# Patient Record
Sex: MALE | Race: WHITE | NOT HISPANIC OR LATINO | Employment: PART TIME | URBAN - METROPOLITAN AREA
[De-identification: names, ages, dates, MRNs, and addresses within clinical notes are randomized per-mention and may not be internally consistent; named-entity substitution may affect disease eponyms.]

---

## 2017-01-10 ENCOUNTER — CLINICAL SUPPORT (OUTPATIENT)
Dept: FAMILY MEDICINE | Facility: CLINIC | Age: 67
End: 2017-01-10
Payer: COMMERCIAL

## 2017-01-10 DIAGNOSIS — E29.1 HYPOGONADISM MALE: Primary | ICD-10-CM

## 2017-01-10 PROCEDURE — 96372 THER/PROPH/DIAG INJ SC/IM: CPT | Mod: S$GLB,,, | Performed by: FAMILY MEDICINE

## 2017-01-10 RX ADMIN — TESTOSTERONE CYPIONATE 200 MG: 200 INJECTION, SOLUTION INTRAMUSCULAR at 09:01

## 2017-04-26 ENCOUNTER — PATIENT MESSAGE (OUTPATIENT)
Dept: FAMILY MEDICINE | Facility: CLINIC | Age: 67
End: 2017-04-26

## 2017-05-10 ENCOUNTER — OFFICE VISIT (OUTPATIENT)
Dept: FAMILY MEDICINE | Facility: CLINIC | Age: 67
End: 2017-05-10
Payer: COMMERCIAL

## 2017-05-10 VITALS
DIASTOLIC BLOOD PRESSURE: 70 MMHG | BODY MASS INDEX: 46.13 KG/M2 | HEIGHT: 68 IN | HEART RATE: 68 BPM | SYSTOLIC BLOOD PRESSURE: 128 MMHG | WEIGHT: 304.38 LBS | TEMPERATURE: 98 F

## 2017-05-10 DIAGNOSIS — I10 ESSENTIAL HYPERTENSION: ICD-10-CM

## 2017-05-10 DIAGNOSIS — M51.36 DDD (DEGENERATIVE DISC DISEASE), LUMBAR: Primary | ICD-10-CM

## 2017-05-10 DIAGNOSIS — E78.5 HYPERLIPIDEMIA, UNSPECIFIED HYPERLIPIDEMIA TYPE: ICD-10-CM

## 2017-05-10 DIAGNOSIS — M25.562 CHRONIC PAIN OF BOTH KNEES: ICD-10-CM

## 2017-05-10 DIAGNOSIS — M25.561 CHRONIC PAIN OF BOTH KNEES: ICD-10-CM

## 2017-05-10 DIAGNOSIS — G89.29 CHRONIC PAIN OF BOTH KNEES: ICD-10-CM

## 2017-05-10 PROCEDURE — 3074F SYST BP LT 130 MM HG: CPT | Mod: S$GLB,,, | Performed by: FAMILY MEDICINE

## 2017-05-10 PROCEDURE — 99213 OFFICE O/P EST LOW 20 MIN: CPT | Mod: S$GLB,,, | Performed by: FAMILY MEDICINE

## 2017-05-10 PROCEDURE — 99999 PR PBB SHADOW E&M-EST. PATIENT-LVL IV: CPT | Mod: PBBFAC,,, | Performed by: FAMILY MEDICINE

## 2017-05-10 PROCEDURE — 1160F RVW MEDS BY RX/DR IN RCRD: CPT | Mod: S$GLB,,, | Performed by: FAMILY MEDICINE

## 2017-05-10 PROCEDURE — 3078F DIAST BP <80 MM HG: CPT | Mod: S$GLB,,, | Performed by: FAMILY MEDICINE

## 2017-05-10 RX ORDER — MELOXICAM 15 MG/1
15 TABLET ORAL DAILY
Qty: 30 TABLET | Refills: 0 | Status: SHIPPED | OUTPATIENT
Start: 2017-05-10 | End: 2017-05-10 | Stop reason: SDUPTHER

## 2017-05-10 RX ORDER — ATORVASTATIN CALCIUM 40 MG/1
TABLET, FILM COATED ORAL
Qty: 90 TABLET | Refills: 3 | Status: SHIPPED | OUTPATIENT
Start: 2017-05-10 | End: 2017-07-31 | Stop reason: SDUPTHER

## 2017-05-10 RX ORDER — MELOXICAM 15 MG/1
15 TABLET ORAL DAILY
Qty: 30 TABLET | Refills: 0 | Status: SHIPPED | OUTPATIENT
Start: 2017-05-10 | End: 2017-10-30 | Stop reason: SDUPTHER

## 2017-05-10 RX ORDER — VALSARTAN 160 MG/1
TABLET ORAL
Qty: 90 TABLET | Refills: 3 | Status: SHIPPED | OUTPATIENT
Start: 2017-05-10 | End: 2017-07-31 | Stop reason: SDUPTHER

## 2017-05-10 NOTE — MR AVS SNAPSHOT
[facility]  [address] [facility] [address]  Phone: [phone]  Fax: [phone]                  Marvin Chacon Jr.   5/10/2017 1:20 PM   Office Visit    Description:  Male : 1950   Provider:  Stef Brady MD   Department:  [facility]           Reason for Visit     Knee Pain     Back Pain     Shortness of Breath           Diagnoses this Visit        Comments    DDD (degenerative disc disease), lumbar    -  Primary     Chronic pain of both knees         Essential hypertension         Hyperlipidemia, unspecified hyperlipidemia type                To Do List           Goals (5 Years of Data)     None       These Medications        Disp Refills Start End    meloxicam (MOBIC) 15 MG tablet 30 tablet 0 5/10/2017 5/10/2018    Take 1 tablet (15 mg total) by mouth once daily. - Oral    [facility] - [place], [place] - [address] AT [address] to [facility] Ph #: [phone]         [facility] On Call     [facility] On [facility] Nurse Care Line -  Assistance  Unless otherwise directed by your provider, please contact [facility], our nurse care line that is available for  assistance.     Registered nurses in the [facility] On Call Center provide: appointment scheduling, clinical advisement, health education, and other advisory services.  Call: [phone] (toll free)               Medications           Message regarding Medications     Verify the changes and/or additions to your medication regime listed below are the same as discussed with your clinician today.  If any of these changes or additions are incorrect, please notify your healthcare provider.        START taking these NEW medications        Refills    meloxicam (MOBIC) 15 MG tablet 0    Sig: Take 1 tablet (15 mg total) by mouth once daily.    Class: Normal    Route: Oral      STOP taking these medications     diclofenac (VOLTAREN) 75 MG EC tablet  "Take 1 tablet (75 mg total) by mouth 2 (two) times daily.           Verify that the below list of medications is an accurate representation of the medications you are currently taking.  If none reported, the list may be blank. If incorrect, please contact your healthcare provider. Carry this list with you in case of emergency.           Current Medications     ASCORBIC ACID (FRUIT C-500 ORAL) Take 1 tablet by mouth 2 (two) times daily.    aspirin 325 MG tablet Take 325 mg by mouth once daily.    atorvastatin (LIPITOR) 40 MG tablet TAKE 1 TABLET ONCE DAILY    clotrimazole (LOTRIMIN) 1 % cream Apply topically 2 (two) times daily. Use bid prn.    fluticasone (FLONASE) 50 mcg/actuation nasal spray 2 sprays by Each Nare route once daily.    multivitamin-minerals-lutein (CENTRUM SILVER) Tab Take 1 tablet by mouth once daily. Every day    omega-3 acid ethyl esters (LOVAZA) 1 gram capsule Take 2 capsules (2 g total) by mouth 2 (two) times daily.    PSYLLIUM SEED, WITH DEXTROSE, (NATURAL VEGETABLE ORAL) Take 1 tablet by mouth 2 (two) times daily.    sodium chloride (OCEAN NASAL MIST) 0.65 % nasal spray Four times a day    valsartan (DIOVAN) 160 MG tablet TAKE 1 TABLET ONCE DAILY    meloxicam (MOBIC) 15 MG tablet Take 1 tablet (15 mg total) by mouth once daily.           Clinical Reference Information           Your Vitals Were     BP Pulse Temp Height Weight BMI    128/70 (BP Location: Left arm, Patient Position: Sitting, BP Method: Automatic) 68 98.3 °F (36.8 °C) (Oral) 5' 8" (1.727 m) 138 kg (304 lb 5.5 oz) 46.28 kg/m2      Blood Pressure          Most Recent Value    BP  128/70      Allergies as of 5/10/2017     No Known Drug Allergies      Immunizations Administered on Date of Encounter - 5/10/2017     None      Orders Placed During Today's Visit      Normal Orders This Visit    Ambulatory Referral to Physical/Occupational Therapy     Future Labs/Procedures Expected by Expires    Comprehensive metabolic panel  5/10/2017 " (Approximate) 5/10/2018    Lipid panel  5/10/2017 (Approximate) 5/10/2018      Instructions               Language Assistance Services     ATTENTION: Language assistance services are available, free of charge. Please call [phone].      ATENCIÓN: Si rose mckinley, tiene a guillen disposición servicios gratuitos de asistencia lingüística. Llame al [phone].     CHÚ Ý: N?u b?n nói Ti?ng Vi?t, có các d?ch v? h? tr? ngôn ng? mi?n phí dành cho b?n. G?i s? [phone].         [facility] complies with applicable Federal civil rights laws and does not discriminate on the basis of race, color, national origin, age, disability, or sex.

## 2017-05-10 NOTE — PROGRESS NOTES
Subjective:      Patient ID: Marvin Chacon Jr. is a 66 y.o. male.    Chief Complaint: Knee Pain; Back Pain; and Shortness of Breath    HPI  He states that when he gets up in the AM, he has a little dizziness and he feels that he sits up for 10-30 seconds and he can get up and move.     His main problem is that eh has knee pain that is bilaterally and the left knee is most painful on the lateral side just lateral to the knee cap.   His right L5 has been having a problem for 15 years. He states that he went to a back doc and he had an MRI. He had PT and he did beter for this for a long time.  If he leans to the left it is bad but if he leans over to the right to tie his shoe, he almost can't get up  He has radiation to the right leg when he does this. Sitting on the toilet will give him numbness in the right leg.  No bladder or bowel incontinence noted.  He is going onto Medicare on June 30.   He has radiating pain down the right leg.     Narrative      Routine multiplanar imaging through this 62-year-old males lumbar spine was obtained with the addition of a stir weighted sequence.    The vertebral bodies appear relatively well aligned.  Vertebral body heights appear well preserved.  Mild decreased T2 signal is noted at the L3-L4 L4-L5 and L5-S1 disks consistent with degenerative disk desiccation.  The spinal cord appears to terminate   at the L1 level.  No stir weighted signal abnormality was noted to suggest the presence of an aggressive the marrow placement process or recent fracture.      Osseous spurring is noted at each SI joint with partial ankylosis suspected on the right greater than left.  This is incompletely visualized but may relate to the history of degenerative change or sacroiliitis.  Please clinically correlate.    Anterior osseous spurring is noted at multiple levels including the T11-T12 T12 L1, L1 L2, L3 L4, L4-L5 and L5-S1 levels.    At the T12-L1 level no significant disk bulge, central canal  stenosis, or nerve foraminal stenosis is noted.    At the L1-L2 level, mild facet arthropathy and ligamentous hypertrophy is noted.  Minimal disk bulge of approximately 1 mm is noted with no significant central canal stenosis or neural foraminal stenosis  noted.    At the L2-L3 level, facet arthropathy and ligamentous hypertrophy is noted.  Minimal broad-based bulging is noted asymmetrically towards each nerve foramen of approximately 1 to 2 mm.  No significant central canal stenosis is noted.  Minimal   neuroforamina narrowing is noted with the exiting nerve roots appearing to exit unimpinged.    At the L3-L4 level, mild broad-based bulging is noted towards the left neural foramen with an asymmetric protrusive component towards the right nerve foramen and far lateral right that is broad based of approximately 4 mm.  Mild left neuroforaminal   stenosis is noted with moderate right nerve foraminal stenosis.  There is suspected to be contact of the exiting nerve root on the right.  No significant central canal stenosis is noted.    At the L4-L5 level facet arthropathy and ligamentous hypertrophy are noted.  No significant central canal stenosis is noted.  Asymmetric broad-based bulging is noted far lateral to the right and towards the right nerve foramen with moderate to severe   right-sided neural foraminal stenosis and suspected contact of the exiting nerve root.  Mild left-sided neural foraminal stenosis is noted with the exiting nerve root favored to exit unimpinged.  Facet arthropathy and ligamentous hypertrophy are seen.    At the L5-S1 level, a broad based central disk bulge appears to be present with a asymmetric protrusive component towards the paracentric left of approximately 4 to 5 mm.  Mild bilateral neuroforaminal stenosis noted.  Mild central canal narrowing is   noted.  Left lateral recess stenosis is noted with a nerve root coming near the disk material but without definitive contact.       Impression  "         1.  The protrusive changes are noted at the L3-L4 L4-L5 and L5-S1 levels as described above most notable for neuroforaminal stenosis at the L3-L4 and L4-L5 levels on the right.  The protrusion at the L5-S1 level is paracentric to the left and causing   left lateral recess stenosis.    2.  SI joint spurring and ankylosis appears to be present.  This may be degenerative relate to the history of sacroiliitis.        Electronically signed by: Marcos Clements MD  Date: 12/28/12  Time: 11:22         Encounter      View Encounter            He has started walking and he states that this has been OK for him.      The patient presents with essential hypertension.  The patient is tolerating the medication well and is in excellent compliance.  The patient is experiencing no side effects.  Counseling was offered regarding low salt diets.  The patient has a reduced salt intake.  The patient denies chest pain, palpitations, shortness of breath, dyspnea on exertion, left or murmur neck pain, nausea, vomiting, diaphoresis, paroxysmal nocturnal dyspnea, and orthopnea.   /70 (BP Location: Left arm, Patient Position: Sitting, BP Method: Automatic)  Pulse 68  Temp 98.3 °F (36.8 °C) (Oral)   Ht 5' 8" (1.727 m)  Wt (!) 138 kg (304 lb 5.5 oz)  BMI 46.28 kg/m2  The patient presents with hyperlipidemia.  The patient reports tolerating the medication well and is in excellent compliance.  There have been no medication side effects.  The patient denies chest pain, neuropathy, and myalgias.  The patient has reduced fat intake and has been exercising.  Current treatment has included the medications listed in the med card.    Lab Results   Component Value Date    CHOL 109 (L) 12/06/2016    CHOL 122 02/23/2015    CHOL 91 (L) 05/26/2014       Lab Results   Component Value Date    HDL 35 (L) 12/06/2016    HDL 40 02/23/2015    HDL 38 (L) 05/26/2014       Lab Results   Component Value Date    LDLCALC 53.0 (L) 12/06/2016    LDLCALC " "57.6 (L) 02/23/2015    LDLCALC 35.2 (L) 05/26/2014       Lab Results   Component Value Date    TRIG 105 12/06/2016    TRIG 122 02/23/2015    TRIG 89 05/26/2014       Lab Results   Component Value Date    CHOLHDL 32.1 12/06/2016    CHOLHDL 32.8 02/23/2015    CHOLHDL 41.8 05/26/2014     Lab Results   Component Value Date    ALT 22 12/06/2016    AST 20 12/06/2016    ALKPHOS 79 12/06/2016    BILITOT 0.5 12/06/2016       Review of Systems    Objective:   /70 (BP Location: Left arm, Patient Position: Sitting, BP Method: Automatic)  Pulse 68  Temp 98.3 °F (36.8 °C) (Oral)   Ht 5' 8" (1.727 m)  Wt (!) 138 kg (304 lb 5.5 oz)  BMI 46.28 kg/m2    Physical Exam   Musculoskeletal:        Right knee: He exhibits normal range of motion, no swelling, no effusion, no ecchymosis, no deformity, no laceration and no erythema. Tenderness found.        Left knee: He exhibits normal range of motion, no swelling, no effusion, no ecchymosis, no deformity, no laceration and no erythema. Tenderness found.        Lumbar back: He exhibits tenderness, pain and spasm. He exhibits normal range of motion, no swelling, no edema and no deformity.   Neurological: He is alert. He has normal strength. He displays no atrophy and no tremor. No sensory deficit. He exhibits normal muscle tone. Coordination and gait normal.   The patient has a negative straight leg raise bilaterally.         Assessment:     1. DDD (degenerative disc disease), lumbar    2. Chronic pain of both knees    3. Essential hypertension    4. Hyperlipidemia, unspecified hyperlipidemia type        Plan:   Marvin was seen today for knee pain, back pain and shortness of breath.    Diagnoses and all orders for this visit:    DDD (degenerative disc disease), lumbar  -     meloxicam (MOBIC) 15 MG tablet; Take 1 tablet (15 mg total) by mouth once daily.  -     Ambulatory Referral to Physical/Occupational Therapy    Chronic pain of both knees  -     meloxicam (MOBIC) 15 MG tablet; " Take 1 tablet (15 mg total) by mouth once daily.  -     Ambulatory Referral to Physical/Occupational Therapy    Essential hypertension  -     Comprehensive metabolic panel; Future    Hyperlipidemia, unspecified hyperlipidemia type  -     Comprehensive metabolic panel; Future  -     Lipid panel; Future      rtc if not better with the PT and consider an MRI in the future.

## 2017-06-11 ENCOUNTER — PATIENT MESSAGE (OUTPATIENT)
Dept: FAMILY MEDICINE | Facility: CLINIC | Age: 67
End: 2017-06-11

## 2017-06-11 DIAGNOSIS — G89.29 CHRONIC PAIN OF BOTH KNEES: ICD-10-CM

## 2017-06-11 DIAGNOSIS — M51.36 DDD (DEGENERATIVE DISC DISEASE), LUMBAR: ICD-10-CM

## 2017-06-11 DIAGNOSIS — M25.562 CHRONIC PAIN OF BOTH KNEES: ICD-10-CM

## 2017-06-11 DIAGNOSIS — M25.561 CHRONIC PAIN OF BOTH KNEES: ICD-10-CM

## 2017-07-29 ENCOUNTER — PATIENT MESSAGE (OUTPATIENT)
Dept: FAMILY MEDICINE | Facility: CLINIC | Age: 67
End: 2017-07-29

## 2017-07-29 DIAGNOSIS — I10 ESSENTIAL HYPERTENSION: ICD-10-CM

## 2017-07-31 DIAGNOSIS — I10 ESSENTIAL HYPERTENSION: ICD-10-CM

## 2017-07-31 RX ORDER — VALSARTAN 160 MG/1
160 TABLET ORAL DAILY
Qty: 90 TABLET | Refills: 3 | Status: SHIPPED | OUTPATIENT
Start: 2017-07-31 | End: 2017-08-02 | Stop reason: SDUPTHER

## 2017-07-31 RX ORDER — OMEGA-3-ACID ETHYL ESTERS 1 G/1
2 CAPSULE, LIQUID FILLED ORAL 2 TIMES DAILY
Qty: 360 CAPSULE | Refills: 3 | Status: SHIPPED | OUTPATIENT
Start: 2017-07-31 | End: 2020-01-28

## 2017-07-31 RX ORDER — ATORVASTATIN CALCIUM 40 MG/1
40 TABLET, FILM COATED ORAL DAILY
Qty: 90 TABLET | Refills: 3 | Status: SHIPPED | OUTPATIENT
Start: 2017-07-31 | End: 2017-08-02 | Stop reason: SDUPTHER

## 2017-07-31 NOTE — TELEPHONE ENCOUNTER
----- Message from Flor Michaud sent at 7/31/2017  3:06 PM CDT -----  Pt at [phone]//States he is returning your call//please call again//thanks/evangelista

## 2017-08-02 ENCOUNTER — LAB VISIT (OUTPATIENT)
Dept: LAB | Facility: HOSPITAL | Age: 67
End: 2017-08-02
Attending: INTERNAL MEDICINE
Payer: MEDICARE

## 2017-08-02 DIAGNOSIS — E78.00 PURE HYPERCHOLESTEROLEMIA: ICD-10-CM

## 2017-08-02 DIAGNOSIS — I10 ESSENTIAL HYPERTENSION, MALIGNANT: Primary | ICD-10-CM

## 2017-08-02 LAB
ANION GAP SERPL CALC-SCNC: 11 MMOL/L
BASOPHILS # BLD AUTO: 0.03 K/UL
BASOPHILS NFR BLD: 0.3 %
BUN SERPL-MCNC: 10 MG/DL
CALCIUM SERPL-MCNC: 9.7 MG/DL
CHLORIDE SERPL-SCNC: 100 MMOL/L
CHOLEST/HDLC SERPL: 3 {RATIO}
CO2 SERPL-SCNC: 30 MMOL/L
CREAT SERPL-MCNC: 1 MG/DL
DIFFERENTIAL METHOD: ABNORMAL
EOSINOPHIL # BLD AUTO: 0.1 K/UL
EOSINOPHIL NFR BLD: 1.3 %
ERYTHROCYTE [DISTWIDTH] IN BLOOD BY AUTOMATED COUNT: 14.4 %
EST. GFR  (AFRICAN AMERICAN): >60 ML/MIN/1.73 M^2
EST. GFR  (NON AFRICAN AMERICAN): >60 ML/MIN/1.73 M^2
GLUCOSE SERPL-MCNC: 103 MG/DL
HCT VFR BLD AUTO: 47.6 %
HDL/CHOLESTEROL RATIO: 33.6 %
HDLC SERPL-MCNC: 128 MG/DL
HDLC SERPL-MCNC: 43 MG/DL
HGB BLD-MCNC: 15.6 G/DL
LDLC SERPL CALC-MCNC: 20 MG/DL
LYMPHOCYTES # BLD AUTO: 2.1 K/UL
LYMPHOCYTES NFR BLD: 18.3 %
MCH RBC QN AUTO: 29.2 PG
MCHC RBC AUTO-ENTMCNC: 32.8 G/DL
MCV RBC AUTO: 89 FL
MONOCYTES # BLD AUTO: 0.6 K/UL
MONOCYTES NFR BLD: 5.6 %
NEUTROPHILS # BLD AUTO: 8.3 K/UL
NEUTROPHILS NFR BLD: 74.1 %
NONHDLC SERPL-MCNC: 85 MG/DL
PLATELET # BLD AUTO: 233 K/UL
PMV BLD AUTO: 9.9 FL
POTASSIUM SERPL-SCNC: 4.4 MMOL/L
RBC # BLD AUTO: 5.35 M/UL
SODIUM SERPL-SCNC: 141 MMOL/L
TRIGL SERPL-MCNC: 325 MG/DL
WBC # BLD AUTO: 11.18 K/UL

## 2017-08-02 PROCEDURE — 36415 COLL VENOUS BLD VENIPUNCTURE: CPT | Mod: PO

## 2017-08-02 PROCEDURE — 80061 LIPID PANEL: CPT

## 2017-08-02 PROCEDURE — 80076 HEPATIC FUNCTION PANEL: CPT

## 2017-08-02 PROCEDURE — 85025 COMPLETE CBC W/AUTO DIFF WBC: CPT

## 2017-08-02 PROCEDURE — 80048 BASIC METABOLIC PNL TOTAL CA: CPT

## 2017-08-02 RX ORDER — ATORVASTATIN CALCIUM 40 MG/1
40 TABLET, FILM COATED ORAL DAILY
Qty: 90 TABLET | Refills: 3 | Status: SHIPPED | OUTPATIENT
Start: 2017-08-02 | End: 2017-08-04 | Stop reason: SDUPTHER

## 2017-08-02 RX ORDER — VALSARTAN 160 MG/1
160 TABLET ORAL DAILY
Qty: 90 TABLET | Refills: 3 | Status: SHIPPED | OUTPATIENT
Start: 2017-08-02 | End: 2017-08-04 | Stop reason: SDUPTHER

## 2017-08-03 LAB
ALBUMIN SERPL BCP-MCNC: 3.5 G/DL
ALP SERPL-CCNC: 81 U/L
ALT SERPL W/O P-5'-P-CCNC: 24 U/L
AST SERPL-CCNC: 21 U/L
BILIRUB DIRECT SERPL-MCNC: 0.1 MG/DL
BILIRUB SERPL-MCNC: 0.3 MG/DL
PROT SERPL-MCNC: 7.7 G/DL

## 2017-08-04 DIAGNOSIS — I10 ESSENTIAL HYPERTENSION: ICD-10-CM

## 2017-08-04 RX ORDER — VALSARTAN 160 MG/1
160 TABLET ORAL DAILY
Qty: 90 TABLET | Refills: 3 | Status: SHIPPED | OUTPATIENT
Start: 2017-08-04 | End: 2018-07-27 | Stop reason: ALTCHOICE

## 2017-08-04 RX ORDER — ATORVASTATIN CALCIUM 40 MG/1
40 TABLET, FILM COATED ORAL DAILY
Qty: 90 TABLET | Refills: 3 | Status: SHIPPED | OUTPATIENT
Start: 2017-08-04 | End: 2019-04-30 | Stop reason: SDUPTHER

## 2017-08-05 ENCOUNTER — PATIENT MESSAGE (OUTPATIENT)
Dept: FAMILY MEDICINE | Facility: CLINIC | Age: 67
End: 2017-08-05

## 2017-08-05 DIAGNOSIS — I10 ESSENTIAL HYPERTENSION: ICD-10-CM

## 2017-08-05 DIAGNOSIS — E78.5 HYPERLIPIDEMIA, UNSPECIFIED HYPERLIPIDEMIA TYPE: Primary | ICD-10-CM

## 2017-08-07 NOTE — TELEPHONE ENCOUNTER
I have signed for the following orders AND/OR meds.  Please call the patient and ask the patient to schedule the testing AND/OR inform about any medications that were sent.      Orders Placed This Encounter   Procedures    Comprehensive metabolic panel     Standing Status:   Future     Standing Expiration Date:   8/7/2018    Lipid panel     Standing Status:   Future     Standing Expiration Date:   8/7/2018    CBC auto differential     Standing Status:   Future     Standing Expiration Date:   10/6/2018

## 2017-08-09 ENCOUNTER — LAB VISIT (OUTPATIENT)
Dept: LAB | Facility: HOSPITAL | Age: 67
End: 2017-08-09
Attending: FAMILY MEDICINE
Payer: MEDICARE

## 2017-08-09 DIAGNOSIS — E78.5 HYPERLIPIDEMIA, UNSPECIFIED HYPERLIPIDEMIA TYPE: ICD-10-CM

## 2017-08-09 DIAGNOSIS — I10 ESSENTIAL HYPERTENSION: ICD-10-CM

## 2017-08-09 LAB
ALBUMIN SERPL BCP-MCNC: 3.6 G/DL
ALP SERPL-CCNC: 78 U/L
ALT SERPL W/O P-5'-P-CCNC: 22 U/L
ANION GAP SERPL CALC-SCNC: 11 MMOL/L
AST SERPL-CCNC: 21 U/L
BASOPHILS # BLD AUTO: 0.03 K/UL
BASOPHILS NFR BLD: 0.3 %
BILIRUB SERPL-MCNC: 0.3 MG/DL
BUN SERPL-MCNC: 10 MG/DL
CALCIUM SERPL-MCNC: 9.5 MG/DL
CHLORIDE SERPL-SCNC: 102 MMOL/L
CHOLEST/HDLC SERPL: 3 {RATIO}
CO2 SERPL-SCNC: 29 MMOL/L
CREAT SERPL-MCNC: 0.8 MG/DL
DIFFERENTIAL METHOD: ABNORMAL
EOSINOPHIL # BLD AUTO: 0.2 K/UL
EOSINOPHIL NFR BLD: 1.7 %
ERYTHROCYTE [DISTWIDTH] IN BLOOD BY AUTOMATED COUNT: 14.7 %
EST. GFR  (AFRICAN AMERICAN): >60 ML/MIN/1.73 M^2
EST. GFR  (NON AFRICAN AMERICAN): >60 ML/MIN/1.73 M^2
GLUCOSE SERPL-MCNC: 105 MG/DL
HCT VFR BLD AUTO: 46.6 %
HDL/CHOLESTEROL RATIO: 33.3 %
HDLC SERPL-MCNC: 126 MG/DL
HDLC SERPL-MCNC: 42 MG/DL
HGB BLD-MCNC: 15.3 G/DL
LDLC SERPL CALC-MCNC: 61.2 MG/DL
LYMPHOCYTES # BLD AUTO: 1.6 K/UL
LYMPHOCYTES NFR BLD: 18.3 %
MCH RBC QN AUTO: 28.4 PG
MCHC RBC AUTO-ENTMCNC: 32.8 G/DL
MCV RBC AUTO: 87 FL
MONOCYTES # BLD AUTO: 0.5 K/UL
MONOCYTES NFR BLD: 6.1 %
NEUTROPHILS # BLD AUTO: 6.5 K/UL
NEUTROPHILS NFR BLD: 73.3 %
NONHDLC SERPL-MCNC: 84 MG/DL
PLATELET # BLD AUTO: 199 K/UL
PMV BLD AUTO: 9.7 FL
POTASSIUM SERPL-SCNC: 4.3 MMOL/L
PROT SERPL-MCNC: 7.3 G/DL
RBC # BLD AUTO: 5.39 M/UL
SODIUM SERPL-SCNC: 142 MMOL/L
TRIGL SERPL-MCNC: 114 MG/DL
WBC # BLD AUTO: 8.92 K/UL

## 2017-08-09 PROCEDURE — 80053 COMPREHEN METABOLIC PANEL: CPT

## 2017-08-09 PROCEDURE — 80061 LIPID PANEL: CPT

## 2017-08-09 PROCEDURE — 85025 COMPLETE CBC W/AUTO DIFF WBC: CPT

## 2017-08-09 PROCEDURE — 36415 COLL VENOUS BLD VENIPUNCTURE: CPT | Mod: PO

## 2017-09-25 ENCOUNTER — PATIENT MESSAGE (OUTPATIENT)
Dept: FAMILY MEDICINE | Facility: CLINIC | Age: 67
End: 2017-09-25

## 2017-10-09 ENCOUNTER — PATIENT MESSAGE (OUTPATIENT)
Dept: FAMILY MEDICINE | Facility: CLINIC | Age: 67
End: 2017-10-09

## 2017-10-30 ENCOUNTER — OFFICE VISIT (OUTPATIENT)
Dept: FAMILY MEDICINE | Facility: CLINIC | Age: 67
End: 2017-10-30
Payer: MEDICARE

## 2017-10-30 VITALS
HEIGHT: 68 IN | HEART RATE: 60 BPM | TEMPERATURE: 98 F | DIASTOLIC BLOOD PRESSURE: 68 MMHG | SYSTOLIC BLOOD PRESSURE: 128 MMHG | WEIGHT: 299.63 LBS | BODY MASS INDEX: 45.41 KG/M2

## 2017-10-30 DIAGNOSIS — G47.30 SLEEP APNEA, UNSPECIFIED TYPE: Primary | ICD-10-CM

## 2017-10-30 DIAGNOSIS — M76.52 PATELLAR TENDINITIS OF LEFT KNEE: ICD-10-CM

## 2017-10-30 PROCEDURE — 99213 OFFICE O/P EST LOW 20 MIN: CPT | Mod: PBBFAC,PO | Performed by: FAMILY MEDICINE

## 2017-10-30 PROCEDURE — 99214 OFFICE O/P EST MOD 30 MIN: CPT | Mod: S$PBB,,, | Performed by: FAMILY MEDICINE

## 2017-10-30 PROCEDURE — 99999 PR PBB SHADOW E&M-EST. PATIENT-LVL III: CPT | Mod: PBBFAC,,, | Performed by: FAMILY MEDICINE

## 2017-10-30 RX ORDER — MELOXICAM 15 MG/1
15 TABLET ORAL DAILY
Qty: 30 TABLET | Refills: 1 | Status: SHIPPED | OUTPATIENT
Start: 2017-10-30 | End: 2018-03-07

## 2017-10-30 NOTE — PROGRESS NOTES
Subjective:      Patient ID: Marvin Chacon Jr. is a 67 y.o. male.    Chief Complaint: Follow-up    HPI   He has sleep apnea and he has been treated for this since 1993 and he has been on a CPAP since then.  He has had a surgery and still required the cpap.  He has not had weight loss.  He has been on this current setting for the last 23 years.  He has not had another cpap titration study that he knows of. 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    Knee Pain: Patient presents with knee pain involving the  left knee. Onset of the symptoms was several months ago. Inciting event: none known. Current symptoms include pain located AT THE INFERIOR LEFT KNEE. . Pain is aggravated by squatting and walking.  Patient has had prior knee problems.      Health Maintenance Due   Topic Date Due    Zoster Vaccine  07/31/2012    Pneumococcal (65+) (1 of 2 - PCV13) 07/31/2017    Abdominal Aortic Aneurysm Screening  07/31/2017    Influenza Vaccine  08/01/2017       Past Medical History:  Past Medical History:   Diagnosis Date    Cerebrovascular disease 3/15/2013    LUGO (dyspnea on exertion)     History of colon polyps     Adenomatous polyp of colon (D12.6)    History of nephrolithiasis     History of seasonal allergies     Hyperlipidemia LDL goal < 70     Hypertension     Sleep apnea     compliant with CPAP     Past Surgical History:   Procedure Laterality Date    CYST REMOVAL      HERNIA REPAIR      umbilical hernia repair    MD COLONOSCOPY,BIOPSY  4/17/2012    repeat colonoscopy 2015    SPINE BIOPSY      Cyst Removed    TONSILLECTOMY      UVULOPALATOPHARYNGOPLASTY       Review of patient's allergies indicates:   Allergen Reactions    No known drug allergies      Current Outpatient Prescriptions on File Prior to Visit   Medication Sig Dispense Refill    ASCORBIC ACID (FRUIT C-500 ORAL) Take 1 tablet by mouth 2 (two) times daily.      aspirin 325 MG tablet Take 325 mg by mouth  once daily.      atorvastatin (LIPITOR) 40 MG tablet Take 1 tablet (40 mg total) by mouth once daily. 90 tablet 3    clotrimazole (LOTRIMIN) 1 % cream Apply topically 2 (two) times daily. Use bid prn. 45 g 5    fluticasone (FLONASE) 50 mcg/actuation nasal spray 2 sprays by Each Nare route once daily. (Patient taking differently: 2 sprays by Each Nare route daily as needed. ) 16 g 0    multivitamin-minerals-lutein (CENTRUM SILVER) Tab Take 1 tablet by mouth once daily. Every day      omega-3 acid ethyl esters (LOVAZA) 1 gram capsule Take 2 capsules (2 g total) by mouth 2 (two) times daily. 360 capsule 3    PSYLLIUM SEED, WITH DEXTROSE, (NATURAL VEGETABLE ORAL) Take 1 tablet by mouth 2 (two) times daily.      sodium chloride (OCEAN NASAL MIST) 0.65 % nasal spray Four times a day      valsartan (DIOVAN) 160 MG tablet Take 1 tablet (160 mg total) by mouth once daily. 90 tablet 3     No current facility-administered medications on file prior to visit.      Social History     Social History    Marital status:      Spouse name: Monika    Number of children: 2    Years of education: N/A     Occupational History    retired  [employer]     Social History Main Topics    Smoking status: Former Smoker     Quit date: 5/23/1982    Smokeless tobacco: Former User     Quit date: 5/23/1982    Alcohol use No    Drug use: No    Sexual activity: Yes     Partners: Female     Other Topics Concern    Not on file     Social History Narrative    No narrative on file     Family History   Problem Relation Age of Onset    Heart disease Mother     Cancer Mother      cancer    Cancer Father      Lung    Hypertension Father     Stroke Father     Cancer Maternal Uncle      Bone    Cancer Maternal Uncle      Throat    Stroke Maternal Uncle     Thyroid disease Sister            Review of Systems   Constitutional: Negative for activity change and unexpected weight change.   HENT: Negative for hearing loss,  "rhinorrhea and trouble swallowing.    Eyes: Negative for discharge and visual disturbance.   Respiratory: Negative for chest tightness and wheezing.    Cardiovascular: Negative for chest pain and palpitations.   Gastrointestinal: Negative for blood in stool, constipation, diarrhea and vomiting.   Endocrine: Negative for polydipsia and polyuria.   Genitourinary: Negative for difficulty urinating, hematuria and urgency.   Musculoskeletal: Positive for arthralgias. Negative for joint swelling and neck pain.   Neurological: Negative for weakness and headaches.   Psychiatric/Behavioral: Negative for confusion and dysphoric mood.       Objective:   /68   Pulse 60   Temp 98.1 °F (36.7 °C) (Oral)   Ht 5' 8" (1.727 m)   Wt 135.9 kg (299 lb 9.7 oz)   BMI 45.55 kg/m²     Physical Exam   Constitutional: He is oriented to person, place, and time. He appears well-developed and well-nourished.   HENT:   Head: Normocephalic and atraumatic.   Right Ear: External ear normal.   Left Ear: External ear normal.   Nose: Nose normal.   Mouth/Throat: Oropharynx is clear and moist. No oropharyngeal exudate.   Eyes: Conjunctivae and EOM are normal. Pupils are equal, round, and reactive to light. Right eye exhibits no discharge. Left eye exhibits no discharge. No scleral icterus.   Neck: Normal range of motion. Neck supple. No JVD present. No thyromegaly present.   Cardiovascular: Normal rate, regular rhythm, normal heart sounds and intact distal pulses.  Exam reveals no gallop and no friction rub.    No murmur heard.  Pulmonary/Chest: Effort normal and breath sounds normal. No respiratory distress. He has no wheezes. He has no rales. He exhibits no tenderness.   Abdominal: Soft. Bowel sounds are normal. He exhibits no distension and no mass. There is no tenderness. There is no rebound and no guarding.   Musculoskeletal: Normal range of motion. He exhibits no edema.        Left knee: He exhibits normal range of motion, no swelling, " no effusion, no ecchymosis, no deformity, no erythema and no LCL laxity. Tenderness found. Patellar tendon tenderness noted.   Lymphadenopathy:     He has no cervical adenopathy.   Neurological: He is alert and oriented to person, place, and time. No cranial nerve deficit. Coordination normal.   Skin: Skin is warm and dry. He is not diaphoretic.   Psychiatric: He has a normal mood and affect.       Assessment:     1. Sleep apnea, unspecified type    2. Patellar tendinitis of left knee        Plan:   Marvin was seen today for follow-up.    Diagnoses and all orders for this visit:    Sleep apnea, unspecified type  -     CPAP titration (Must have diagnosis of ALAN from previous sleep study.); Future    Patellar tendinitis of left knee  -     meloxicam (MOBIC) 15 MG tablet; Take 1 tablet (15 mg total) by mouth once daily.  -     Ambulatory Referral to Physical/Occupational Therapy

## 2017-11-19 ENCOUNTER — TELEPHONE (OUTPATIENT)
Dept: FAMILY MEDICINE | Facility: CLINIC | Age: 67
End: 2017-11-19

## 2017-11-20 NOTE — TELEPHONE ENCOUNTER
----- Message from Bill Lynne sent at 11/17/2017  8:29 AM CST -----  Hello, This patient is scheduled for a cpap titration, He has medicare. He will need a modified study/split night.Due to how long its been since last study for medicare to pay.Can order be changed to a PSG study? (SLE-3)    thanks

## 2017-11-21 ENCOUNTER — HOSPITAL ENCOUNTER (OUTPATIENT)
Dept: SLEEP MEDICINE | Facility: HOSPITAL | Age: 67
Discharge: HOME | End: 2017-11-21
Attending: FAMILY MEDICINE
Payer: MEDICARE

## 2017-11-21 DIAGNOSIS — G47.61 PERIODIC LIMB MOVEMENT DISORDER (PLMD): ICD-10-CM

## 2017-11-21 DIAGNOSIS — F51.12 BEHAVIORALLY INDUCED INSUFFICIENT SLEEP SYNDROME: ICD-10-CM

## 2017-11-21 DIAGNOSIS — G47.33 OBSTRUCTIVE SLEEP APNEA: Primary | ICD-10-CM

## 2017-11-21 PROCEDURE — 95811 POLYSOM 6/>YRS CPAP 4/> PARM: CPT

## 2017-11-21 PROCEDURE — 95811 POLYSOM 6/>YRS CPAP 4/> PARM: CPT | Mod: 26,,, | Performed by: PSYCHOLOGIST

## 2017-12-05 ENCOUNTER — OFFICE VISIT (OUTPATIENT)
Dept: SLEEP MEDICINE | Facility: CLINIC | Age: 67
End: 2017-12-05
Payer: MEDICARE

## 2017-12-05 VITALS
OXYGEN SATURATION: 93 % | RESPIRATION RATE: 18 BRPM | HEIGHT: 68 IN | BODY MASS INDEX: 46.51 KG/M2 | DIASTOLIC BLOOD PRESSURE: 64 MMHG | HEART RATE: 71 BPM | SYSTOLIC BLOOD PRESSURE: 145 MMHG | WEIGHT: 306.88 LBS

## 2017-12-05 DIAGNOSIS — G47.33 OSA (OBSTRUCTIVE SLEEP APNEA): Primary | ICD-10-CM

## 2017-12-05 DIAGNOSIS — E66.01 MORBID OBESITY WITH BMI OF 45.0-49.9, ADULT: ICD-10-CM

## 2017-12-05 PROCEDURE — 99204 OFFICE O/P NEW MOD 45 MIN: CPT | Mod: S$PBB,,, | Performed by: NURSE PRACTITIONER

## 2017-12-05 PROCEDURE — 99214 OFFICE O/P EST MOD 30 MIN: CPT | Mod: PBBFAC,PO | Performed by: NURSE PRACTITIONER

## 2017-12-05 PROCEDURE — 99999 PR PBB SHADOW E&M-EST. PATIENT-LVL IV: CPT | Mod: PBBFAC,,, | Performed by: NURSE PRACTITIONER

## 2017-12-05 NOTE — PROGRESS NOTES
"Subjective:      Patient ID: Marvin Chacon Jr. is a 67 y.o. male.    Chief Complaint: Sleep Apnea        Patient presents to the office today as a new patient.  Patient is on BiPAP which he states is approximately 2 years old.  He recently changed to Medicare which required her sleep study.  He states he wears his BiPAP nightly, but he also has an old machine that he switches out at times.  He does well with his BiPAP and feels as though pressures are adequate.  I have requested download from [employer] to review.  Patient Active Problem List:     Phlebitis and thrombophlebitis of unspecified site     History of adenomatous polyp of colon     Hypertension     CVA (cerebral infarction)     Hyperlipidemia     Cerebrovascular disease     Hypogonadism male     Erectile disorder due to medical condition in male patient     History of colon polyps     Colon polyp     Family history of colon cancer     Male hypogonadism     Obstructive sleep apnea     Cubital tunnel syndrome     Bilateral carpal tunnel syndrome     Carpal tunnel syndrome     Chronic edema     Rhinophyma     Behaviorally induced insufficient sleep syndrome     Periodic limb movement disorder (PLMD)            BP (!) 145/64   Pulse 71   Resp 18   Ht 5' 8" (1.727 m)   Wt (!) 139.2 kg (306 lb 14.1 oz)   SpO2 (!) 93%   BMI 46.66 kg/m²   Body mass index is 46.66 kg/m².    Review of Systems   Respiratory:        See HPI   All other systems reviewed and are negative.    Objective:      Physical Exam   Constitutional: He is oriented to person, place, and time. He appears well-developed and well-nourished.   HENT:   Head: Normocephalic and atraumatic.   Mouth/Throat: Oropharynx is clear and moist.   Mallampati Score: IV   Eyes: EOM are normal. Pupils are equal, round, and reactive to light.   Neck: Normal range of motion. Neck supple.   Neck circumference:21.5 inches      Cardiovascular: Normal rate and regular rhythm.  Exam reveals no gallop and no friction rub. "    No murmur heard.  Pulmonary/Chest: Effort normal and breath sounds normal.   Abdominal: Soft. Bowel sounds are normal. He exhibits no distension. There is no tenderness.   Musculoskeletal: Normal range of motion.   Neurological: He is alert and oriented to person, place, and time.   Skin: Skin is warm and dry.   Psychiatric: He has a normal mood and affect.     Personal Diagnostic Review  The diagnostic polysomnography revealed a severe obstructive sleep apnea / hypopnea syndrome (A + H Index = 116.3  events / hr asleep with 79.6 respiratory event - arousals / hr asleep for the study, and an additional 2.3 RERAs / hr asleep  (respiratory effort - related arousals). The mean SpO2 value was 89.7 %, significant, minimum oxygen saturation during sleep  was 78.0 %, and waking baseline SpO2 was 94 %. Sporadic, mild snoring was noted.  2. BiPAP was initiated at 12:47 am. The BiPAP titration polysomnography revealed that BiPAP (Bi - Flex 3, humidity 2) of 21  cm IPAP / 17 cm EPAP, the most effective pressure most completely tested, was sufficient, as it reduced the rate of  respiratory events 94 % to A + H Index = 6.5 events / hr asleep in 0.9 hrs sleep (0.1 hrs REM sleep). Higher pressure also  could be used if necessary (22 cm / 18 cm A + H I = 12.6, with 0.1 hrs REM sleep in 0.9 hrs sleep). Most events at 21 cm /  17 cm and 22 cm / 18 cm were in REM sleep, and many were associated with supine sleep and oral breathing.       Assessment:       1. ALAN (obstructive sleep apnea)    2. Morbid obesity with BMI of 45.0-49.9, adult        Outpatient Encounter Prescriptions as of 12/5/2017   Medication Sig Dispense Refill    ASCORBIC ACID (FRUIT C-500 ORAL) Take 1 tablet by mouth 2 (two) times daily.      aspirin 325 MG tablet Take 325 mg by mouth once daily.      atorvastatin (LIPITOR) 40 MG tablet Take 1 tablet (40 mg total) by mouth once daily. 90 tablet 3    clotrimazole (LOTRIMIN) 1 % cream Apply topically 2 (two)  times daily. Use bid prn. 45 g 5    fluticasone (FLONASE) 50 mcg/actuation nasal spray 2 sprays by Each Nare route once daily. (Patient taking differently: 2 sprays by Each Nare route daily as needed. ) 16 g 0    meloxicam (MOBIC) 15 MG tablet Take 1 tablet (15 mg total) by mouth once daily. 30 tablet 1    multivitamin-minerals-lutein (CENTRUM SILVER) Tab Take 1 tablet by mouth once daily. Every day      omega-3 acid ethyl esters (LOVAZA) 1 gram capsule Take 2 capsules (2 g total) by mouth 2 (two) times daily. 360 capsule 3    PSYLLIUM SEED, WITH DEXTROSE, (NATURAL VEGETABLE ORAL) Take 1 tablet by mouth 2 (two) times daily.      sodium chloride (OCEAN NASAL MIST) 0.65 % nasal spray Four times a day      valsartan (DIOVAN) 160 MG tablet Take 1 tablet (160 mg total) by mouth once daily. 90 tablet 3     No facility-administered encounter medications on file as of 12/5/2017.      Orders Placed This Encounter   Procedures    CPAP/BIPAP SUPPLIES     APRIA     Order Specific Question:   Type of mask:     Answer:   Nasal     Order Specific Question:   Headgear?     Answer:   Yes     Order Specific Question:   Tubing?     Answer:   Yes     Order Specific Question:   Humidifier chamber?     Answer:   Yes     Order Specific Question:   Chin strap?     Answer:   Yes     Order Specific Question:   Filters?     Answer:   Yes     Order Specific Question:   Length of need (1-99 months):     Answer:   99     Plan:      Weight loss and exercise to improve overall health.  Review download from his current BIPAP. May need to make changes.

## 2017-12-05 NOTE — LETTER
December 5, 2017      Stef Brady MD  [address] [address]           [facility]  [address] [address]  Phone: [phone]          Patient: Marvin Chacon Jr.   MR Number: [id number]   YOB: 1950   Date of Visit: 12/5/2017       Dear Dr. Stef Brady:    Thank you for referring Marvin Chacon to me for evaluation. Attached you will find relevant portions of my assessment and plan of care.    If you have questions, please do not hesitate to call me. I look forward to following Marvin Chacon along with you.    Sincerely,    Elizabeth Lejeune, AKHIL    Enclosure  CC:  No Recipients    If you would like to receive this communication electronically, please contact [email or web address] or [phone] to request more information on [employer] Link access.    For providers and/or their staff who would like to refer a patient to [facility], please contact us through our one-stop-shop provider referral line, [facility], at [phone].    If you feel you have received this communication in error or would no longer like to receive these types of communications, please e-mail [email or web address]

## 2017-12-20 ENCOUNTER — PATIENT MESSAGE (OUTPATIENT)
Dept: PULMONOLOGY | Facility: CLINIC | Age: 67
End: 2017-12-20

## 2018-01-09 ENCOUNTER — TELEPHONE (OUTPATIENT)
Dept: FAMILY MEDICINE | Facility: CLINIC | Age: 68
End: 2018-01-09

## 2018-01-09 NOTE — TELEPHONE ENCOUNTER
----- Message from Lizzy Casey sent at 1/9/2018  2:43 PM CST -----  Contact: [facility]  She's calling to verify if a recert note was received, if it was, please advise [phone]

## 2018-01-09 NOTE — TELEPHONE ENCOUNTER
Returned call to [facility]. We do not have a record of this information being faxed to our office. States she will refax the document.

## 2018-01-12 ENCOUNTER — TELEPHONE (OUTPATIENT)
Dept: PULMONOLOGY | Facility: CLINIC | Age: 68
End: 2018-01-12

## 2018-01-16 ENCOUNTER — TELEPHONE (OUTPATIENT)
Dept: FAMILY MEDICINE | Facility: CLINIC | Age: 68
End: 2018-01-16

## 2018-01-16 NOTE — TELEPHONE ENCOUNTER
----- Message from Mali Tavarez sent at 1/16/2018 11:06 AM CST -----  Contact: moon OCHOA T /// [facility]  Please call at [phone] concerning a fax that was sent to you on 1-09-18 for insurance purposes/

## 2018-01-24 ENCOUNTER — PATIENT MESSAGE (OUTPATIENT)
Dept: PULMONOLOGY | Facility: CLINIC | Age: 68
End: 2018-01-24

## 2018-01-24 ENCOUNTER — PATIENT MESSAGE (OUTPATIENT)
Dept: FAMILY MEDICINE | Facility: CLINIC | Age: 68
End: 2018-01-24

## 2018-01-26 ENCOUNTER — TELEPHONE (OUTPATIENT)
Dept: PULMONOLOGY | Facility: CLINIC | Age: 68
End: 2018-01-26

## 2018-01-26 NOTE — TELEPHONE ENCOUNTER
Called back brenda and I was told they could not find pt in system. ----- Message from Marleni Mcdermott sent at 1/26/2018 12:31 PM CST -----  Contact: [facility]   Would like to consult with nurse regarding a Rx follow up. Please call back at [phone].      Thanks,  Marleni Mcdermott

## 2018-02-26 ENCOUNTER — PATIENT MESSAGE (OUTPATIENT)
Dept: PULMONOLOGY | Facility: CLINIC | Age: 68
End: 2018-02-26

## 2018-02-26 ENCOUNTER — PATIENT MESSAGE (OUTPATIENT)
Dept: FAMILY MEDICINE | Facility: CLINIC | Age: 68
End: 2018-02-26

## 2018-02-27 ENCOUNTER — PATIENT MESSAGE (OUTPATIENT)
Dept: DERMATOLOGY | Facility: CLINIC | Age: 68
End: 2018-02-27

## 2018-02-27 NOTE — TELEPHONE ENCOUNTER
Book OV with NP and get her to give the immunization, an rx for the zoster at the pharmacy, and an abdominal aortic ultrasound for screening.  Schedule the ultrasound on the same day.      They can order the right xray of the knee when seen and examined.  Get him in on his return from [place].

## 2018-03-07 ENCOUNTER — HOSPITAL ENCOUNTER (OUTPATIENT)
Dept: RADIOLOGY | Facility: HOSPITAL | Age: 68
Discharge: HOME | End: 2018-03-07
Attending: NURSE PRACTITIONER
Payer: MEDICARE

## 2018-03-07 ENCOUNTER — OFFICE VISIT (OUTPATIENT)
Dept: FAMILY MEDICINE | Facility: CLINIC | Age: 68
End: 2018-03-07
Payer: MEDICARE

## 2018-03-07 VITALS
SYSTOLIC BLOOD PRESSURE: 139 MMHG | WEIGHT: 307 LBS | BODY MASS INDEX: 46.53 KG/M2 | HEIGHT: 68 IN | HEART RATE: 77 BPM | DIASTOLIC BLOOD PRESSURE: 78 MMHG

## 2018-03-07 DIAGNOSIS — M25.562 ARTHRALGIA OF BOTH KNEES: ICD-10-CM

## 2018-03-07 DIAGNOSIS — M25.561 ARTHRALGIA OF BOTH KNEES: ICD-10-CM

## 2018-03-07 DIAGNOSIS — Z13.6 SCREENING FOR ABDOMINAL AORTIC ANEURYSM: ICD-10-CM

## 2018-03-07 DIAGNOSIS — Z23 NEED FOR VACCINATION FOR PNEUMOCOCCUS: ICD-10-CM

## 2018-03-07 DIAGNOSIS — M25.562 ARTHRALGIA OF BOTH KNEES: Primary | ICD-10-CM

## 2018-03-07 DIAGNOSIS — M25.561 ARTHRALGIA OF BOTH KNEES: Primary | ICD-10-CM

## 2018-03-07 PROCEDURE — 99213 OFFICE O/P EST LOW 20 MIN: CPT | Mod: S$PBB,,, | Performed by: NURSE PRACTITIONER

## 2018-03-07 PROCEDURE — G0009 ADMIN PNEUMOCOCCAL VACCINE: HCPCS | Mod: PBBFAC,PO

## 2018-03-07 PROCEDURE — 99214 OFFICE O/P EST MOD 30 MIN: CPT | Mod: PBBFAC,25,PO | Performed by: NURSE PRACTITIONER

## 2018-03-07 PROCEDURE — 99999 PR PBB SHADOW E&M-EST. PATIENT-LVL IV: CPT | Mod: PBBFAC,,, | Performed by: NURSE PRACTITIONER

## 2018-03-07 PROCEDURE — 73564 X-RAY EXAM KNEE 4 OR MORE: CPT | Mod: 26,50,, | Performed by: RADIOLOGY

## 2018-03-07 PROCEDURE — 73564 X-RAY EXAM KNEE 4 OR MORE: CPT | Mod: TC,50,PO

## 2018-03-07 RX ORDER — MELOXICAM 15 MG/1
15 TABLET ORAL DAILY
Qty: 30 TABLET | Refills: 0 | Status: SHIPPED | OUTPATIENT
Start: 2018-03-07 | End: 2018-04-06

## 2018-03-07 NOTE — PROGRESS NOTES
Subjective:      Patient ID: Marvin Chacon Jr. is a 67 y.o. male.    Chief Complaint: No chief complaint on file.    HPI   Patient to clinic with bilateral knee pain.  He reports he has had pain in his knees for about 12 years, exacerbated over the last 6 months or so.  He reports in October he was treated with Mobic and physical therapy for patellar tendonitis of his left knee.  He voices he did not get much relief with this.  He reports he is now having pain in both knees, left greater than right.  He reports on 2/26/2018, he stepped off of a curb by mistake and hyperextended left knee and has increased pain since.  He reports a resting pain of 5/10 on left, 3-10 on right.  He reports a maximal pain of 7/10 on left, 4-5/10 on right when standing.  He voices his pain is aggravated by standing and squatting, and he has stiffness when he first stands.  He reports some improvement once he begins to walk.  He reports his physical therapist recommended he have X-rays of both knees.  He cannot identify any other exacerbating or mitigating factors.  Health Maintenance Due   Topic Date Due    Zoster Vaccine  07/31/2012    Pneumococcal (65+) (1 of 2 - PCV13) 07/31/2017    Abdominal Aortic Aneurysm Screening  07/31/2017    Influenza Vaccine  08/01/2017     Review of Systems   Constitutional: Negative for activity change, chills, fatigue, fever and unexpected weight change.   HENT: Negative for hearing loss, rhinorrhea and trouble swallowing.    Eyes: Negative for discharge and visual disturbance.   Respiratory: Negative for cough, chest tightness, shortness of breath and wheezing.    Cardiovascular: Negative for chest pain, palpitations and leg swelling.   Gastrointestinal: Negative for blood in stool, constipation, diarrhea and vomiting.   Endocrine: Negative for polydipsia and polyuria.   Genitourinary: Negative for difficulty urinating, hematuria and urgency.   Musculoskeletal: Positive for arthralgias. Negative for  "joint swelling and neck pain.   Skin: Negative for rash and wound.   Neurological: Negative for weakness, numbness and headaches.   Psychiatric/Behavioral: Negative for confusion and dysphoric mood. The patient is not nervous/anxious.        Objective:     /78   Pulse 77   Ht 5' 8" (1.727 m)   Wt (!) 139.3 kg (307 lb)   BMI 46.68 kg/m²     Physical Exam   Constitutional: He is oriented to person, place, and time. He appears well-developed and well-nourished.   HENT:   Head: Normocephalic.   Eyes: Pupils are equal, round, and reactive to light.   Cardiovascular: Normal rate, regular rhythm and normal heart sounds.    Pulmonary/Chest: Effort normal and breath sounds normal. No respiratory distress. He has no decreased breath sounds. He has no wheezes. He has no rhonchi. He has no rales.   Musculoskeletal:        Right knee: He exhibits normal range of motion, no swelling, no effusion, no ecchymosis, no deformity, no laceration, no erythema, normal alignment and no LCL laxity. Tenderness found. Patellar tendon tenderness noted.        Left knee: He exhibits normal range of motion, no swelling, no effusion, no ecchymosis, no deformity, no laceration, no erythema, normal alignment and no LCL laxity. Tenderness found. Lateral joint line and patellar tendon tenderness noted.   Lymphadenopathy:     He has no cervical adenopathy.   Neurological: He is alert and oriented to person, place, and time.   Skin: Skin is warm and dry. No rash noted.   Psychiatric: He has a normal mood and affect. His behavior is normal. Judgment and thought content normal.   Vitals reviewed.    Assessment:     1. Arthralgia of both knees    2. Screening for abdominal aortic aneurysm    3. Need for vaccination for pneumococcus        Plan:     Problem List Items Addressed This Visit     None      Visit Diagnoses     Arthralgia of both knees    -  Primary    Relevant Medications    meloxicam (MOBIC) 15 MG tablet    Other Relevant Orders    " Ambulatory referral to Orthopedics    X-ray Knee Ortho Bilateral with Flexion    Screening for abdominal aortic aneurysm        Relevant Orders    US Abdominal Aorta    Need for vaccination for pneumococcus        Relevant Orders    (In Office Administered) Pneumococcal Conjugate Vaccine (13 Valent) (IM) (Completed)      Discussed with patient using compression brace to left knee and will send him to ortho since he failed PT    Follow-up if symptoms worsen or fail to improve.

## 2018-03-07 NOTE — PATIENT INSTRUCTIONS
¿Qué es la artritis?  La artritis es sudha enfermedad que afecta las articulaciones (las partes donde los distintos huesos se encuentran y se mueven). Puede afectar cualquier articulación de guillen cuerpo. Existen muchos tipos de artritis, que incluyen la osteoartritis (artrosis) y la artritis reumatoide. Si hamilton síntomas son leves, los medicamentos pueden ser suficientes para reducir el dolor y la hinchazón. En shay de sudha artritis más grave, se podría tener que operar las articulaciones para mejorar guillen estado.     sudha rodilla solange       sudha articulación de rodilla con artritis      ¿Qué causa la artritis?  El cartílago es un material liso que protege los extremos de los huesos. Cuando usted tiene artritis, se rompe y ya no puede proteger hamilton huesos. Los huesos rozan unos contra otros, lo que causa dolor e inflamación. Con el paso del tiempo, se pueden desarrollar espolones (trozos pequeños de hueso áspero o resquebrajado) que limitan el rango de movimiento de la articulación.  Síntomas  Algunos de los síntomas más comunes de la artritis son:  · Dolor y rigidez en las articulaciones, que empeoran cuando se descansa o se usa sudha articulación saul mucho tiempo o se le exige mucho.  · Las articulaciones pierden guillen forma y movimiento normales.  · Las articulaciones duelen al tocarlas y están inflamadas. Pueden estar enrojecidas y sentirse calientes.  · Las articulaciones hacen un chirrido o chasquido cuando se mueven.   · Se siente cansado todo el tiempo.  Reduzca los síntomas  Seguir un estilo de suellen saludable puede ayudar a reducir los síntomas de la osteoartritis: baje de peso y danna actividad física. Los medicamentos pueden sae muy buenos resultados para tratar la artritis reumatoide.     Date Last Reviewed: 9/10/2015  © 1528-6716 [employer]. [address] [place] [address], [place] [address]. Todos los derechos reservados. Esta información no pretende sustituir la atención médica profesional. Sólo guillen médico  puede diagnosticar y tratar un problema de kimberly.

## 2018-03-10 ENCOUNTER — PATIENT MESSAGE (OUTPATIENT)
Dept: FAMILY MEDICINE | Facility: CLINIC | Age: 68
End: 2018-03-10

## 2018-03-20 ENCOUNTER — TELEPHONE (OUTPATIENT)
Dept: RADIOLOGY | Facility: HOSPITAL | Age: 68
End: 2018-03-20

## 2018-03-21 ENCOUNTER — HOSPITAL ENCOUNTER (OUTPATIENT)
Dept: RADIOLOGY | Facility: HOSPITAL | Age: 68
Discharge: HOME | End: 2018-03-21
Attending: NURSE PRACTITIONER
Payer: MEDICARE

## 2018-03-21 ENCOUNTER — OFFICE VISIT (OUTPATIENT)
Dept: ORTHOPEDICS | Facility: CLINIC | Age: 68
End: 2018-03-21
Payer: MEDICARE

## 2018-03-21 VITALS — BODY MASS INDEX: 46.53 KG/M2 | HEIGHT: 68 IN | WEIGHT: 307 LBS

## 2018-03-21 DIAGNOSIS — M94.261 CHONDROMALACIA OF KNEE, RIGHT: ICD-10-CM

## 2018-03-21 DIAGNOSIS — M17.12 PRIMARY OSTEOARTHRITIS OF LEFT KNEE: Primary | ICD-10-CM

## 2018-03-21 DIAGNOSIS — Z13.6 SCREENING FOR ABDOMINAL AORTIC ANEURYSM: ICD-10-CM

## 2018-03-21 PROCEDURE — 99999 PR PBB SHADOW E&M-EST. PATIENT-LVL II: CPT | Mod: PBBFAC,,, | Performed by: ORTHOPAEDIC SURGERY

## 2018-03-21 PROCEDURE — 99214 OFFICE O/P EST MOD 30 MIN: CPT | Mod: S$PBB,,, | Performed by: ORTHOPAEDIC SURGERY

## 2018-03-21 PROCEDURE — 76775 US EXAM ABDO BACK WALL LIM: CPT | Mod: TC,PO

## 2018-03-21 PROCEDURE — 76775 US EXAM ABDO BACK WALL LIM: CPT | Mod: 26,,, | Performed by: RADIOLOGY

## 2018-03-21 PROCEDURE — 99212 OFFICE O/P EST SF 10 MIN: CPT | Mod: PBBFAC,25,PO | Performed by: ORTHOPAEDIC SURGERY

## 2018-03-21 RX ORDER — DICLOFENAC SODIUM 10 MG/G
2 GEL TOPICAL DAILY
Qty: 1 TUBE | Refills: 1 | Status: SHIPPED | OUTPATIENT
Start: 2018-03-21 | End: 2018-07-09

## 2018-03-21 NOTE — LETTER
March 21, 2018      Wolfgang Solano NP  [address] [address]           [facility]  [address] [address]  Phone: [phone]          Patient: Marvin Chacon Jr.   MR Number: [id number]   YOB: 1950   Date of Visit: 3/21/2018       Dear Wolfgang Solano:    Thank you for referring Marvin Chacon to me for evaluation. Attached you will find relevant portions of my assessment and plan of care.    If you have questions, please do not hesitate to call me. I look forward to following Marvin Chacon along with you.    Sincerely,    Marcos Dinero MD    Enclosure  CC:  No Recipients    If you would like to receive this communication electronically, please contact [email or web address] or [phone] to request more information on [employer] Link access.    For providers and/or their staff who would like to refer a patient to [facility], please contact us through our one-stop-shop provider referral line, [facility], at [phone].    If you feel you have received this communication in error or would no longer like to receive these types of communications, please e-mail [email or web address]

## 2018-03-21 NOTE — PROGRESS NOTES
DATE: 3/21/2018  PATIENT: Marvin Chacon Jr.  REFERRING MD:  CHIEF COMPLAINT:   Chief Complaint   Patient presents with    Knee Pain     magy       HISTORY:  Marvin Chacon Jr. is a 67 y.o. male  who presents for initial evaluation of bilateral knee pain.  He notes left greater than right knee pain.  He notes symptoms have been progressing over the last few years.  He is previously worked climbing telephone poles and as a .  He thinks all the activity has aggravated his knees.  His been treated by Dr. Liudmila Yin.  He has not had any cortisone injections to his knee.  He does use BIOflex and is taking glucosamine/chondroitin sulfate.  Pain is reported at 5/10 today.      PAST MEDICAL/SURGICAL HISTORY:  Past Medical History:   Diagnosis Date    Cerebrovascular disease 3/15/2013    LUGO (dyspnea on exertion)     History of colon polyps     Adenomatous polyp of colon (D12.6)    History of nephrolithiasis     History of seasonal allergies     Hyperlipidemia LDL goal < 70     Hypertension     Sleep apnea     compliant with CPAP     Past Surgical History:   Procedure Laterality Date    CYST REMOVAL      HERNIA REPAIR      umbilical hernia repair    [place] COLONOSCOPY,BIOPSY  4/17/2012    repeat colonoscopy 2015    rhino fyma N/A     [facility]      SPINE BIOPSY      Cyst Removed    TONSILLECTOMY      UVULOPALATOPHARYNGOPLASTY         Current Medications:   Current Outpatient Prescriptions:     ASCORBIC ACID (FRUIT C-500 ORAL), Take 1 tablet by mouth 2 (two) times daily., Disp: , Rfl:     aspirin 325 MG tablet, Take 325 mg by mouth once daily., Disp: , Rfl:     atorvastatin (LIPITOR) 40 MG tablet, Take 1 tablet (40 mg total) by mouth once daily., Disp: 90 tablet, Rfl: 3    fluticasone (FLONASE) 50 mcg/actuation nasal spray, 2 sprays by Each Nare route once daily. (Patient taking differently: 2 sprays by Each Nare route daily as needed. ), Disp: 16 g, Rfl: 0    meloxicam  "(MOBIC) 15 MG tablet, Take 1 tablet (15 mg total) by mouth once daily. Take Daily for 5 days then as daily as needed, Disp: 30 tablet, Rfl: 0    multivitamin-minerals-lutein (CENTRUM SILVER) Tab, Take 1 tablet by mouth once daily. Every day, Disp: , Rfl:     omega-3 acid ethyl esters (LOVAZA) 1 gram capsule, Take 2 capsules (2 g total) by mouth 2 (two) times daily., Disp: 360 capsule, Rfl: 3    valsartan (DIOVAN) 160 MG tablet, Take 1 tablet (160 mg total) by mouth once daily., Disp: 90 tablet, Rfl: 3    Family History: family history was reviewed and is noncontributory  Social History:   Social History     Social History    Marital status:      Spouse name: Monika    Number of children: 2    Years of education: N/A     Occupational History    retired  [employer]     Social History Main Topics    Smoking status: Former Smoker     Quit date: 5/23/1982    Smokeless tobacco: Former User     Quit date: 5/23/1982    Alcohol use No    Drug use: No    Sexual activity: Yes     Partners: Female     Other Topics Concern    Not on file     Social History Narrative    No narrative on file       ROS:  Constitution: Negative for chills, fever, and sweats. Negative for unexplained weight loss.  HENT: Negative for headaches and blurry vision.   Cardiovascular: Negative for chest pain, irregular heartbeat, leg swelling and palpitations.   Respiratory: Negative for cough and shortness of breath.   Gastrointestinal: Negative for abdominal pain, heartburn, nausea and vomiting.   Genitourinary: Negative for bladder incontinence and dysuria.   Musculoskeletal: Negative for systemic arthritis, muscle weakness and myalgias.   Neurological: Negative for numbness.   Psychiatric/Behavioral: Negative for depression.  Endocrine: Negative for polyuria.   Hematologic/Lymphatic: Negative for bleeding disorders.   Skin: Negative for poor wound healing.        PHYSICAL EXAM:  Ht 5' 8" (1.727 m)   Wt (!) 139.3 kg (307 " lb)   BMI 46.68 kg/m²   Marvin Chacon Jr. is a well developed, well nourished male in no acute distress. Physical examination of the bilateral knee evaluated the following:    Gait and Alignment  Inspection for ecchymosis, swelling, atrophy, or deformity  Inspection for intra-articular and/or bursal effusions  Tenderness to palpation over the bony and soft tissue structures around the knee  Range of Motion and presence of extensor lag/contractures  Sensation and motor strength  Varus/valgus or anterior/posterior/rotatory instability  Flexion pinch and Yash's Tests  Patellar alignment/tracking/pain to palpation  Vascular exam to include skin temperature/color/capillary refill    Remarkable findings included:  Elevated BMI.  No effusion of either knee.  Normal alignment.  Mild tenderness on the medial aspect of the left knee.  No instability to either knee.  No crepitus with range of motion.  Sensation intact to both lower extremities.        IMAGING:   X-rays are personally reviewed of both knees.  No acute fractures or dislocations are seen.  Moderate osteoarthrosis with medial compartment narrowing of the left knee noted.  Minimal changes without narrowing of the right knee identified     ASSESSMENT:   Osteoarthrosis left greater than right knee    PLAN:  The nature of the diagnosis, using models and diagrams when appropriate, was explained to the patient in detail.Treatment option discussed included observation, Voltaren gel, cortisone injection.  I've also recommended weight loss program and modification of activity.. All questions answered and the patient wishes to observe his symptoms at the present time.  Should his symptoms worsen, contact the office for consideration of cortisone injection.  Follow-up as needed..      This note was dictated using voice recognition software. Please excuse any grammatical or typographical errors.  Answers for HPI/ROS submitted by the patient on 3/19/2018   Leg  pain  unexpected weight change: No  appetite change : No  sleep disturbance: No  IMMUNOCOMPROMISED: No  nervous/ anxious: No  dysphoric mood: No  rash: No  visual disturbance: No  eye redness: No  eye pain: No  ear pain: No  tinnitus: No  hearing loss: No  sinus pressure : No  nosebleeds: No  enviro allergies: No  food allergies: No  cough: No  shortness of breath: No  sweating: No  frequency: No  difficulty urinating: No  hematuria: No  chest pain: No  palpitations: No  nausea: No  vomiting: No  diarrhea: No  blood in stool: No  constipation: No  headaches: No  dizziness: No  numbness: No  seizures: No  joint swelling: No  myalgia: No  weakness: No  back pain: No  Pain Chronicity: new  History of trauma: No  Onset: more than 1 month ago  Frequency: daily  Progression since onset: unchanged  injury location: at home  pain- numeric: 5/10  pain location: left knee  pain quality: sharp  Radiating Pain: No  Aggravating factors: walking  fever: No  inability to bear weight: No  itching: No  joint locking: No  limited range of motion: No  stiffness: Yes  tingling: No  Treatments tried: other  physical therapy: ineffective  Improvement on treatment: no relief

## 2018-04-03 ENCOUNTER — PATIENT MESSAGE (OUTPATIENT)
Dept: SLEEP MEDICINE | Facility: CLINIC | Age: 68
End: 2018-04-03

## 2018-04-03 ENCOUNTER — INITIAL CONSULT (OUTPATIENT)
Dept: DERMATOLOGY | Facility: CLINIC | Age: 68
End: 2018-04-03
Payer: MEDICARE

## 2018-04-03 VITALS — BODY MASS INDEX: 46.53 KG/M2 | HEIGHT: 68 IN | WEIGHT: 307 LBS

## 2018-04-03 DIAGNOSIS — D48.5 NEOPLASM OF UNCERTAIN BEHAVIOR OF SKIN: ICD-10-CM

## 2018-04-03 DIAGNOSIS — D22.9 MULTIPLE BENIGN NEVI: ICD-10-CM

## 2018-04-03 DIAGNOSIS — Z87.2: Primary | ICD-10-CM

## 2018-04-03 DIAGNOSIS — L57.0 ACTINIC KERATOSES: ICD-10-CM

## 2018-04-03 DIAGNOSIS — L91.8 CUTANEOUS SKIN TAGS: ICD-10-CM

## 2018-04-03 DIAGNOSIS — L82.1 SEBORRHEIC KERATOSES: ICD-10-CM

## 2018-04-03 PROCEDURE — 17000 DESTRUCT PREMALG LESION: CPT | Mod: S$PBB,,, | Performed by: DERMATOLOGY

## 2018-04-03 PROCEDURE — 17003 DESTRUCT PREMALG LES 2-14: CPT | Mod: PBBFAC,PO | Performed by: DERMATOLOGY

## 2018-04-03 PROCEDURE — 11100 PR BIOPSY OF SKIN LESION: CPT | Mod: 59,S$PBB,, | Performed by: DERMATOLOGY

## 2018-04-03 PROCEDURE — 17000 DESTRUCT PREMALG LESION: CPT | Mod: PBBFAC,PO | Performed by: DERMATOLOGY

## 2018-04-03 PROCEDURE — 11100 PR BIOPSY OF SKIN LESION: CPT | Mod: 59,PBBFAC,PO | Performed by: DERMATOLOGY

## 2018-04-03 PROCEDURE — 99999 PR PBB SHADOW E&M-EST. PATIENT-LVL III: CPT | Mod: PBBFAC,,, | Performed by: DERMATOLOGY

## 2018-04-03 PROCEDURE — 99213 OFFICE O/P EST LOW 20 MIN: CPT | Mod: PBBFAC,PO,25 | Performed by: DERMATOLOGY

## 2018-04-03 PROCEDURE — 17003 DESTRUCT PREMALG LES 2-14: CPT | Mod: S$PBB,,, | Performed by: DERMATOLOGY

## 2018-04-03 PROCEDURE — 88305 TISSUE EXAM BY PATHOLOGIST: CPT | Performed by: PATHOLOGY

## 2018-04-03 PROCEDURE — 99203 OFFICE O/P NEW LOW 30 MIN: CPT | Mod: 25,S$PBB,, | Performed by: DERMATOLOGY

## 2018-04-03 NOTE — LETTER
April 3, 2018      Cat Drake MD  [address]  [place] [address]           [facility]  [address] [facility] [address] [place] [address]  Phone: [phone]  Fax: [phone]          Patient: Marvin Chacon Jr.   MR Number: [id number]   YOB: 1950   Date of Visit: 4/3/2018       Dear Dr. Cat Drake:    Thank you for referring Marvin Chacon to me for evaluation. Attached you will find relevant portions of my assessment and plan of care.    If you have questions, please do not hesitate to call me. I look forward to following Marvin Chacon along with you.    Sincerely,    Maryam Stiles MD    Enclosure  CC:  No Recipients    If you would like to receive this communication electronically, please contact [email or web address] or [phone] to request more information on [employer] Link access.    For providers and/or their staff who would like to refer a patient to [facility], please contact us through our one-stop-shop provider referral line, [facility], at [phone].    If you feel you have received this communication in error or would no longer like to receive these types of communications, please e-mail [email or web address]

## 2018-04-03 NOTE — PROGRESS NOTES
Subjective:       Patient ID:  Marvin Chacon Jr. is a 67 y.o. 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 Pleased with results. No current treatment. 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   Lesions on forearms for 20 years- rough- treated with blue light therapy only a few times because insurance no longer covered it    Dr. Mortensen removed a growth from nose 7 years ago  He has had benign moles removed in the past  No phx of skin cancer. No fhx of skin cancer  Worked outdoors for several years    rough areas on chest and scalp years , asx no tx     Review of Systems   Skin: Negative for rash, daily sunscreen use, activity-related sunscreen use and recent sunburn.        Objective:    Physical Exam   Constitutional: He appears well-developed and well-nourished. He is obese.  No distress.   Eyes: Lids are normal.  No conjunctival no injection.   Cardiovascular: There is no local extremity swelling and no dependent edema.     Neurological: He is alert and oriented to person, place, and time. He is not disoriented.   Psychiatric: He has a normal mood and affect.   Skin:   Areas Examined (abnormalities noted in diagram):   Scalp / Hair Palpated and Inspected  Head / Face Inspection Performed  Neck Inspection Performed  Chest / Axilla Inspection Performed  Abdomen Inspection Performed  Back Inspection Performed  RUE Inspected  LUE Inspection Performed  RLE Inspected  LLE Inspection Performed                   Diagram Legend     Erythematous scaling macule/papule c/w actinic keratosis       Vascular papule c/w angioma      Pigmented verrucoid papule/plaque c/w seborrheic keratosis      Yellow umbilicated papule c/w sebaceous hyperplasia      Irregularly shaped tan macule c/w lentigo     1-2 mm smooth white papules consistent with Milia      Movable subcutaneous cyst with  punctum c/w epidermal inclusion cyst      Subcutaneous movable cyst c/w pilar cyst      Firm pink to brown papule c/w dermatofibroma      Pedunculated fleshy papule(s) c/w skin tag(s)      Evenly pigmented macule c/w junctional nevus     Mildly variegated pigmented, slightly irregular-bordered macule c/w mildly atypical nevus      Flesh colored to evenly pigmented papule c/w intradermal nevus       Pink pearly papule/plaque c/w basal cell carcinoma      Erythematous hyperkeratotic cursted plaque c/w SCC      Surgical scar with no sign of skin cancer recurrence      Open and closed comedones      Inflammatory papules and pustules      Verrucoid papule consistent consistent with wart     Erythematous eczematous patches and plaques     Dystrophic onycholytic nail with subungual debris c/w onychomycosis     Umbilicated papule    Erythematous-base heme-crusted tan verrucoid plaque consistent with inflamed seborrheic keratosis     Erythematous Silvery Scaling Plaque c/w Psoriasis     See annotation          Assessment / Plan:      Pathology Orders:     Normal Orders This Visit    Tissue Specimen To Pathology, Dermatology     Questions:    Directional Terms:  Other(comment)    Clinical information:  scc vs other    Specific Site:  right arm        History of rhinophyma  Much improved s/p surgical intervention  Discussed rosacea triggers  Diligent sun precautions to prevent recurrence    Actinic keratoses  Cryosurgery Procedure Note    Verbal consent from the patient is obtained and the patient is aware of the precancerous quality and need for treatment of these lesions. Liquid nitrogen cryosurgery is applied to the 9 actinic keratoses, as detailed in the physical exam, to produce a freeze injury. The patient is aware that blisters may form and is instructed on wound care with gentle cleansing and use of vaseline ointment to keep moist until healed. The patient is supplied a handout on cryosurgery and is instructed to call  if lesions do not completely resolve. Discussed risk postinflammatory pigmentary changes.    recheck arms in 6-8 weeks     Neoplasm of uncertain behavior of skin  -     Tissue Specimen To Pathology, Dermatology    If biopsy reveals malignancy, will refer to Dr. Almanzar for Mohs surgery consultation.    Shave biopsy procedure note:    Shave biopsy performed after verbal consent including risk of infection, scar, recurrence, need for additional treatment of site. Area prepped with alcohol, anesthetized with approximately 1.0cc of 1% lidocaine with epinephrine. Lesional tissue shaved with razor blade. Hemostasis achieved with application of aluminum chloride followed by hyfrecation. No complications. Dressing applied. Wound care explained.        Seborrheic keratoses, chest and scalp  These are benign inherited growths without a malignant potential. Reassurance given to patient. No treatment is necessary.       Cutaneous skin tags  Reassurance given to patient. No treatment is necessary.   Treatment of benign, asymptomatic lesions may be considered cosmetic.      Multiple benign nevi, trunk  Discussed ABCDE's of nevi.  Monitor for new mole or moles that are becoming bigger, darker, irritated, or developing irregular borders.                Follow-up in about 6 weeks (around 5/15/2018).

## 2018-04-06 ENCOUNTER — DOCUMENTATION ONLY (OUTPATIENT)
Dept: PULMONOLOGY | Facility: CLINIC | Age: 68
End: 2018-04-06

## 2018-04-06 NOTE — PROGRESS NOTES
I received download from [employer], but unfortunately download was from 2016.  Patient states that he physically brought in his card recently for download.  We will re-request the current download from [employer].  Hopefully this will be available.  Spoke with patient

## 2018-04-11 ENCOUNTER — TELEPHONE (OUTPATIENT)
Dept: DERMATOLOGY | Facility: CLINIC | Age: 68
End: 2018-04-11

## 2018-04-11 NOTE — TELEPHONE ENCOUNTER
4-11-18 called patient with path report and he is scheduled for a consult with Dr. Gant on 5-9-18. This is the date the patient requested. Iris

## 2018-05-07 ENCOUNTER — PATIENT MESSAGE (OUTPATIENT)
Dept: FAMILY MEDICINE | Facility: CLINIC | Age: 68
End: 2018-05-07

## 2018-05-09 ENCOUNTER — INITIAL CONSULT (OUTPATIENT)
Dept: DERMATOLOGY | Facility: CLINIC | Age: 68
End: 2018-05-09
Payer: MEDICARE

## 2018-05-09 VITALS
SYSTOLIC BLOOD PRESSURE: 153 MMHG | WEIGHT: 306 LBS | HEIGHT: 68 IN | BODY MASS INDEX: 46.38 KG/M2 | DIASTOLIC BLOOD PRESSURE: 87 MMHG | HEART RATE: 63 BPM

## 2018-05-09 DIAGNOSIS — C44.622 SQUAMOUS CELL CANCER OF SKIN OF FOREARM, RIGHT: Primary | ICD-10-CM

## 2018-05-09 PROCEDURE — 99214 OFFICE O/P EST MOD 30 MIN: CPT | Mod: S$PBB,,, | Performed by: DERMATOLOGY

## 2018-05-09 PROCEDURE — 99213 OFFICE O/P EST LOW 20 MIN: CPT | Mod: PBBFAC,PO | Performed by: DERMATOLOGY

## 2018-05-09 PROCEDURE — 99999 PR PBB SHADOW E&M-EST. PATIENT-LVL III: CPT | Mod: PBBFAC,,, | Performed by: DERMATOLOGY

## 2018-05-09 NOTE — PROGRESS NOTES
ALLERGIES:  No known drug allergies    CHIEF COMPLAINT:  This 67 y.o. male comes for evaluation for Mohs' Micrographic Surgery, Fresh Tissue Technique, for treatment of a biopsy-proven squamous cell carcinoma on the right arm. Consultation requested by Maryam Stiles M.D..    HISTORY OF PRESENT ILLNESS:   Location: right arm  Duration: 2 years  or more  Quality: persistent  Context: status post biopsy by Maryam Stiles M.D.; path = squamous cell carcinoma; pathology accession #[id number][facility]   Prior Treatment: none    See also the handwritten notes/diagrams scanned to chart for additional details.    Defibrillator: No  Pacemaker: No  Artificial heart valves: No  Artificial joints: No    REVIEW OF SYSTEMS:   General: general health good  Skin: has no previous history of skin cancer(s)  CV: has hypertension, no artificial valves, has no chest pain  Resp: has exertional shortness of breath; has sleep apnea  Endo: has no diabetes  Hem/Lymph: taking prescribed anticoagulants-ASA, has no easy bruising/bleeding  Allergy/Immuno: has no allergies as noted above  GI: has no history of hepatitis  MS: as noted above     PAST MEDICAL HISTORY:  Past Medical History:   Diagnosis Date    Cerebrovascular disease 3/15/2013    LUGO (dyspnea on exertion)     History of colon polyps     Adenomatous polyp of colon (D12.6)    History of nephrolithiasis     History of seasonal allergies     Hyperlipidemia LDL goal < 70     Hypertension     Sleep apnea     compliant with CPAP       PAST SURGICAL HISTORY:  Past Surgical History:   Procedure Laterality Date    CYST REMOVAL      HERNIA REPAIR      umbilical hernia repair    CT COLONOSCOPY,BIOPSY  4/17/2012    repeat colonoscopy 2015    rhino fyma N[facility]A     [facility]      SPINE BIOPSY      Cyst Removed    TONSILLECTOMY      UVULOPALATOPHARYNGOPLASTY          SOCIAL HISTORY:  Dependencies: smoking status as noted below  Social History   Substance Use  Topics    Smoking status: Former Smoker     Quit date: 5/23/1982    Smokeless tobacco: Former User     Quit date: 5/23/1982    Alcohol use No       PERTINENT MEDICATIONS:  See medications list.    Current Outpatient Prescriptions:     ASCORBIC ACID (FRUIT C-500 ORAL), Take 1 tablet by mouth 2 (two) times daily., Disp: , Rfl:     aspirin 325 MG tablet, Take 325 mg by mouth once daily., Disp: , Rfl:     atorvastatin (LIPITOR) 40 MG tablet, Take 1 tablet (40 mg total) by mouth once daily., Disp: 90 tablet, Rfl: 3    diclofenac sodium (VOLTAREN) 1 % Gel, Apply 2 g topically once daily., Disp: 1 Tube, Rfl: 1    fluticasone (FLONASE) 50 mcg/actuation nasal spray, 2 sprays by Each Nare route once daily. (Patient taking differently: 2 sprays by Each Nare route daily as needed. ), Disp: 16 g, Rfl: 0    multivitamin-minerals-lutein (CENTRUM SILVER) Tab, Take 1 tablet by mouth once daily. Every day, Disp: , Rfl:     omega-3 acid ethyl esters (LOVAZA) 1 gram capsule, Take 2 capsules (2 g total) by mouth 2 (two) times daily., Disp: 360 capsule, Rfl: 3    valsartan (DIOVAN) 160 MG tablet, Take 1 tablet (160 mg total) by mouth once daily., Disp: 90 tablet, Rfl: 3    ALLERGIES:  No known drug allergies    EXAM:  See also the handwritten notes/diagrams scanned to chart for additional details.  Constitutional  General appearance: well-developed, obese, well-kempt older white male    Eyes  Inspection of conjunctivae and lids reveals no abnormalities; sclerae anicteric  Neurologic/Psychiatric  Alert,  normal orientation to time, place, person  Normal mood and affect with no evidence of depression, anxiety, agitation  Skin: see photo(s)  Head: background marked solar damage to exposed areas of skin  Neck: examination reveals marked chronic solar damage  Right upper extremity: examination reveals marked chronic solar damage; no ecchymosis/ecchymoses; ; in addition, inspection/palpation reveals an ill-defined, approximately 1  cm pink biopsy site on the right mid extensor forearm, which feels freely movable over the underlying tissues on palpation; site(s) confirmed by reference to the photograph(s) in the chart taken at the time of the biopsy/biopsies by the referring physician and he confirmed this as the site of the prior biopsy  Left upper extremity: examination reveals marked chronic solar damage; no ecchymosis/ecchymoses     ASSESSMENT: 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 In particular, the discussion included, but was not limited to, the following:    One alternative at this point would be to defer further treatment and observe the lesion. With small skin lesions of this kind, it is possible that a biopsy can be sufficient to definitively treat them. Alternatively, some such skin cancers are slow growing and do not require immediate treatment. The potential advantage of this choice would be to avoid the need for possibly unnecessary additional surgery. Among the potential disadvantages of this would be the possibility of enlargement of the lesion, more extensive spread of the lesion or recurrence at a later date, which might necessitate a larger and more complex surgery.    Radiation treatment can be an effective treatment for this type of skin cancer. The usual course of treatment is every weekday for several weeks. Local irritation will result from treatment, although no systemic side effects are expected. The potential advantage of radiation treatment is that it avoids the need for surgery. Among the disadvantages of radiation treatment are  the length of treatment, the local inflammatory response, the absence of pathologic confirmation of the removal of the skin cancer, a possible increased risk of additional skin cancer in the treated area in later years, and a somewhat increased risk of recurrence at a later date.     Excisional surgery can be an effective treatment for this type of skin cancer. This would involve excision of the lesion with margin evaluation by submitting the specimen to a pathologist for either immediate marginal assessment via frozen section processing, or delayed marginal assessment by fixed-tissue processing. The potential advantage of this technique is that it offers a way of treating the lesion with some degree of histologic confirmation of tumor removal. Among the disadvantages of this treatment are the possible need for re-excision if marginal involvement is identified, and the general potential risks of surgery, including allergic reactions to the anesthetic and other materials used, infection, injury to nerves in the area with consequent loss of sensation or muscle function, and scarring or distortion of surrounding structures.    I discussed with the patient the fact that this lesion does not meet the local Medicare carrier's published criteria for coverage of treatment via Mohs surgery for the following reason(s):   Location on the trunk and proximal extremities and size less than 2 cm   Non-aggressive histology     I explained that, under these circumstances, Medicare would be unlikely to pay for treatment of the lesion by means of Mohs surgery. After discussing this, we have decided to proceed with routine excision/closure and fixed tissue margin evaluation.     All questions were answered to his satisfaction. He fully understands the aims, risks, alternatives, and possible complications, and has elected to proceed with the surgery, and verbally consented to do so. The procedure will be scheduled in the near  future.    Routine pre-op instructions were given to him.    I instructed him to discontinue ASA prior to surgery.       --------------------------------------  Note: Some or all of this note may have been generated using voice recognition software. There may be voice recognition errors including grammatical and/or spelling errors found in the text. Attempts were made to correct these errors prior to signature.

## 2018-05-09 NOTE — LETTER
May 9, 2018      Maryam Stiles MD  [address] [facility] [address] [place] [address]           [facility]  [address] [facility] [address] [place] [address]  Phone: [phone]          Patient: Marvin Chacon Jr.   MR Number: [id number]   YOB: 1950   Date of Visit: 5/9/2018       Dear Dr. Maryam Stiles:    Thank you for referring Marvin Chacon to me for evaluation. Attached you will find relevant portions of my assessment and plan of care.    If you have questions, please do not hesitate to call me. I look forward to following Marvin Chacon along with you.    Sincerely,    Silas Almanzar MD    Enclosure  CC:  No Recipients    If you would like to receive this communication electronically, please contact [email or web address] or [phone] to request more information on [employer] Link access.    For providers and/or their staff who would like to refer a patient to [facility], please contact us through our one-stop-shop provider referral line, [facility], at [phone].    If you feel you have received this communication in error or would no longer like to receive these types of communications, please e-mail [email or web address]

## 2018-05-16 ENCOUNTER — PATIENT MESSAGE (OUTPATIENT)
Dept: DERMATOLOGY | Facility: CLINIC | Age: 68
End: 2018-05-16

## 2018-06-01 ENCOUNTER — PATIENT MESSAGE (OUTPATIENT)
Dept: SLEEP MEDICINE | Facility: CLINIC | Age: 68
End: 2018-06-01

## 2018-06-01 ENCOUNTER — PATIENT MESSAGE (OUTPATIENT)
Dept: DERMATOLOGY | Facility: CLINIC | Age: 68
End: 2018-06-01

## 2018-06-04 ENCOUNTER — PATIENT MESSAGE (OUTPATIENT)
Dept: SLEEP MEDICINE | Facility: CLINIC | Age: 68
End: 2018-06-04

## 2018-06-05 ENCOUNTER — OFFICE VISIT (OUTPATIENT)
Dept: SLEEP MEDICINE | Facility: CLINIC | Age: 68
End: 2018-06-05
Payer: MEDICARE

## 2018-06-05 VITALS
WEIGHT: 307.56 LBS | HEIGHT: 68 IN | SYSTOLIC BLOOD PRESSURE: 124 MMHG | DIASTOLIC BLOOD PRESSURE: 80 MMHG | HEART RATE: 62 BPM | OXYGEN SATURATION: 94 % | BODY MASS INDEX: 46.61 KG/M2 | RESPIRATION RATE: 18 BRPM

## 2018-06-05 DIAGNOSIS — G47.33 OSA (OBSTRUCTIVE SLEEP APNEA): Primary | ICD-10-CM

## 2018-06-05 DIAGNOSIS — E66.01 MORBID OBESITY WITH BMI OF 45.0-49.9, ADULT: ICD-10-CM

## 2018-06-05 PROCEDURE — 99213 OFFICE O/P EST LOW 20 MIN: CPT | Mod: PBBFAC,PO | Performed by: NURSE PRACTITIONER

## 2018-06-05 PROCEDURE — 99999 PR PBB SHADOW E&M-EST. PATIENT-LVL III: CPT | Mod: PBBFAC,,, | Performed by: NURSE PRACTITIONER

## 2018-06-05 PROCEDURE — 99213 OFFICE O/P EST LOW 20 MIN: CPT | Mod: S$PBB,,, | Performed by: NURSE PRACTITIONER

## 2018-06-05 NOTE — PROGRESS NOTES
"Subjective:      Patient ID: Marvin Chacon Jr. is a 67 y.o. male.    Chief Complaint: Sleep Apnea    Patient presents to the office today for evaluation of BiPAP setting use.  She patient benefits from use in wears nightly.  Pressure setting 17/14 cm water pressure.  His diagnostic  events per hour.  He understands need to lose weight.   Patient Active Problem List:     Phlebitis and thrombophlebitis of unspecified site     History of adenomatous polyp of colon     Hypertension     CVA (cerebral infarction)     Hyperlipidemia     Cerebrovascular disease     Hypogonadism male     Erectile disorder due to medical condition in male patient     History of colon polyps     Colon polyp     Family history of colon cancer     Male hypogonadism     Obstructive sleep apnea     Cubital tunnel syndrome     Bilateral carpal tunnel syndrome     Carpal tunnel syndrome     Chronic edema     Rhinophyma     Behaviorally induced insufficient sleep syndrome     Periodic limb movement disorder (PLMD)            /80   Pulse 62   Resp 18   Ht 5' 8" (1.727 m)   Wt (!) 139.5 kg (307 lb 8.7 oz)   SpO2 (!) 94%   BMI 46.76 kg/m²   Body mass index is 46.76 kg/m².    Review of Systems   Constitutional: Negative.    HENT: Negative.    Respiratory: Negative.    Cardiovascular: Negative.    Musculoskeletal: Negative.    Gastrointestinal: Negative.    Neurological: Negative.    Psychiatric/Behavioral: Negative.      Objective:      Physical Exam   Constitutional: He is oriented to person, place, and time. He appears well-developed and well-nourished.   Obese   HENT:   Head: Normocephalic and atraumatic.   Neck: Normal range of motion. Neck supple.   Neck circumference:21 inches    Cardiovascular: Normal rate and regular rhythm.    Pulmonary/Chest: Effort normal and breath sounds normal.   Abdominal: Soft.   Musculoskeletal: He exhibits no edema.   Neurological: He is alert and oriented to person, place, and time.   Skin: Skin is " warm and dry.   Psychiatric: He has a normal mood and affect.     Personal Diagnostic Review  BiPAP download shows patient wears on average 7 hrs and 30 minutes. Greater than 4 hrs 100 % of the time. AHI 4.7 over the last 90 days.         Assessment:       1. ALAN (obstructive sleep apnea)    2. Morbid obesity with BMI of 45.0-49.9, adult        Outpatient Encounter Prescriptions as of 6/5/2018   Medication Sig Dispense Refill    ASCORBIC ACID (FRUIT C-500 ORAL) Take 1 tablet by mouth 2 (two) times daily.      aspirin 325 MG tablet Take 325 mg by mouth once daily.      atorvastatin (LIPITOR) 40 MG tablet Take 1 tablet (40 mg total) by mouth once daily. 90 tablet 3    diclofenac sodium (VOLTAREN) 1 % Gel Apply 2 g topically once daily. 1 Tube 1    fluticasone (FLONASE) 50 mcg/actuation nasal spray 2 sprays by Each Nare route once daily. (Patient taking differently: 2 sprays by Each Nare route daily as needed. ) 16 g 0    multivitamin-minerals-lutein (CENTRUM SILVER) Tab Take 1 tablet by mouth once daily. Every day      omega-3 acid ethyl esters (LOVAZA) 1 gram capsule Take 2 capsules (2 g total) by mouth 2 (two) times daily. 360 capsule 3    valsartan (DIOVAN) 160 MG tablet Take 1 tablet (160 mg total) by mouth once daily. 90 tablet 3     No facility-administered encounter medications on file as of 6/5/2018.      Orders Placed This Encounter   Procedures    CPAP/BIPAP SUPPLIES     APRIA     Order Specific Question:   Type of mask:     Answer:   Nasal     Order Specific Question:   Headgear?     Answer:   Yes     Order Specific Question:   Tubing?     Answer:   Yes     Order Specific Question:   Humidifier chamber?     Answer:   Yes     Order Specific Question:   Chin strap?     Answer:   Yes     Order Specific Question:   Filters?     Answer:   Yes     Order Specific Question:   Length of need (1-99 months):     Answer:   99     Plan:      Weight loss and exercise to improve overall health.  Doing well on PAP  settings. Patient is compliant. Follow up in 12 months with PAP data download or call earlier if any problems.

## 2018-06-11 NOTE — PROGRESS NOTES
ALLERGIES:  No known drug allergies  -------------------------------------------------------------  PROCEDURE: Excisional surgery    SITE: right forearm     INDICATION: squamous cell carcinoma      ANESTHETIC: 3 mL 2% Lidocaine with Epinephrine 1:100,000    SURGICAL PREP: Ethanol and Hibiclens    SURGEON: Silas Almanzar MD    ASSISTANTS: Marcos Marrero CST     PREOPERATIVE DIAGNOSIS: as noted below    POSTOPERATIVE DIAGNOSIS: as noted below    PRIOR PATHOLOGY:   FINAL PATHOLOGIC DIAGNOSIS  1. Skin, right arm, shave biopsy:  - SUPERFICIALLY INVASIVE SQUAMOUS CELL CARCINOMA.  - THE TUMOR EXTENDS TO THE DEEP BIOPSY MARGIN.  MICROSCOPIC DESCRIPTION: Sections show a squamous cell proliferation exhibiting atypia and infiltrating within  the superficial dermis.  Diagnosed by: Leora Pardo M.D.  (Electronically Signed: 2018-04-10 17:21:50)    HEMOSTASIS: Hyfrecation     INITIAL LESION+MARGINS SIZE: 1.5 x 1.8 cm    LENGTH OF FINAL CLOSURE: 4.3 cm      NARRATIVE:    INDICATIONS:  The patient is a 67 y.o. male with a biopsy-proven squamous cell carcinoma on the right forearm. In light of the nature of this lesion and the location, excisional surgery was thought to be the most appropriate management choice.  I discussed it with the patient and he fully understands the aims, risks, alternatives, and possible complications, and elects to proceed.  There are no medical or surgical contraindications to the procedure.    A signed informed consent was obtained.    PROCEDURE:  The patient was placed in the supine position on the operating table in the Surgery Suite, with his arm resting on a arm board.  The area was thoroughly prepared with ethanol and Hibiclens. A sterile surgical marker was used to outline the clinical margins of the lesion and appropriate surgical excision margins. The resulting area to be excised measured 1.5 x 1.8 cm in size. Triangles of additional skin to be excised were then outlined on either side of  the site, to create a fusiform-shaped area to be excised, in order to avoid the creation of standing cone deformities. Local anesthesia of 2% Lidocaine with 1:100,000 epinephrine was administered via injection.  A total of 3 mL was used throughout the entire procedure.  The area was draped in the standard manner.  A fusiform excision was carried out to include the lesion and the previously marked excisional margins. Hemostasis was achieved by hyfrecation of bleeding points in the wound. The specimen was tagged with suture at the proximal/superior tip.  The specimen was placed in a container of formalin to be submitted to pathology.    Repair via Intermediate (Layered) Closure:  Because of the size, shape and location of the defect, simple closure could not be achieved without excessive tension on the wound margins and an unacceptable risk of wound dehiscence. The fusiform defect resulting from the excision measured 1.5 x 4.3 cm in size.  Wound margins were minimally undermined to allow closure with minimal tension. After hemostasis was achieved with the hyfrecator, closure was accomplished in layered fashion with five #3-0 Vicryl buried interrupted sutures to close the subcutaneous tissues and deep dermis and multiple #3-0 Prolene simple interrupted suture(s) and vertical mattress suture(s) for final approximation of the wound margins.    Final dressing consisted of petrolatum, Telfa, and tape.    Estimated blood loss for the total procedure was less than 10 mL.    Written and verbal wound care instructions were reviewed with the patient who expressed understanding.  He tolerated the procedure well and left the procedure room in good condition; he is to return in 14 days for suture removal.     The patient is to call prn with any questions or problems which arise.

## 2018-06-12 ENCOUNTER — PROCEDURE VISIT (OUTPATIENT)
Dept: DERMATOLOGY | Facility: CLINIC | Age: 68
End: 2018-06-12
Payer: MEDICARE

## 2018-06-12 VITALS
HEART RATE: 67 BPM | SYSTOLIC BLOOD PRESSURE: 120 MMHG | HEIGHT: 68 IN | DIASTOLIC BLOOD PRESSURE: 70 MMHG | BODY MASS INDEX: 47.29 KG/M2 | WEIGHT: 312 LBS

## 2018-06-12 DIAGNOSIS — C44.622 SQUAMOUS CELL CANCER OF SKIN OF RIGHT FOREARM: Primary | ICD-10-CM

## 2018-06-12 PROCEDURE — 11602 EXC TR-EXT MAL+MARG 1.1-2 CM: CPT | Mod: 51,S$PBB,, | Performed by: DERMATOLOGY

## 2018-06-12 PROCEDURE — 12032 INTMD RPR S/A/T/EXT 2.6-7.5: CPT | Mod: PBBFAC,PO | Performed by: DERMATOLOGY

## 2018-06-12 PROCEDURE — 12032 INTMD RPR S/A/T/EXT 2.6-7.5: CPT | Mod: S$PBB,,, | Performed by: DERMATOLOGY

## 2018-06-12 PROCEDURE — 99499 UNLISTED E&M SERVICE: CPT | Mod: S$PBB,,, | Performed by: DERMATOLOGY

## 2018-06-12 PROCEDURE — 11602 EXC TR-EXT MAL+MARG 1.1-2 CM: CPT | Mod: PBBFAC,PO | Performed by: DERMATOLOGY

## 2018-06-12 PROCEDURE — 88305 TISSUE EXAM BY PATHOLOGIST: CPT | Performed by: PATHOLOGY

## 2018-06-25 ENCOUNTER — TELEPHONE (OUTPATIENT)
Dept: DERMATOLOGY | Facility: CLINIC | Age: 68
End: 2018-06-25

## 2018-06-25 NOTE — TELEPHONE ENCOUNTER
6-25-18 Patient called me back and I gave him the path report and he h as an appointment for 6-26-18.

## 2018-06-25 NOTE — TELEPHONE ENCOUNTER
----- Message from Silas Almanzar MD sent at 6/23/2018  6:39 PM CDT -----  This did show skin cancer, as expected; it has been completely removed. Please followup as scheduled for removal of the sutures.   FINAL PATHOLOGIC DIAGNOSIS  1. Skin, right forearm, excision:  - RESIDUAL INVASIVE SQUAMOUS CELL CARCINOMA.  - MARGINS ARE NEGATIVE.  - DERMAL SCAR.  Diagnosed by: Leora Pardo M.D.  (Electronically Signed: 2018-06-18 15:54:20)

## 2018-06-26 ENCOUNTER — OFFICE VISIT (OUTPATIENT)
Dept: DERMATOLOGY | Facility: CLINIC | Age: 68
End: 2018-06-26
Payer: MEDICARE

## 2018-06-26 DIAGNOSIS — Z48.02 VISIT FOR SUTURE REMOVAL: Primary | ICD-10-CM

## 2018-06-26 PROCEDURE — 99999 PR PBB SHADOW E&M-EST. PATIENT-LVL II: CPT | Mod: PBBFAC,,, | Performed by: DERMATOLOGY

## 2018-06-26 PROCEDURE — 99212 OFFICE O/P EST SF 10 MIN: CPT | Mod: PBBFAC,PO | Performed by: DERMATOLOGY

## 2018-06-26 PROCEDURE — 99024 POSTOP FOLLOW-UP VISIT: CPT | Mod: POP,,, | Performed by: DERMATOLOGY

## 2018-06-26 NOTE — PROGRESS NOTES
CC: 67 y.o.male patient is here for suture removal.     HPI: Patient is 2 weeks s/p excision of a squamous cell carcinoma on the right forearm, with subsequent repair   Patient reports no problems.    EXAM:  Sutures intact.  Wound healing well.  Good approximation of skin edges.  Some mild erythema noted to the oleg-incisional skin; no evidence of wound exudate or breakdown    Pathology: as noted below  FINAL PATHOLOGIC DIAGNOSIS   1. Skin, right forearm, excision:   - RESIDUAL INVASIVE SQUAMOUS CELL CARCINOMA.   - MARGINS ARE NEGATIVE.   - DERMAL SCAR.   Diagnosed by: Leora Pardo M.D.   (Electronically Signed: 2018-06-18 15:54:20)      IMPRESSION:  Healing well post excision and repair    PLAN:  Site cleaned with peroxide, sutures removed  Dressed with petrolatum   Discussed pathology results  Reviewed further care and expected course  Followup to Dr. Stiles in 3-4 months; call PRN to me

## 2018-07-10 PROBLEM — L97.919 VENOUS ULCER OF RIGHT LEG: Status: ACTIVE | Noted: 2018-07-10

## 2018-07-10 PROBLEM — I83.019 VENOUS ULCER OF RIGHT LEG: Status: ACTIVE | Noted: 2018-07-10

## 2018-07-26 ENCOUNTER — LAB VISIT (OUTPATIENT)
Dept: LAB | Facility: HOSPITAL | Age: 68
End: 2018-07-26
Attending: FAMILY MEDICINE
Payer: MEDICARE

## 2018-07-26 ENCOUNTER — OFFICE VISIT (OUTPATIENT)
Dept: FAMILY MEDICINE | Facility: CLINIC | Age: 68
End: 2018-07-26
Payer: MEDICARE

## 2018-07-26 VITALS
DIASTOLIC BLOOD PRESSURE: 81 MMHG | HEART RATE: 110 BPM | TEMPERATURE: 103 F | HEIGHT: 68 IN | WEIGHT: 302.19 LBS | SYSTOLIC BLOOD PRESSURE: 125 MMHG | BODY MASS INDEX: 45.8 KG/M2

## 2018-07-26 DIAGNOSIS — R50.9 FEVER, UNSPECIFIED FEVER CAUSE: Primary | ICD-10-CM

## 2018-07-26 DIAGNOSIS — R50.9 FEVER, UNSPECIFIED FEVER CAUSE: ICD-10-CM

## 2018-07-26 LAB
BASOPHILS # BLD AUTO: 0.04 K/UL
BASOPHILS NFR BLD: 0.3 %
BILIRUB UR QL STRIP: NEGATIVE
CLARITY UR: CLEAR
COLOR UR: YELLOW
DIFFERENTIAL METHOD: ABNORMAL
EOSINOPHIL # BLD AUTO: 0.3 K/UL
EOSINOPHIL NFR BLD: 2.3 %
ERYTHROCYTE [DISTWIDTH] IN BLOOD BY AUTOMATED COUNT: 14.9 %
GLUCOSE UR QL STRIP: NEGATIVE
HCT VFR BLD AUTO: 46.4 %
HGB BLD-MCNC: 14.8 G/DL
HGB UR QL STRIP: NEGATIVE
IMM GRANULOCYTES # BLD AUTO: 0.07 K/UL
IMM GRANULOCYTES NFR BLD AUTO: 0.5 %
KETONES UR QL STRIP: NEGATIVE
LEUKOCYTE ESTERASE UR QL STRIP: NEGATIVE
LYMPHOCYTES # BLD AUTO: 0.5 K/UL
LYMPHOCYTES NFR BLD: 3.4 %
MCH RBC QN AUTO: 28.1 PG
MCHC RBC AUTO-ENTMCNC: 31.9 G/DL
MCV RBC AUTO: 88 FL
MONOCYTES # BLD AUTO: 1 K/UL
MONOCYTES NFR BLD: 6.6 %
NEUTROPHILS # BLD AUTO: 12.7 K/UL
NEUTROPHILS NFR BLD: 86.9 %
NITRITE UR QL STRIP: NEGATIVE
NRBC BLD-RTO: 0 /100 WBC
PH UR STRIP: 6 [PH] (ref 5–8)
PLATELET # BLD AUTO: 279 K/UL
PMV BLD AUTO: 10.4 FL
PROT UR QL STRIP: NEGATIVE
RBC # BLD AUTO: 5.26 M/UL
SP GR UR STRIP: 1.01 (ref 1–1.03)
URN SPEC COLLECT METH UR: NORMAL
WBC # BLD AUTO: 14.62 K/UL

## 2018-07-26 PROCEDURE — 99999 PR PBB SHADOW E&M-EST. PATIENT-LVL III: CPT | Mod: PBBFAC,,, | Performed by: NURSE PRACTITIONER

## 2018-07-26 PROCEDURE — 99214 OFFICE O/P EST MOD 30 MIN: CPT | Mod: S$PBB,,, | Performed by: NURSE PRACTITIONER

## 2018-07-26 PROCEDURE — 36415 COLL VENOUS BLD VENIPUNCTURE: CPT | Mod: PO

## 2018-07-26 PROCEDURE — 81002 URINALYSIS NONAUTO W/O SCOPE: CPT | Mod: PO

## 2018-07-26 PROCEDURE — 99213 OFFICE O/P EST LOW 20 MIN: CPT | Mod: PBBFAC,PO | Performed by: NURSE PRACTITIONER

## 2018-07-26 PROCEDURE — 85025 COMPLETE CBC W/AUTO DIFF WBC: CPT

## 2018-07-26 RX ORDER — IRBESARTAN 150 MG/1
150 TABLET ORAL DAILY
COMMUNITY
End: 2020-04-08

## 2018-07-26 RX ORDER — TORSEMIDE 10 MG/1
10 TABLET ORAL DAILY
COMMUNITY
End: 2019-04-03

## 2018-07-26 NOTE — PROGRESS NOTES
Subjective:       Patient ID: Marvin Chacon Jr. is a 67 y.o. male.    Chief Complaint: Tremors    HPI     He comes into the office with complaints of elevated temperature, malaise, chills, sweats, and trembling for the last 2 days.  He was seen at an ancillary Clinic, chest x-ray was performed and he was discharged home with no medications.  He states that he has continued to feel bad.  He denies any shortness of breath or chest pain, no upper respiratory symptoms, no urinary symptoms.  Denies vomiting or diarrhea.  No headaches or blurry vision.  Appetite is good, he ate a full lunch today.    Review of Systems   Constitutional: Positive for fatigue and fever. Negative for unexpected weight change.   HENT: Negative for ear pain and sore throat.    Eyes: Negative.  Negative for pain and visual disturbance.   Respiratory: Negative for cough and shortness of breath.    Cardiovascular: Negative for chest pain and palpitations.   Gastrointestinal: Negative for abdominal pain, diarrhea, nausea and vomiting.   Genitourinary: Negative for dysuria and frequency.   Musculoskeletal: Negative for arthralgias and myalgias.   Skin: Negative for color change and rash.   Neurological: Negative for dizziness and headaches.   Psychiatric/Behavioral: Negative for sleep disturbance. The patient is not nervous/anxious.        Vitals:    07/26/18 1535   BP: 125/81   Pulse: 110   Temp: (!) 102.8 °F (39.3 °C)       Objective:     Current Outpatient Prescriptions   Medication Sig Dispense Refill    aspirin (ECOTRIN) 81 MG EC tablet Take 81 mg by mouth once daily.      atorvastatin (LIPITOR) 40 MG tablet Take 1 tablet (40 mg total) by mouth once daily. 90 tablet 3    clopidogrel (PLAVIX) 75 mg tablet Take 75 mg by mouth once daily.      gabapentin (NEURONTIN) 300 MG capsule Take 300 mg by mouth 2 (two) times daily.      irbesartan (AVAPRO) 150 MG tablet Take 150 mg by mouth once daily.      omega-3 acid ethyl esters (LOVAZA) 1 gram  capsule Take 2 capsules (2 g total) by mouth 2 (two) times daily. 360 capsule 3    torsemide (DEMADEX) 10 MG Tab Take 10 mg by mouth once daily.      valsartan (DIOVAN) 160 MG tablet Take 1 tablet (160 mg total) by mouth once daily. 90 tablet 3    multivitamin-minerals-lutein (CENTRUM SILVER) Tab Take 1 tablet by mouth once daily. Every day       No current facility-administered medications for this visit.        Physical Exam   Constitutional: He is oriented to person, place, and time. He appears well-developed. No distress.   Elevated temp of 102.8   HENT:   Head: Normocephalic and atraumatic.   Eyes: EOM are normal. Pupils are equal, round, and reactive to light.   Neck: Normal range of motion. Neck supple.   Cardiovascular: Normal rate and regular rhythm.    Pulmonary/Chest: Effort normal. He has wheezes (mild inspiratory).   Musculoskeletal: Normal range of motion.   Neurological: He is alert and oriented to person, place, and time.   Skin: Skin is warm and dry. No rash noted.   Psychiatric: He has a normal mood and affect. Thought content normal.   Nursing note and vitals reviewed.      Assessment:       1. Fever, unspecified fever cause        Plan:   Fever, unspecified fever cause  -     Urinalysis  -     CBC auto differential; Future; Expected date: 07/26/2018  -     Cancel: X-Ray Chest PA And Lateral; Future; Expected date: 07/26/2018     Over-the-counter Tylenol for fever, increase intake of liquids  Waiting on the lab results    No Follow-up on file.    There are no Patient Instructions on file for this visit.

## 2018-07-27 RX ORDER — LOSARTAN POTASSIUM 50 MG/1
50 TABLET ORAL DAILY
COMMUNITY
End: 2019-02-07

## 2018-07-27 RX ORDER — LOSARTAN POTASSIUM 50 MG/1
50 TABLET ORAL DAILY
Qty: 90 TABLET | Refills: 3 | Status: CANCELLED | OUTPATIENT
Start: 2018-07-27

## 2018-07-27 RX ORDER — AMLODIPINE BESYLATE 5 MG/1
5 TABLET ORAL DAILY
Qty: 90 TABLET | Refills: 3 | Status: SHIPPED | OUTPATIENT
Start: 2018-07-27 | End: 2019-02-07

## 2018-07-27 NOTE — TELEPHONE ENCOUNTER
I am changing my mind on changing his diovan to losartan.  He is already on an ARB.  Therefore, I will change the order for losartan to amlodipine 5 mg po q day  Book him tosee the nP in 2 weeks to check the bp.      Medications Ordered This Encounter      amLODIPine (NORVASC) 5 MG tablet          Sig: Take 1 tablet (5 mg total) by mouth once daily.          Dispense:  90 tablet          Refill:  3

## 2018-08-02 ENCOUNTER — PATIENT MESSAGE (OUTPATIENT)
Dept: FAMILY MEDICINE | Facility: CLINIC | Age: 68
End: 2018-08-02

## 2018-09-11 ENCOUNTER — OFFICE VISIT (OUTPATIENT)
Dept: DERMATOLOGY | Facility: CLINIC | Age: 68
End: 2018-09-11
Payer: MEDICARE

## 2018-09-11 DIAGNOSIS — L82.0 SEBORRHEIC KERATOSES, INFLAMED: ICD-10-CM

## 2018-09-11 DIAGNOSIS — Z12.83 SKIN CANCER SCREENING: ICD-10-CM

## 2018-09-11 DIAGNOSIS — L57.0 MULTIPLE ACTINIC KERATOSES: ICD-10-CM

## 2018-09-11 DIAGNOSIS — D48.5 NEOPLASM OF UNCERTAIN BEHAVIOR OF SKIN: ICD-10-CM

## 2018-09-11 DIAGNOSIS — Z85.828 PERSONAL HISTORY OF OTHER MALIGNANT NEOPLASM OF SKIN: Primary | ICD-10-CM

## 2018-09-11 PROCEDURE — 17003 DESTRUCT PREMALG LES 2-14: CPT | Mod: PBBFAC,PO | Performed by: DERMATOLOGY

## 2018-09-11 PROCEDURE — 17110 DESTRUCTION B9 LES UP TO 14: CPT | Mod: PBBFAC,PO | Performed by: DERMATOLOGY

## 2018-09-11 PROCEDURE — 99999 PR PBB SHADOW E&M-EST. PATIENT-LVL II: CPT | Mod: PBBFAC,,, | Performed by: DERMATOLOGY

## 2018-09-11 PROCEDURE — 17003 DESTRUCT PREMALG LES 2-14: CPT | Mod: S$PBB,,, | Performed by: DERMATOLOGY

## 2018-09-11 PROCEDURE — 17110 DESTRUCTION B9 LES UP TO 14: CPT | Mod: S$PBB,,, | Performed by: DERMATOLOGY

## 2018-09-11 PROCEDURE — 99212 OFFICE O/P EST SF 10 MIN: CPT | Mod: PBBFAC,PO,25 | Performed by: DERMATOLOGY

## 2018-09-11 PROCEDURE — 99213 OFFICE O/P EST LOW 20 MIN: CPT | Mod: 25,S$PBB,, | Performed by: DERMATOLOGY

## 2018-09-11 PROCEDURE — 17000 DESTRUCT PREMALG LESION: CPT | Mod: PBBFAC,59,PO | Performed by: DERMATOLOGY

## 2018-09-11 PROCEDURE — 17000 DESTRUCT PREMALG LESION: CPT | Mod: 59,S$PBB,, | Performed by: DERMATOLOGY

## 2018-09-11 PROCEDURE — 11100 PR BIOPSY OF SKIN LESION: CPT | Mod: 59,S$PBB,, | Performed by: DERMATOLOGY

## 2018-09-11 PROCEDURE — 11100 PR BIOPSY OF SKIN LESION: CPT | Mod: 59,PBBFAC,PO | Performed by: DERMATOLOGY

## 2018-09-11 PROCEDURE — 88305 TISSUE EXAM BY PATHOLOGIST: CPT | Performed by: PATHOLOGY

## 2018-09-11 NOTE — PROGRESS NOTES
"  Subjective:       Patient ID:  Marvin Chacon Jr. is a 68 y.o. male who presents for   Chief Complaint   Patient presents with    Skin Check    Lesion     Pt here for follow up visit last seen on 4-3-2018. Multiple AKs treated with cryo at last visit have resolved. Pt s/p Mohs surgery performed by Dr. Almanzar 6/12/2018 [facility] on right arm.  Lesion just above his right ear present for 2 years, painful, not treated  Lesion on left scalp present for a few years, rough, not treated  Lesion on forehead present for 2 years, painful, not treated    FINAL PATHOLOGIC DIAGNOSIS  1. Skin, right arm, shave biopsy:  - SUPERFICIALLY INVASIVE SQUAMOUS CELL CARCINOMA.  - THE TUMOR EXTENDS TO THE DEEP BIOPSY MARGIN.  MICROSCOPIC DESCRIPTION: Sections show a squamous cell proliferation exhibiting atypia and infiltrating within  the superficial dermis.  Diagnosed by: Leora Pardo M.D.  (Electronically Signed: 2018-04-10 17:21:50)  Gross Description  Surgery ID [id number]; Pathology ID [id number]  Received in formalin, labeled with the patient's name and medical record number and designated "right arm" is a  shave biopsy of nonpigmented skin (0.9 x 0.6 cm extending to a depth 0.1 cm). The surface shows a tan-white  irregular papule (approximately 0.6 cm in greatest dimension). Specimen is inked, bisected and entirely submitted  in a single cassette.    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 Pleased with results. No current treatment. 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   Dr. Mortensen removed a growth from nose 7 years ago  He has had benign moles removed in the past  No phx of skin cancer. No fhx of skin cancer  Worked outdoors for several years                          Review of Systems   Skin: Negative for rash, daily sunscreen use, activity-related sunscreen use and recent sunburn.        Objective:    Physical Exam   Constitutional: He appears well-developed and well-nourished. He is obese. "  No distress.   HENT:   Head:       Eyes: Lids are normal.  No conjunctival no injection.   Cardiovascular: There is no local extremity swelling and no dependent edema.     Neurological: He is alert and oriented to person, place, and time. He is not disoriented.   Psychiatric: He has a normal mood and affect.   Skin:   Areas Examined (abnormalities noted in diagram):   Scalp / Hair Palpated and Inspected  Head / Face Inspection Performed  Neck Inspection Performed  Chest / Axilla Inspection Performed  Abdomen Inspection Performed  Back Inspection Performed  RUE Inspected  LUE Inspection Performed              Diagram Legend     Erythematous scaling macule/papule c/w actinic keratosis       Vascular papule c/w angioma      Pigmented verrucoid papule/plaque c/w seborrheic keratosis      Yellow umbilicated papule c/w sebaceous hyperplasia      Irregularly shaped tan macule c/w lentigo     1-2 mm smooth white papules consistent with Milia      Movable subcutaneous cyst with punctum c/w epidermal inclusion cyst      Subcutaneous movable cyst c/w pilar cyst      Firm pink to brown papule c/w dermatofibroma      Pedunculated fleshy papule(s) c/w skin tag(s)      Evenly pigmented macule c/w junctional nevus     Mildly variegated pigmented, slightly irregular-bordered macule c/w mildly atypical nevus      Flesh colored to evenly pigmented papule c/w intradermal nevus       Pink pearly papule/plaque c/w basal cell carcinoma      Erythematous hyperkeratotic cursted plaque c/w SCC      Surgical scar with no sign of skin cancer recurrence      Open and closed comedones      Inflammatory papules and pustules      Verrucoid papule consistent consistent with wart     Erythematous eczematous patches and plaques     Dystrophic onycholytic nail with subungual debris c/w onychomycosis     Umbilicated papule    Erythematous-base heme-crusted tan verrucoid plaque consistent with inflamed seborrheic keratosis     Erythematous Silvery  Scaling Plaque c/w Psoriasis     See annotation      Assessment / Plan:      Pathology Orders:     Normal Orders This Visit    Tissue Specimen To Pathology, Dermatology     Questions:    Directional Terms:  Other(comment)    Clinical information:  sk vs nevus    Specific Site:  right scalp        Personal history of other malignant neoplasm of skin  Area(s) of previous NMSC evaluated with no signs of recurrence.    Upper body skin examination performed today including at least 6 points as noted in physical examination. No lesions suspicious for malignancy noted.      Skin cancer screening  Area(s) of previous NMSC evaluated with no signs of recurrence.    Upper body skin examination performed today including at least 6 points as noted in physical examination. No lesions suspicious for malignancy noted.      Neoplasm of uncertain behavior of skin  -     Tissue Specimen To Pathology, Dermatology  Shave biopsy procedure note:    Shave biopsy performed after verbal consent including risk of infection, scar, recurrence, need for additional treatment of site. Area prepped with alcohol, anesthetized with approximately 1.0cc of 1% lidocaine with epinephrine. Lesional tissue shaved with razor blade. Hemostasis achieved with application of aluminum chloride followed by hyfrecation. No complications. Dressing applied. Wound care explained.      Seborrheic keratoses, inflamed- face and left arm  Cryosurgery procedure note:    Verbal consent from the patient is obtained. Liquid nitrogen cryosurgery is applied to 2 lesions to produce a freeze injury. The patient is aware that blisters may form and is instructed on wound care with gentle cleansing and use of vaseline ointment to keep moist until healed. The patient is supplied a handout on cryosurgery and is instructed to call if lesions do not completely resolve. Risk of dyspigmentation discussed.       Multiple actinic keratoses  Cryosurgery Procedure Note    Verbal consent from  the patient is obtained and the patient is aware of the precancerous quality and need for treatment of these lesions. Liquid nitrogen cryosurgery is applied to the 6 actinic keratoses, as detailed in the physical exam, to produce a freeze injury. The patient is aware that blisters may form and is instructed on wound care with gentle cleansing and use of vaseline ointment to keep moist until healed. The patient is supplied a handout on cryosurgery and is instructed to call if lesions do not completely resolve. Discussed risk postinflammatory pigmentary changes.                Follow-up in about 6 months (around 3/11/2019).

## 2018-10-29 ENCOUNTER — PATIENT MESSAGE (OUTPATIENT)
Dept: FAMILY MEDICINE | Facility: CLINIC | Age: 68
End: 2018-10-29

## 2018-10-30 ENCOUNTER — LAB VISIT (OUTPATIENT)
Dept: LAB | Facility: HOSPITAL | Age: 68
End: 2018-10-30
Attending: INTERNAL MEDICINE
Payer: MEDICARE

## 2018-10-30 DIAGNOSIS — I11.9 MALIGNANT HYPERTENSIVE HEART DISEASE WITHOUT HEART FAILURE: ICD-10-CM

## 2018-10-30 DIAGNOSIS — E78.00 PURE HYPERCHOLESTEROLEMIA: Primary | ICD-10-CM

## 2018-10-30 LAB
ALBUMIN SERPL BCP-MCNC: 3.5 G/DL
ALP SERPL-CCNC: 76 U/L
ALT SERPL W/O P-5'-P-CCNC: 17 U/L
ANION GAP SERPL CALC-SCNC: 5 MMOL/L
AST SERPL-CCNC: 21 U/L
BASOPHILS # BLD AUTO: 0.06 K/UL
BASOPHILS NFR BLD: 0.6 %
BILIRUB SERPL-MCNC: 0.7 MG/DL
BNP SERPL-MCNC: <10 PG/ML
BUN SERPL-MCNC: 11 MG/DL
CALCIUM SERPL-MCNC: 9.6 MG/DL
CHLORIDE SERPL-SCNC: 102 MMOL/L
CHOLEST SERPL-MCNC: 132 MG/DL
CHOLEST/HDLC SERPL: 3.1 {RATIO}
CO2 SERPL-SCNC: 32 MMOL/L
CREAT SERPL-MCNC: 0.9 MG/DL
DIFFERENTIAL METHOD: ABNORMAL
EOSINOPHIL # BLD AUTO: 0.1 K/UL
EOSINOPHIL NFR BLD: 1.1 %
ERYTHROCYTE [DISTWIDTH] IN BLOOD BY AUTOMATED COUNT: 16 %
EST. GFR  (AFRICAN AMERICAN): >60 ML/MIN/1.73 M^2
EST. GFR  (NON AFRICAN AMERICAN): >60 ML/MIN/1.73 M^2
GLUCOSE SERPL-MCNC: 101 MG/DL
HCT VFR BLD AUTO: 48.2 %
HDLC SERPL-MCNC: 43 MG/DL
HDLC SERPL: 32.6 %
HGB BLD-MCNC: 15 G/DL
IMM GRANULOCYTES # BLD AUTO: 0.02 K/UL
IMM GRANULOCYTES NFR BLD AUTO: 0.2 %
LDLC SERPL CALC-MCNC: 57.4 MG/DL
LYMPHOCYTES # BLD AUTO: 1.8 K/UL
LYMPHOCYTES NFR BLD: 18.3 %
MAGNESIUM SERPL-MCNC: 2.3 MG/DL
MCH RBC QN AUTO: 28.7 PG
MCHC RBC AUTO-ENTMCNC: 31.1 G/DL
MCV RBC AUTO: 92 FL
MONOCYTES # BLD AUTO: 0.6 K/UL
MONOCYTES NFR BLD: 6.6 %
NEUTROPHILS # BLD AUTO: 7 K/UL
NEUTROPHILS NFR BLD: 73.2 %
NONHDLC SERPL-MCNC: 89 MG/DL
NRBC BLD-RTO: 0 /100 WBC
PLATELET # BLD AUTO: 264 K/UL
PMV BLD AUTO: 10 FL
POTASSIUM SERPL-SCNC: 4.8 MMOL/L
PROT SERPL-MCNC: 7.5 G/DL
RBC # BLD AUTO: 5.23 M/UL
SODIUM SERPL-SCNC: 139 MMOL/L
TRIGL SERPL-MCNC: 158 MG/DL
WBC # BLD AUTO: 9.58 K/UL

## 2018-10-30 PROCEDURE — 83880 ASSAY OF NATRIURETIC PEPTIDE: CPT

## 2018-10-30 PROCEDURE — 36415 COLL VENOUS BLD VENIPUNCTURE: CPT | Mod: PO

## 2018-10-30 PROCEDURE — 83735 ASSAY OF MAGNESIUM: CPT

## 2018-10-30 PROCEDURE — 80061 LIPID PANEL: CPT

## 2018-10-30 PROCEDURE — 80053 COMPREHEN METABOLIC PANEL: CPT

## 2018-10-30 PROCEDURE — 85025 COMPLETE CBC W/AUTO DIFF WBC: CPT

## 2018-12-30 ENCOUNTER — PATIENT MESSAGE (OUTPATIENT)
Dept: FAMILY MEDICINE | Facility: CLINIC | Age: 68
End: 2018-12-30

## 2019-01-03 ENCOUNTER — OFFICE VISIT (OUTPATIENT)
Dept: FAMILY MEDICINE | Facility: CLINIC | Age: 69
End: 2019-01-03
Payer: MEDICARE

## 2019-01-03 VITALS
HEART RATE: 83 BPM | BODY MASS INDEX: 46.2 KG/M2 | TEMPERATURE: 99 F | SYSTOLIC BLOOD PRESSURE: 132 MMHG | DIASTOLIC BLOOD PRESSURE: 71 MMHG | HEIGHT: 68 IN | WEIGHT: 304.81 LBS

## 2019-01-03 DIAGNOSIS — J06.9 UPPER RESPIRATORY TRACT INFECTION, UNSPECIFIED TYPE: Primary | ICD-10-CM

## 2019-01-03 PROCEDURE — 99999 PR PBB SHADOW E&M-EST. PATIENT-LVL III: ICD-10-PCS | Mod: PBBFAC,,, | Performed by: FAMILY MEDICINE

## 2019-01-03 PROCEDURE — 96372 THER/PROPH/DIAG INJ SC/IM: CPT | Mod: PBBFAC,PO

## 2019-01-03 PROCEDURE — 99213 PR OFFICE/OUTPT VISIT, EST, LEVL III, 20-29 MIN: ICD-10-PCS | Mod: S$PBB,,, | Performed by: FAMILY MEDICINE

## 2019-01-03 PROCEDURE — 99213 OFFICE O/P EST LOW 20 MIN: CPT | Mod: PBBFAC,PO,25 | Performed by: FAMILY MEDICINE

## 2019-01-03 PROCEDURE — 99999 PR PBB SHADOW E&M-EST. PATIENT-LVL III: CPT | Mod: PBBFAC,,, | Performed by: FAMILY MEDICINE

## 2019-01-03 PROCEDURE — 99213 OFFICE O/P EST LOW 20 MIN: CPT | Mod: S$PBB,,, | Performed by: FAMILY MEDICINE

## 2019-01-03 RX ORDER — MONTELUKAST SODIUM 10 MG/1
10 TABLET ORAL NIGHTLY
Qty: 10 TABLET | Refills: 0 | Status: SHIPPED | OUTPATIENT
Start: 2019-01-03 | End: 2019-02-02

## 2019-01-03 RX ORDER — DEXAMETHASONE SODIUM PHOSPHATE 4 MG/ML
8 INJECTION, SOLUTION INTRA-ARTICULAR; INTRALESIONAL; INTRAMUSCULAR; INTRAVENOUS; SOFT TISSUE ONCE
Status: COMPLETED | OUTPATIENT
Start: 2019-01-03 | End: 2019-01-03

## 2019-01-03 RX ADMIN — DEXAMETHASONE SODIUM PHOSPHATE 8 MG: 4 INJECTION, SOLUTION INTRAMUSCULAR; INTRAVENOUS at 04:01

## 2019-01-03 NOTE — PROGRESS NOTES
Marvin CARBAJAL Oswaldo Cross. presents with moderate upper respiratory congestion,rhinnorhea,moderate cough past 2-3 days. OTC help slightly. Denies nausea,vomiting,diarrhea or significant fever.    Past Medical History:   Diagnosis Date    Cerebrovascular disease 3/15/2013    LUGO (dyspnea on exertion)     History of colon polyps     Adenomatous polyp of colon (D12.6)    History of nephrolithiasis     History of seasonal allergies     Hyperlipidemia LDL goal < 70     Hypertension     PVD (peripheral vascular disease)     Skin cancer     Sleep apnea     compliant with CPAP    TIA (transient ischemic attack)      Past Surgical History:   Procedure Laterality Date    COLONOSCOPY N/A 6/11/2015    [facility]O    CYST REMOVAL      HERNIA REPAIR      umbilical hernia repair    [place] COLONOSCOPY,BIOPSY  4/17/2012    repeat colonoscopy 2015    RELEASE-CARPAL TUNNEL Right 5/5/2016    [facility]    rhino fyma N/A     [facility]      SPINE BIOPSY      Cyst Removed    TONSILLECTOMY      UVULOPALATOPHARYNGOPLASTY      VASCULAR SURGERY Bilateral 07/2018    Venogram Bilateral 7/10/2018    Performed [facility]ineda [facility] at [facility] CATH     Review of patient's allergies indicates:   Allergen Reactions    No known drug allergies      Current Outpatient Medications on File Prior to Visit   Medication Sig Dispense Refill    amLODIPine (NORVASC) 5 MG tablet Take 1 tablet (5 mg total) by mouth once daily. 90 tablet 3    aspirin (ECOTRIN) 81 MG EC tablet Take 81 mg by mouth once daily.      atorvastatin (LIPITOR) 40 MG tablet Take 1 tablet (40 mg total) by mouth once daily. 90 tablet 3    gabapentin (NEURONTIN) 300 MG capsule Take 300 mg by mouth 2 (two) times daily.      irbesartan (AVAPRO) 150 MG tablet Take 150 mg by mouth once daily.      losartan (COZAAR) 50 MG tablet Take 50 mg by mouth once daily.      multivitamin-minerals-lutein (CENTRUM  SILVER) Tab Take 1 tablet by mouth once daily. Every day      omega-3 acid ethyl esters (LOVAZA) 1 gram capsule Take 2 capsules (2 g total) by mouth 2 (two) times daily. 360 capsule 3    torsemide (DEMADEX) 10 MG Tab Take 10 mg by mouth once daily.      [DISCONTINUED] clopidogrel (PLAVIX) 75 mg tablet Take 75 mg by mouth once daily.       No current facility-administered medications on file prior to visit.      Social History     Socioeconomic History    Marital status:      Spouse name: Monika    Number of children: 2    Years of education: Not on file    Highest education level: Not on file   Social Needs    Financial resource strain: Not on file    Food insecurity - worry: Not on file    Food insecurity - inability: Not on file    Transportation needs - medical: Not on file    Transportation needs - non-medical: Not on file   Occupational History    Occupation: retired     Employer:  [employer]   Tobacco Use    Smoking status: Former Smoker     Last attempt to quit: 1982     Years since quittin.6    Smokeless tobacco: Former User     Quit date: 1982   Substance and Sexual Activity    Alcohol use: No    Drug use: No    Sexual activity: Yes     Partners: Female   Other Topics Concern    Not on file   Social History Narrative    Not on file     Family History   Problem Relation Age of Onset    Heart disease Mother     Cancer Mother         cancer    Cancer Father         Lung    Hypertension Father     Stroke Father     Cancer Maternal Uncle         Bone    Cancer Maternal Uncle         Throat    Stroke Maternal Uncle     Thyroid disease Sister          ROS:  SKIN: No rashes, itching or changes in color or texture of skin.  EYES: Visual acuity fine. No photophobia, ocular pain or diplopia.EARS: Denies ear pain, discharge or vertigo.NOSE: No loss of smell, no epistaxis some postnasal drip.MOUTH & THROAT: No hoarseness or change in voice. No excessive gum  bleeding.CHEST: Denies LUGO, cyanosis, wheezing  CARDIOVASCULAR: Denies chest pain, PND, orthopnea or reduced exercise tolerance.  ABDOMEN:  No weight loss.No abdominal pain, no hematemesis or blood in stool.  URINARY: No flank pain, dysuria or hematuria.  PERIPHERAL VASCULAR: No claudication or cyanosis.  MUSCULOSKELETAL: Negative   NEUROLOGIC: No history of seizures, paralysis, alteration of gait or coordination.    PE: Vital signs as noted  Heent:Normocephalic with no recent cranial trauma,PERRLA,EOMI,conjunctiva clear,fundi reveal no hemmorhage exudate or papilledema.Otic canals clear, tympanic membranes slightly dull bilaterally.Nasal mucosa slightly red and edematous.Posterior pharynx slightly red but without exudate.  Neck:Supple with minimal anterior cervical adenopathy.  Chest:Clear bilateral breath sounds with mild scattered ronchi  Heart:Regular rhthym without murmer  Abdomen:Soft, non tender,no masses, no hepatosplenomegalyExtremeties and Neurologic:Grossly within normal limits  Impression: Upper Respiratory Infection. 465.9  Plan:   Dexa 8  Montelukast

## 2019-02-07 ENCOUNTER — OFFICE VISIT (OUTPATIENT)
Dept: FAMILY MEDICINE | Facility: CLINIC | Age: 69
End: 2019-02-07
Payer: MEDICARE

## 2019-02-07 VITALS
WEIGHT: 304 LBS | BODY MASS INDEX: 46.07 KG/M2 | TEMPERATURE: 99 F | SYSTOLIC BLOOD PRESSURE: 116 MMHG | HEIGHT: 68 IN | DIASTOLIC BLOOD PRESSURE: 60 MMHG | HEART RATE: 88 BPM

## 2019-02-07 DIAGNOSIS — R07.9 CHEST PAIN, UNSPECIFIED TYPE: Primary | ICD-10-CM

## 2019-02-07 PROCEDURE — 99999 PR PBB SHADOW E&M-EST. PATIENT-LVL IV: CPT | Mod: PBBFAC,,, | Performed by: NURSE PRACTITIONER

## 2019-02-07 PROCEDURE — 99213 PR OFFICE/OUTPT VISIT, EST, LEVL III, 20-29 MIN: ICD-10-PCS | Mod: S$PBB,,, | Performed by: NURSE PRACTITIONER

## 2019-02-07 PROCEDURE — 99999 PR PBB SHADOW E&M-EST. PATIENT-LVL IV: ICD-10-PCS | Mod: PBBFAC,,, | Performed by: NURSE PRACTITIONER

## 2019-02-07 PROCEDURE — 99213 OFFICE O/P EST LOW 20 MIN: CPT | Mod: S$PBB,,, | Performed by: NURSE PRACTITIONER

## 2019-02-07 PROCEDURE — 93010 ELECTROCARDIOGRAM REPORT: CPT | Mod: ,,, | Performed by: INTERNAL MEDICINE

## 2019-02-07 PROCEDURE — 99214 OFFICE O/P EST MOD 30 MIN: CPT | Mod: PBBFAC,PO | Performed by: NURSE PRACTITIONER

## 2019-02-07 PROCEDURE — 93010 EKG 12-LEAD: ICD-10-PCS | Mod: ,,, | Performed by: INTERNAL MEDICINE

## 2019-02-07 PROCEDURE — 93005 ELECTROCARDIOGRAM TRACING: CPT | Mod: PBBFAC,PO | Performed by: NURSE PRACTITIONER

## 2019-02-07 NOTE — PROGRESS NOTES
"Subjective:       Patient ID: Marvin Chacon Jr. is a 68 y.o. male.    Chief Complaint: Chest Pain    Chest Pain    This is a new problem. The current episode started yesterday. The onset quality is gradual. The problem occurs rarely. The problem has been resolved. The pain is present in the substernal region and lateral region. The pain is mild. The quality of the pain is described as tightness (Pt described as "felt like claws pulling across the side of my chest. It started on one side then went to the other side."). The pain does not radiate. Pertinent negatives include no abdominal pain, back pain, claudication, cough, diaphoresis, dizziness, exertional chest pressure, fever, headaches, hemoptysis, irregular heartbeat, leg pain, lower extremity edema, malaise/fatigue, nausea, near-syncope, numbness, orthopnea, palpitations, PND, shortness of breath, sputum production, syncope, vomiting or weakness. 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 He has tried nothing for the symptoms. The treatment provided significant relief. Risk factors include obesity and male gender.   His past medical history is significant for hyperlipidemia, hypertension, PVD, strokes and TIA.   Pertinent negatives for past medical history include no aneurysm, no anxiety/panic attacks, no aortic aneurysm, no aortic dissection, no arrhythmia, no bicuspid aortic valve, no CAD, no cancer, no congenital heart disease, no connective tissue disease, no COPD, no CHF, no diabetes, no DVT, no hyperhomocysteinemia, no Kawasaki disease, no Marfan's syndrome, no MI, no mitral valve prolapse, no pacemaker, no PE, no recent injury, no rheumatic fever, no seizures, no sickle cell disease, no sleep apnea, no spontaneous pneumothorax, no stimulant use, no thyroid problem, Limon syndrome and no valve disorder.   His family medical history is significant for heart disease, hypertension and stroke.   Pertinent negatives for " "family medical history include: no aortic dissection, no CAD, no connective tissue disease, no diabetes, no hyperlipidemia, no Marfan's syndrome, no early MI, no PE, no PVD, no sickle cell disease, no sudden death and no TIA. Prior diagnostic workup includes echocardiogram (Pt declined stress test, labs, CXR; states, "I'm seeing my cardiologist on Tuesday. i called and talked with the nurse about everything and they're going to do everything there."education provided r/t possible MI, CVA, death; verbalized understanding. ).     Past Medical History:   Diagnosis Date    Cerebrovascular disease 3/15/2013    LUGO (dyspnea on exertion)     History of colon polyps     Adenomatous polyp of colon (D12.6)    History of nephrolithiasis     History of seasonal allergies     Hyperlipidemia LDL goal < 70     Hypertension     PVD (peripheral vascular disease)     Skin cancer     Sleep apnea     compliant with CPAP    TIA (transient ischemic attack)      Social History     Socioeconomic History    Marital status:      Spouse name: Monika    Number of children: 2    Years of education: Not on file    Highest education level: Not on file   Social Needs    Financial resource strain: Not on file    Food insecurity - worry: Not on file    Food insecurity - inability: Not on file    Transportation needs - medical: Not on file    Transportation needs - non-medical: Not on file   Occupational History    Occupation: retired     Employer:  [employer]   Tobacco Use    Smoking status: Former Smoker     Last attempt to quit: 1982     Years since quittin.7    Smokeless tobacco: Former User     Quit date: 1982   Substance and Sexual Activity    Alcohol use: No    Drug use: No    Sexual activity: Yes     Partners: Female   Other Topics Concern    Not on file   Social History Narrative    Not on file     Past Surgical History:   Procedure Laterality Date    COLONOSCOPY N/A 2015    " [facility]f [facility] at [facility]    CYST REMOVAL      HERNIA REPAIR      umbilical hernia repair    [place] COLONOSCOPY,BIOPSY  4/17/2012    repeat colonoscopy 2015    RELEASE-CARPAL TUNNEL Right 5/5/2016    [facility] at [facility]    rhino fyma N/A     [facility]      SPINE BIOPSY      Cyst Removed    TONSILLECTOMY      UVULOPALATOPHARYNGOPLASTY      VASCULAR SURGERY Bilateral 07/2018    Venogram Bilateral 7/10/2018    Performed [facility] at [facility] CATH       Review of Systems   Constitutional: Negative.  Negative for diaphoresis, fever and malaise/fatigue.   HENT: Negative.    Eyes: Negative.    Respiratory: Negative.  Negative for cough, hemoptysis, sputum production and shortness of breath.    Cardiovascular: Positive for chest pain. Negative for palpitations, orthopnea, claudication, syncope, PND and near-syncope.   Gastrointestinal: Negative.  Negative for abdominal pain, nausea and vomiting.   Endocrine: Negative.    Genitourinary: Negative.    Musculoskeletal: Negative.  Negative for back pain.   Skin: Negative.    Allergic/Immunologic: Negative.    Neurological: Negative.  Negative for dizziness, seizures, weakness, numbness and headaches.   Psychiatric/Behavioral: Negative.        Objective:      Physical Exam   Constitutional: He is oriented to person, place, and time. He appears well-developed and well-nourished.   HENT:   Head: Normocephalic.   Right Ear: External ear normal.   Left Ear: External ear normal.   Nose: Nose normal.   Mouth/Throat: Oropharynx is clear and moist.   Eyes: Conjunctivae are normal. Pupils are equal, round, and reactive to light.   Neck: Normal range of motion. Neck supple.   Cardiovascular: Normal rate, regular rhythm and normal heart sounds.   Pulmonary/Chest: Effort normal and breath sounds normal.   Abdominal: Soft. Bowel sounds are normal.   Musculoskeletal: Normal range of motion.   Neurological: He is alert and  oriented to person, place, and time.   Skin: Skin is warm and dry. Capillary refill takes 2 to 3 seconds.   Psychiatric: He has a normal mood and affect. His behavior is normal. Judgment and thought content normal.   Nursing note and vitals reviewed.      Assessment:       1. Chest pain, unspecified type        Plan:           Marvin was seen today for chest pain.    Diagnoses and all orders for this visit:    Chest pain, unspecified type  -     IN OFFICE EKG 12-LEAD (to Muse)        Pt declined CXR, lab, stress test, cardiology referral        Report to ER immediately if symptoms worsen        F/U with cardiologist

## 2019-02-11 ENCOUNTER — TELEPHONE (OUTPATIENT)
Dept: PULMONOLOGY | Facility: CLINIC | Age: 69
End: 2019-02-11

## 2019-02-11 NOTE — TELEPHONE ENCOUNTER
Appointment rescheduled for 04/22/19,  patient notified and agreed with date and time, reminder letter mailed

## 2019-03-26 ENCOUNTER — TELEPHONE (OUTPATIENT)
Dept: FAMILY MEDICINE | Facility: CLINIC | Age: 69
End: 2019-03-26

## 2019-03-26 NOTE — TELEPHONE ENCOUNTER
----- Message from Hazel Caceres sent at 3/26/2019  9:09 AM CDT -----  Contact: [phone]  ..Type:  Sooner Apoointment Request    Caller is requesting a sooner appointment.  Caller declined first available appointment listed below.  Caller will not accept being placed on the waitlist and is requesting a message be sent to doctor.  Name of Caller:Marvin Chacon   When is the first available appointment?5/8  Symptoms:knee pain  Would the patient rather a call back or a response via [facility]? Call back   Best Call Back Number [phone]  Additional Information: will be in town 4/1-4/10 and 4/15-4/19

## 2019-03-26 NOTE — TELEPHONE ENCOUNTER
----- Message from Scarlet Combs sent at 3/26/2019  9:48 AM CDT -----  Contact: pt   Type:  Patient Returning Call    Who Called:pt   Who Left Message for Patient: Dr Brady office  Does the patient know what this is regarding?: not sure   Would the patient rather a call back or a response via [facility]? Call back   Best Call Back Number: [phone]  Additional Information:

## 2019-04-03 ENCOUNTER — ANCILLARY ORDERS (OUTPATIENT)
Dept: FAMILY MEDICINE | Facility: CLINIC | Age: 69
End: 2019-04-03

## 2019-04-03 ENCOUNTER — PATIENT MESSAGE (OUTPATIENT)
Dept: ENDOSCOPY | Facility: HOSPITAL | Age: 69
End: 2019-04-03

## 2019-04-03 ENCOUNTER — HOSPITAL ENCOUNTER (OUTPATIENT)
Dept: RADIOLOGY | Facility: HOSPITAL | Age: 69
Discharge: HOME | End: 2019-04-03
Attending: FAMILY MEDICINE
Payer: MEDICARE

## 2019-04-03 ENCOUNTER — OFFICE VISIT (OUTPATIENT)
Dept: FAMILY MEDICINE | Facility: CLINIC | Age: 69
End: 2019-04-03
Payer: MEDICARE

## 2019-04-03 VITALS
HEIGHT: 68 IN | HEART RATE: 64 BPM | WEIGHT: 307 LBS | TEMPERATURE: 99 F | SYSTOLIC BLOOD PRESSURE: 128 MMHG | DIASTOLIC BLOOD PRESSURE: 67 MMHG | BODY MASS INDEX: 46.53 KG/M2

## 2019-04-03 DIAGNOSIS — M17.12 PRIMARY OSTEOARTHRITIS OF LEFT KNEE: Primary | ICD-10-CM

## 2019-04-03 DIAGNOSIS — Z86.010 HISTORY OF ADENOMATOUS POLYP OF COLON: ICD-10-CM

## 2019-04-03 DIAGNOSIS — Z80.0 FAMILY HISTORY OF COLON CANCER: ICD-10-CM

## 2019-04-03 DIAGNOSIS — E66.01 OBESITY, CLASS III, BMI 40-49.9 (MORBID OBESITY): ICD-10-CM

## 2019-04-03 DIAGNOSIS — M25.562 ACUTE PAIN OF LEFT KNEE: Primary | ICD-10-CM

## 2019-04-03 DIAGNOSIS — M25.562 ACUTE PAIN OF LEFT KNEE: ICD-10-CM

## 2019-04-03 DIAGNOSIS — Z12.11 COLON CANCER SCREENING: Primary | ICD-10-CM

## 2019-04-03 PROBLEM — I83.019 VENOUS ULCER OF RIGHT LEG: Status: RESOLVED | Noted: 2018-07-10 | Resolved: 2019-04-03

## 2019-04-03 PROBLEM — L97.919 VENOUS ULCER OF RIGHT LEG: Status: RESOLVED | Noted: 2018-07-10 | Resolved: 2019-04-03

## 2019-04-03 PROCEDURE — 99214 OFFICE O/P EST MOD 30 MIN: CPT | Mod: PBBFAC,25,PO | Performed by: FAMILY MEDICINE

## 2019-04-03 PROCEDURE — 73560 XR KNEE ORTHO LEFT: ICD-10-PCS | Mod: 26,59,LT, | Performed by: RADIOLOGY

## 2019-04-03 PROCEDURE — 99213 PR OFFICE/OUTPT VISIT, EST, LEVL III, 20-29 MIN: ICD-10-PCS | Mod: S$PBB,,, | Performed by: FAMILY MEDICINE

## 2019-04-03 PROCEDURE — 99999 PR PBB SHADOW E&M-EST. PATIENT-LVL IV: ICD-10-PCS | Mod: PBBFAC,,, | Performed by: FAMILY MEDICINE

## 2019-04-03 PROCEDURE — 73560 X-RAY EXAM OF KNEE 1 OR 2: CPT | Mod: 59,TC,PO,RT

## 2019-04-03 PROCEDURE — 99999 PR PBB SHADOW E&M-EST. PATIENT-LVL IV: CPT | Mod: PBBFAC,,, | Performed by: FAMILY MEDICINE

## 2019-04-03 PROCEDURE — 99213 OFFICE O/P EST LOW 20 MIN: CPT | Mod: S$PBB,,, | Performed by: FAMILY MEDICINE

## 2019-04-03 PROCEDURE — 73560 X-RAY EXAM OF KNEE 1 OR 2: CPT | Mod: 26,59,LT, | Performed by: RADIOLOGY

## 2019-04-03 PROCEDURE — 73562 XR KNEE ORTHO LEFT: ICD-10-PCS | Mod: 26,LT,, | Performed by: RADIOLOGY

## 2019-04-03 PROCEDURE — 73562 X-RAY EXAM OF KNEE 3: CPT | Mod: TC,PO,LT

## 2019-04-03 PROCEDURE — G0009 ADMIN PNEUMOCOCCAL VACCINE: HCPCS | Mod: PBBFAC,PO

## 2019-04-03 PROCEDURE — 73562 X-RAY EXAM OF KNEE 3: CPT | Mod: 26,LT,, | Performed by: RADIOLOGY

## 2019-04-03 RX ORDER — METOPROLOL TARTRATE 25 MG/1
25 TABLET, FILM COATED ORAL 2 TIMES DAILY
COMMUNITY
End: 2020-10-26

## 2019-04-03 RX ORDER — PROMETHAZINE HYDROCHLORIDE 25 MG/1
25 TABLET ORAL EVERY 6 HOURS PRN
Qty: 6 TABLET | Refills: 0 | Status: SHIPPED | OUTPATIENT
Start: 2019-04-03 | End: 2020-10-26

## 2019-04-03 RX ORDER — SODIUM, POTASSIUM,MAG SULFATES 17.5-3.13G
SOLUTION, RECONSTITUTED, ORAL ORAL
Qty: 354 ML | Refills: 0 | Status: ON HOLD | OUTPATIENT
Start: 2019-04-03 | End: 2019-09-05 | Stop reason: HOSPADM

## 2019-04-03 RX ORDER — MELOXICAM 15 MG/1
15 TABLET ORAL DAILY
Qty: 30 TABLET | Refills: 0 | Status: SHIPPED | OUTPATIENT
Start: 2019-04-03 | End: 2019-04-22 | Stop reason: SDUPTHER

## 2019-04-03 NOTE — PROGRESS NOTES
Subjective:      Patient ID: Marvin Chacon Jr. is a 68 y.o. male.    Chief Complaint: Knee Pain    HPI  He has had pain in the left knee for the last few months.  He has hyperextended it a few times he states.  He has had xrays in the past. He has not had medicine for this but has used a brace.  He has not had numbness or weakness noted.    Problem List Items Addressed This Visit     Obesity, Class III, BMI 40-49.9 (morbid obesity)    Overview     The patient presents with obesity.  Denies bulimia, amenorrhea, cold intolerance, edema, hip pain, hirsutism, knee pain, polydipsia, polyuria, thirst and weakness.  The patient does not perform regular exercise.  Previous treatments for obesity :self-directed dieting without success.  The patient and I discussed the importance of exercise.    Wt Readings from Last 4 Encounters:   04/03/19 (!) 139.3 kg (307 lb)   02/07/19 (!) 137.9 kg (304 lb)   01/03/19 (!) 138.3 kg (304 lb 12.8 oz)   07/26/18 (!) 137.1 kg (302 lb 3.2 oz)                        Other Visit Diagnoses     Acute pain of left knee    -  Primary    Relevant Orders    X-Ray Knee 1 or 2 View Left          Review of Systems   Constitutional: Positive for activity change. Negative for unexpected weight change.   HENT: Negative for hearing loss, rhinorrhea and trouble swallowing.    Eyes: Negative for discharge and visual disturbance.   Respiratory: Negative for chest tightness and wheezing.    Cardiovascular: Negative for chest pain and palpitations.   Gastrointestinal: Negative for blood in stool, constipation, diarrhea and vomiting.   Endocrine: Negative for polydipsia and polyuria.   Genitourinary: Negative for difficulty urinating, hematuria and urgency.   Musculoskeletal: Positive for arthralgias. Negative for joint swelling and neck pain.   Neurological: Positive for weakness. Negative for headaches.   Psychiatric/Behavioral: Negative for confusion and dysphoric mood.       Objective:   /67   Pulse  "64   Temp 98.5 °F (36.9 °C)   Ht 5' 8" (1.727 m)   Wt (!) 139.3 kg (307 lb)   BMI 46.68 kg/m²     Physical Exam   Musculoskeletal:        Left knee: He exhibits normal range of motion, no swelling, no effusion, no ecchymosis, no deformity, no laceration, no erythema, normal alignment, no LCL laxity, normal meniscus and no MCL laxity. Tenderness found. Medial joint line, lateral joint line and patellar tendon tenderness noted. No MCL and no LCL tenderness noted.       Assessment:     1. Acute pain of left knee    2. Obesity, Class III, BMI 40-49.9 (morbid obesity)        Plan:   Marvin was seen today for knee pain.    Diagnoses and all orders for this visit:    Acute pain of left knee  -     X-Ray Knee 1 or 2 View Left; Future    Obesity, Class III, BMI 40-49.9 (morbid obesity)    Other orders  -     meloxicam (MOBIC) 15 MG tablet; Take 1 tablet (15 mg total) by mouth once daily.  -     Pneumococcal Polysaccharide Vaccine (23 Valent) (SQ/IM)    consider PT in [place] if needed.    "

## 2019-04-03 NOTE — TELEPHONE ENCOUNTER
I have signed for the following orders AND/OR meds.  Please call the patient and ask the patient to schedule the testing AND/OR inform about any medications that were sent.      Orders Placed This Encounter   Procedures    Case request GI: COLONOSCOPY     Order Specific Question:   Pre-op Diagnosis     Answer:   Colon cancer screening [[id number]]     Order Specific Question:   CPT Code:     Answer:   OR COLORECTAL CANCER SCREEN RESULTS DOCUMENT/REVIEW [3017F]     Order Specific Question:   Case Referring Provider     Answer:   DENA JACKSON [[id number]]     Order Specific Question:   CPT Code:     Answer:   OR COLORECTAL SCRN; HI RISK IND []     Order Specific Question:   Medical Necessity:     Answer:   Medically Non-Urgent [100]       Medications Ordered This Encounter   Medications    promethazine (PHENERGAN) 25 MG tablet     Sig: Take 1 tablet (25 mg total) by mouth every 6 (six) hours as needed for Nausea.     Dispense:  6 tablet     Refill:  0    sodium,potassium,mag sulfates (SUPREP BOWEL PREP KIT) 17.5-3.13-1.6 gram SolR     Sig: Take as instructed on prep sheet     Dispense:  354 mL     Refill:  0

## 2019-04-22 RX ORDER — MELOXICAM 15 MG/1
15 TABLET ORAL DAILY
Qty: 30 TABLET | Refills: 0 | Status: SHIPPED | OUTPATIENT
Start: 2019-04-22 | End: 2019-04-30 | Stop reason: SDUPTHER

## 2019-04-30 DIAGNOSIS — I10 ESSENTIAL HYPERTENSION: ICD-10-CM

## 2019-04-30 RX ORDER — ATORVASTATIN CALCIUM 40 MG/1
40 TABLET, FILM COATED ORAL DAILY
Qty: 90 TABLET | Refills: 3 | Status: SHIPPED | OUTPATIENT
Start: 2019-04-30 | End: 2020-02-11 | Stop reason: SDUPTHER

## 2019-04-30 RX ORDER — MELOXICAM 15 MG/1
15 TABLET ORAL DAILY
Qty: 90 TABLET | Refills: 0 | Status: SHIPPED | OUTPATIENT
Start: 2019-04-30 | End: 2019-07-01 | Stop reason: SDUPTHER

## 2019-05-20 ENCOUNTER — PATIENT MESSAGE (OUTPATIENT)
Dept: FAMILY MEDICINE | Facility: CLINIC | Age: 69
End: 2019-05-20

## 2019-05-20 ENCOUNTER — TELEPHONE (OUTPATIENT)
Dept: ENDOSCOPY | Facility: HOSPITAL | Age: 69
End: 2019-05-20

## 2019-05-20 NOTE — TELEPHONE ENCOUNTER
Endoscopy Scheduling Questionnaire:    1. Have you been admitted overnight to the hospital in the past 3 months? no  2. Do you get chest pain and SOB while walking up a flight of stairs? yes  3. Have you had a cardiac stent placed in the past 12 months? no  4. Have you had a stroke or heart attack in the past 6 months? no  5. Have you had any chest pain in the past 3 months? no      If so, have you been evaluated by your PCP or Cardiologist? no  6. Do you take medication for weight loss? no  7. Have you been diagnosed with Diverticulitis within the past 3 months? no  8. Are you having any GI symptoms that you feel need to be evaluated prior to your procedure? no  9. Are you on dialysis? no  10. Are you diabetic? no  11. Do you have any other health issues that you feel might limit your ability to safely have the procedure and/or sedation? no  12. Is the patient over 81 yo? no        If so, has the patient been seen by their PCP or GI in the last 3 months? N/A       -I have reviewed the last colonoscopy for recommendations regarding surveillance and bowel prep  Yes  -I have reviewed the patient's medications and allergies. He is not on high risk medication, A clearance request NA  -I have verified the pharmacy information. The prep being used is Suprep. The patient's prep instructions were sent via [facility] patient portal..    Date Endoscopy Scheduled: Date: 7/16/19

## 2019-05-23 ENCOUNTER — OFFICE VISIT (OUTPATIENT)
Dept: PULMONOLOGY | Facility: CLINIC | Age: 69
End: 2019-05-23
Payer: MEDICARE

## 2019-05-23 VITALS
WEIGHT: 305.31 LBS | SYSTOLIC BLOOD PRESSURE: 124 MMHG | HEIGHT: 68 IN | HEART RATE: 64 BPM | RESPIRATION RATE: 18 BRPM | OXYGEN SATURATION: 95 % | BODY MASS INDEX: 46.27 KG/M2 | DIASTOLIC BLOOD PRESSURE: 72 MMHG

## 2019-05-23 DIAGNOSIS — E66.01 OBESITY, CLASS III, BMI 40-49.9 (MORBID OBESITY): ICD-10-CM

## 2019-05-23 DIAGNOSIS — G47.33 OBSTRUCTIVE SLEEP APNEA: Primary | ICD-10-CM

## 2019-05-23 PROCEDURE — 99999 PR PBB SHADOW E&M-EST. PATIENT-LVL III: CPT | Mod: PBBFAC,,, | Performed by: NURSE PRACTITIONER

## 2019-05-23 PROCEDURE — 99999 PR PBB SHADOW E&M-EST. PATIENT-LVL III: ICD-10-PCS | Mod: PBBFAC,,, | Performed by: NURSE PRACTITIONER

## 2019-05-23 PROCEDURE — 99213 OFFICE O/P EST LOW 20 MIN: CPT | Mod: PBBFAC | Performed by: NURSE PRACTITIONER

## 2019-05-23 PROCEDURE — 99213 OFFICE O/P EST LOW 20 MIN: CPT | Mod: S$PBB,,, | Performed by: NURSE PRACTITIONER

## 2019-05-23 PROCEDURE — 99213 PR OFFICE/OUTPT VISIT, EST, LEVL III, 20-29 MIN: ICD-10-PCS | Mod: S$PBB,,, | Performed by: NURSE PRACTITIONER

## 2019-05-23 NOTE — PROGRESS NOTES
"Subjective:      Patient ID: Marvin Chacon Jr. is a 68 y.o. male.    Chief Complaint: Sleep Apnea    HPI  Patient presents to the office today for evaluation of BiPAP setting use.  She patient benefits from use in wears nightly.  Pressure setting 17/14 cm water pressure.  His diagnostic  events per hour.  He understands need to lose weight.    Patient Active Problem List:     Phlebitis and thrombophlebitis of unspecified site     History of adenomatous polyp of colon     Hypertension     CVA (cerebral infarction)     Hyperlipidemia     Cerebrovascular disease     Hypogonadism male     Erectile disorder due to medical condition in male patient     History of colon polyps     Colon polyp     Family history of colon cancer     Male hypogonadism     Obstructive sleep apnea     Cubital tunnel syndrome     Bilateral carpal tunnel syndrome     Carpal tunnel syndrome     Chronic edema     Rhinophyma     Behaviorally induced insufficient sleep syndrome     Periodic limb movement disorder (PLMD)     /72   Pulse 64   Resp 18   Ht 5' 8" (1.727 m)   Wt (!) 138.5 kg (305 lb 5.4 oz)   SpO2 95%   BMI 46.43 kg/m²   Body mass index is 46.43 kg/m².    Review of Systems   Constitutional: Negative.    HENT: Negative.    Respiratory: Negative.    Cardiovascular: Negative.    Musculoskeletal: Positive for arthralgias.   Gastrointestinal: Negative.    Neurological: Negative.    Psychiatric/Behavioral: Negative.      Objective:      Physical Exam   Constitutional: He is oriented to person, place, and time. He appears well-developed and well-nourished.   Obese   HENT:   Head: Normocephalic and atraumatic.   Mallampati Score: IV     Neck: Normal range of motion. Neck supple.   Neck circumference:21 inches    Cardiovascular: Normal rate and regular rhythm.   Pulmonary/Chest: Effort normal and breath sounds normal.   Abdominal: Soft.   Musculoskeletal: He exhibits no edema.   Neurological: He is alert and oriented to " person, place, and time.   Skin: Skin is warm and dry.   Psychiatric: He has a normal mood and affect.     Personal Diagnostic Review  BiPAP download shows patient wears on average 7 hrs and 57 minutes. Greater than 4 hrs 100 % of the time. AHI 4.4      Assessment:       1. Obstructive sleep apnea    2. Obesity, Class III, BMI 40-49.9 (morbid obesity)        Outpatient Encounter Medications as of 5/23/2019   Medication Sig Dispense Refill    aspirin (ECOTRIN) 81 MG EC tablet Take 81 mg by mouth once daily.      atorvastatin (LIPITOR) 40 MG tablet Take 1 tablet (40 mg total) by mouth once daily. 90 tablet 3    gabapentin (NEURONTIN) 300 MG capsule Take 300 mg by mouth 2 (two) times daily.      irbesartan (AVAPRO) 150 MG tablet Take 150 mg by mouth once daily.      meloxicam (MOBIC) 15 MG tablet Take 1 tablet (15 mg total) by mouth once daily. 90 tablet 0    metoprolol tartrate (LOPRESSOR) 25 MG tablet Take 25 mg by mouth 2 (two) times daily.      multivitamin-minerals-lutein (CENTRUM SILVER) Tab Take 1 tablet by mouth once daily. Every day      sodium,potassium,mag sulfates (SUPREP BOWEL PREP KIT) 17.5-3.13-1.6 gram SolR Take as instructed on prep sheet 354 mL 0    omega-3 acid ethyl esters (LOVAZA) 1 gram capsule Take 2 capsules (2 g total) by mouth 2 (two) times daily. 360 capsule 3    promethazine (PHENERGAN) 25 MG tablet Take 1 tablet (25 mg total) by mouth every 6 (six) hours as needed for Nausea. 6 tablet 0     No facility-administered encounter medications on file as of 5/23/2019.      Orders Placed This Encounter   Procedures    CPAP/BIPAP SUPPLIES     Order Specific Question:   Type of mask:     Answer:   Nasal     Order Specific Question:   Headgear?     Answer:   Yes     Order Specific Question:   Tubing?     Answer:   Yes     Order Specific Question:   Humidifier chamber?     Answer:   Yes     Order Specific Question:   Chin strap?     Answer:   Yes     Order Specific Question:   Filters?      Answer:   Yes     Order Specific Question:   Cushions?     Answer:   Yes     Order Specific Question:   Length of need (1-99 months):     Answer:   99     Plan:      Doing well on PAP settings. Patient is compliant. Follow up in 12 months with PAP data download or call earlier if any problems.    Problem List Items Addressed This Visit        Endocrine    Obesity, Class III, BMI 40-49.9 (morbid obesity)    Overview     The patient presents with obesity.  Denies bulimia, amenorrhea, cold intolerance, edema, hip pain, hirsutism, knee pain, polydipsia, polyuria, thirst and weakness.  The patient does not perform regular exercise.  Previous treatments for obesity :self-directed dieting without success.  The patient and I discussed the importance of exercise.    Wt Readings from Last 4 Encounters:   04/03/19 (!) 139.3 kg (307 lb)   02/07/19 (!) 137.9 kg (304 lb)   01/03/19 (!) 138.3 kg (304 lb 12.8 oz)   07/26/18 (!) 137.1 kg (302 lb 3.2 oz)                      Current Assessment & Plan     No weight loss since last year. Weight loss and exercise to improve overall health.              Other    Obstructive sleep apnea - Primary    Overview     /hr. BIPAP 17/14         Relevant Orders    CPAP/BIPAP SUPPLIES

## 2019-07-01 RX ORDER — MELOXICAM 15 MG/1
TABLET ORAL
Qty: 90 TABLET | Refills: 0 | Status: SHIPPED | OUTPATIENT
Start: 2019-07-01 | End: 2019-09-10 | Stop reason: SDUPTHER

## 2019-07-09 ENCOUNTER — PATIENT MESSAGE (OUTPATIENT)
Dept: DERMATOLOGY | Facility: CLINIC | Age: 69
End: 2019-07-09

## 2019-07-09 ENCOUNTER — PATIENT MESSAGE (OUTPATIENT)
Dept: ENDOSCOPY | Facility: HOSPITAL | Age: 69
End: 2019-07-09

## 2019-07-09 ENCOUNTER — TELEPHONE (OUTPATIENT)
Dept: ENDOSCOPY | Facility: HOSPITAL | Age: 69
End: 2019-07-09

## 2019-07-09 NOTE — TELEPHONE ENCOUNTER
----- Message from Mounika Stringer LPN sent at 7/9/2019 10:58 AM CDT -----  Regarding: FW:Reminder for Upcoming Procedure  Contact: [phone]      ----- Message -----  From: Marvin Chacon Jr.  Sent: 7/9/2019  10:57 AM  To: Caitlyn QUINN. Staff  Subject: RE:Reminder for Upcoming Procedure               Zhao Thorpe to say the 16th of July will not work for me. I have to cancel my colonoscopy. for this date.  Please have your office yariel me at [phone] to reschedule. Thank you. for your time.    Israel Chacon  ----- Message -----  From: Stef Brady MD  Sent: 7/9/2019  8:30 AM CDT  To: Marvin Chacon Jr.  Subject: Reminder for Upcoming Procedure  [facility] [address] [place] [id number]    07/09/2019      Dear Marvin,      This is a reminder for your upcoming procedure with Stef Brady MD on 7/16/2019. We will contact you again before the day of your procedure with your scheduled arrival time unless you have already received this information from your physicians office.         If you have questions or scheduling concerns, you can contact your physicians office:   Stef Brady MD  Phone Number: [phone]      Sincerely,     [facility] [address] [place] [id number]

## 2019-07-09 NOTE — TELEPHONE ENCOUNTER
This patient contacted me to reschedule his colonoscopy.  Can you contact him and do this for him?

## 2019-07-10 ENCOUNTER — PATIENT OUTREACH (OUTPATIENT)
Dept: ADMINISTRATIVE | Facility: HOSPITAL | Age: 69
End: 2019-07-10

## 2019-07-24 ENCOUNTER — HOSPITAL ENCOUNTER (OUTPATIENT)
Dept: RADIOLOGY | Facility: HOSPITAL | Age: 69
Discharge: HOME | End: 2019-07-24
Attending: FAMILY MEDICINE
Payer: MEDICARE

## 2019-07-24 ENCOUNTER — CLINICAL SUPPORT (OUTPATIENT)
Dept: FAMILY MEDICINE | Facility: CLINIC | Age: 69
End: 2019-07-24
Payer: MEDICARE

## 2019-07-24 ENCOUNTER — OFFICE VISIT (OUTPATIENT)
Dept: FAMILY MEDICINE | Facility: CLINIC | Age: 69
End: 2019-07-24
Payer: MEDICARE

## 2019-07-24 VITALS
TEMPERATURE: 98 F | DIASTOLIC BLOOD PRESSURE: 90 MMHG | SYSTOLIC BLOOD PRESSURE: 170 MMHG | BODY MASS INDEX: 47.74 KG/M2 | HEART RATE: 63 BPM | HEIGHT: 68 IN | WEIGHT: 315 LBS

## 2019-07-24 DIAGNOSIS — Z86.010 HISTORY OF COLON POLYPS: ICD-10-CM

## 2019-07-24 DIAGNOSIS — Z80.0 FAMILY HISTORY OF COLON CANCER: ICD-10-CM

## 2019-07-24 DIAGNOSIS — R06.00 DYSPNEA, UNSPECIFIED TYPE: ICD-10-CM

## 2019-07-24 DIAGNOSIS — I10 ESSENTIAL HYPERTENSION: ICD-10-CM

## 2019-07-24 DIAGNOSIS — R06.00 DYSPNEA, UNSPECIFIED TYPE: Primary | ICD-10-CM

## 2019-07-24 PROCEDURE — 93010 ELECTROCARDIOGRAM REPORT: CPT | Mod: S$PBB,,, | Performed by: INTERNAL MEDICINE

## 2019-07-24 PROCEDURE — 99214 PR OFFICE/OUTPT VISIT, EST, LEVL IV, 30-39 MIN: ICD-10-PCS | Mod: S$PBB,,, | Performed by: FAMILY MEDICINE

## 2019-07-24 PROCEDURE — 99999 PR PBB SHADOW E&M-EST. PATIENT-LVL III: CPT | Mod: PBBFAC,,, | Performed by: FAMILY MEDICINE

## 2019-07-24 PROCEDURE — 71046 XR CHEST PA AND LATERAL: ICD-10-PCS | Mod: 26,,, | Performed by: RADIOLOGY

## 2019-07-24 PROCEDURE — 93005 ELECTROCARDIOGRAM TRACING: CPT | Mod: PBBFAC,PO | Performed by: INTERNAL MEDICINE

## 2019-07-24 PROCEDURE — 71046 X-RAY EXAM CHEST 2 VIEWS: CPT | Mod: TC,PO

## 2019-07-24 PROCEDURE — 93010 EKG 12-LEAD: ICD-10-PCS | Mod: S$PBB,,, | Performed by: INTERNAL MEDICINE

## 2019-07-24 PROCEDURE — 99999 PR PBB SHADOW E&M-EST. PATIENT-LVL III: ICD-10-PCS | Mod: PBBFAC,,, | Performed by: FAMILY MEDICINE

## 2019-07-24 PROCEDURE — 99213 OFFICE O/P EST LOW 20 MIN: CPT | Mod: PBBFAC,PO | Performed by: FAMILY MEDICINE

## 2019-07-24 PROCEDURE — 99214 OFFICE O/P EST MOD 30 MIN: CPT | Mod: S$PBB,,, | Performed by: FAMILY MEDICINE

## 2019-07-24 PROCEDURE — 71046 X-RAY EXAM CHEST 2 VIEWS: CPT | Mod: 26,,, | Performed by: RADIOLOGY

## 2019-07-24 RX ORDER — HYDROCHLOROTHIAZIDE 25 MG/1
25 TABLET ORAL DAILY
Qty: 30 TABLET | Refills: 11 | Status: SHIPPED | OUTPATIENT
Start: 2019-07-24 | End: 2020-01-28

## 2019-07-24 NOTE — PROGRESS NOTES
Subjective:      Patient ID: Marvin Chacon Jr. is a 68 y.o. male.    Chief Complaint: dyspnea, history colon polyps and fam history colon cancer.  HPI  Dyspnea: Patient complains of shortness of breath with one block walking.  Symptoms include dyspnea on exertion and dyspnea when laying down. Symptoms began 4 weeks ago, unchanged since that time.  Patient denies dry cough, edema  , hemoptysis  , productive cough and tightness in chest. Associated symptoms include wheezing. Patient did have had recent travel.  He went to [place] 2 weeks ago but he was SOB prior to this.  Weight has been stable.  Appetite has been unaffected. Symptoms are exacerbated by any exercise. Symptoms are alleviated by rest.  He has noted some wheezing at times.  He has not used anything to treat this.     The patient presents with essential hypertension.  The patient is tolerating the medication well and is in excellent compliance.  It is elevated today. He has been on meloxicam.  The patient is experiencing no side effects.  Counseling was offered regarding low salt diets.  The patient has a reduced salt intake.  The patient denies chest pain, palpitations, shortness of breath, dyspnea on exertion, left or murmur neck pain, nausea, vomiting, diaphoresis, paroxysmal nocturnal dyspnea, and orthopnea.   Hypertension Medications             irbesartan (AVAPRO) 150 MG tablet Take 150 mg by mouth once daily.    metoprolol tartrate (LOPRESSOR) 25 MG tablet Take 25 mg by mouth 2 (two) times daily.           He has a history of colon polyps in the past and he is due for a colonoscopy.  He also has a family history of colon cancer in a first degree relative.    Health Maintenance Due   Topic Date Due    Colonoscopy  06/11/2018       Past Medical History:  Past Medical History:   Diagnosis Date    Cerebrovascular disease 3/15/2013    LUGO (dyspnea on exertion)     History of colon polyps     Adenomatous polyp of colon (D12.6)    History of  nephrolithiasis     History of seasonal allergies     Hyperlipidemia LDL goal < 70     Hypertension     PVD (peripheral vascular disease)     Skin cancer     Sleep apnea     compliant with CPAP    TIA (transient ischemic attack)      Past Surgical History:   Procedure Laterality Date    COLONOSCOPY N/A 6/11/2015    [facility]    CYST REMOVAL      HERNIA REPAIR      umbilical hernia repair    AR COLONOSCOPY,BIOPSY  4/17/2012    repeat colonoscopy 2015    RELEASE-CARPAL TUNNEL Right 5/5/2016    [facility]    rhino fyma N/A     [facility]      SPINE BIOPSY      Cyst Removed    TONSILLECTOMY      UVULOPALATOPHARYNGOPLASTY      VASCULAR SURGERY Bilateral 07/2018    Venogram Bilateral 7/10/2018    [facility] CATH     Review of patient's allergies indicates:   Allergen Reactions    No known drug allergies      Current Outpatient Medications on File Prior to Visit   Medication Sig Dispense Refill    aspirin (ECOTRIN) 81 MG EC tablet Take 81 mg by mouth once daily.      atorvastatin (LIPITOR) 40 MG tablet Take 1 tablet (40 mg total) by mouth once daily. 90 tablet 3    gabapentin (NEURONTIN) 300 MG capsule Take 300 mg by mouth 2 (two) times daily.      irbesartan (AVAPRO) 150 MG tablet Take 150 mg by mouth once daily.      meloxicam (MOBIC) 15 MG tablet TAKE 1 TABLET EVERY DAY 90 tablet 0    metoprolol tartrate (LOPRESSOR) 25 MG tablet Take 25 mg by mouth 2 (two) times daily.      multivitamin-minerals-lutein (CENTRUM SILVER) Tab Take 1 tablet by mouth once daily. Every day      omega-3 acid ethyl esters (LOVAZA) 1 gram capsule Take 2 capsules (2 g total) by mouth 2 (two) times daily. 360 capsule 3    promethazine (PHENERGAN) 25 MG tablet Take 1 tablet (25 mg total) by mouth every 6 (six) hours as needed for Nausea. 6 tablet 0    sodium,potassium,mag sulfates (SUPREP BOWEL PREP KIT)  17.5-3.13-1.6 gram SolR Take as instructed on prep sheet 354 mL 0     No current facility-administered medications on file prior to visit.      Social History     Socioeconomic History    Marital status:      Spouse name: Monika    Number of children: 2    Years of education: Not on file    Highest education level: Not on file   Occupational History    Occupation: retired     Employer:  [employer]   Social Needs    Financial resource strain: Not on file    Food insecurity:     Worry: Not on file     Inability: Not on file    Transportation needs:     Medical: Not on file     Non-medical: Not on file   Tobacco Use    Smoking status: Former Smoker     Packs/day: 0.50     Last attempt to quit: 1982     Years since quittin.1    Smokeless tobacco: Former User     Quit date: 1982   Substance and Sexual Activity    Alcohol use: No    Drug use: No    Sexual activity: Yes     Partners: Female   Lifestyle    Physical activity:     Days per week: Not on file     Minutes per session: Not on file    Stress: Not on file   Relationships    Social connections:     Talks on phone: Not on file     Gets together: Not on file     Attends [employer] service: Not on file     Active member of club or organization: Not on file     Attends meetings of clubs or organizations: Not on file     Relationship status: Not on file   Other Topics Concern    Not on file   Social History Narrative    Not on file     Family History   Problem Relation Age of Onset    Heart disease Mother     Cancer Mother         cancer    Cancer Father         Lung    Hypertension Father     Stroke Father     Cancer Maternal Uncle         Bone    Cancer Maternal Uncle         Throat    Stroke Maternal Uncle     Thyroid disease Sister          Health Maintenance Due   Topic Date Due    Colonoscopy  2018       Review of Systems   HENT: Negative for rhinorrhea and sore throat.    Respiratory: Positive for  "shortness of breath. Negative for wheezing.    Cardiovascular: Negative for chest pain and leg swelling.   Gastrointestinal: Negative for abdominal pain and vomiting.   Musculoskeletal: Negative for neck pain.   Skin: Negative for rash.   Neurological: Negative for headaches.       Objective:   BP (!) 170/90   Pulse 63   Temp 97.8 °F (36.6 °C)   Ht 5' 8" (1.727 m)   Wt (!) 143.3 kg (316 lb)   BMI 48.05 kg/m²     Physical Exam   Constitutional: He is oriented to person, place, and time. He appears well-developed and well-nourished.   HENT:   Head: Normocephalic and atraumatic.   Right Ear: External ear normal.   Left Ear: External ear normal.   Nose: Nose normal.   Mouth/Throat: Oropharynx is clear and moist. No oropharyngeal exudate.   Eyes: Pupils are equal, round, and reactive to light. Conjunctivae and EOM are normal. Right eye exhibits no discharge. Left eye exhibits no discharge. No scleral icterus.   Neck: Normal range of motion. Neck supple. JVD (it is 3 cm) present. No thyromegaly present.   Cardiovascular: Normal rate, regular rhythm, normal heart sounds and intact distal pulses. Exam reveals no gallop and no friction rub.   No murmur heard.  Pulmonary/Chest: Effort normal and breath sounds normal. No respiratory distress. He has no wheezes. He has no rales. He exhibits no tenderness.   Abdominal: Soft. Bowel sounds are normal. He exhibits no distension and no mass. There is no tenderness. There is no rebound and no guarding.   Musculoskeletal: Normal range of motion. He exhibits edema. He exhibits no tenderness.   Lymphadenopathy:     He has no cervical adenopathy.   Neurological: He is alert and oriented to person, place, and time. No cranial nerve deficit. Coordination normal.   Skin: Skin is warm and dry. He is not diaphoretic.   Psychiatric: He has a normal mood and affect.       Assessment:     1. Dyspnea, unspecified type    2. History of colon polyps    3. Family history of colon cancer    4. " Essential hypertension        Plan:   Marvin was seen today for shortness of breath.    Diagnoses and all orders for this visit:    Dyspnea, unspecified type  -     Brain natriuretic peptide; Future  -     CBC auto differential; Future  -     Comprehensive metabolic panel; Future  -     D dimer, quantitative; Future  -     TSH; Future  -     EKG 12-lead; Future  -     X-Ray Chest PA And Lateral; Future    History of colon polyps    Family history of colon cancer    Essential hypertension  -     hydroCHLOROthiazide (HYDRODIURIL) 25 MG tablet; Take 1 tablet (25 mg total) by mouth once daily.    Other orders  -     scheduled: Case request GI: COLONOSCOPY    Track bp at home and see me in 3 weeks.

## 2019-07-24 NOTE — PROGRESS NOTES
There is what appears to be a nodule in the right lung and this may simply be because of shadowing from a nipple of the breast.  Please repeat the study with a nipple marker to confirm if this is a nipple or something inside the lung.

## 2019-07-25 ENCOUNTER — TELEPHONE (OUTPATIENT)
Dept: FAMILY MEDICINE | Facility: CLINIC | Age: 69
End: 2019-07-25

## 2019-07-25 DIAGNOSIS — R93.89 ABNORMAL CXR: Primary | ICD-10-CM

## 2019-07-25 DIAGNOSIS — R06.00 DYSPNEA, UNSPECIFIED TYPE: Primary | ICD-10-CM

## 2019-07-25 NOTE — TELEPHONE ENCOUNTER
I have signed for the following orders AND/OR meds.  Please call the patient and ask the patient to schedule the testing AND/OR inform about any medications that were sent.      Orders Placed This Encounter   Procedures    X-Ray Chest PA And Lateral     Standing Status:   Future     Standing Expiration Date:   7/25/2020     Order Specific Question:   May the Radiologist modify the order per protocol to meet the clinical needs of the patient?     Answer:   Yes

## 2019-07-25 NOTE — TELEPHONE ENCOUNTER
----- Message from Stef Brady MD sent at 7/24/2019  6:19 PM CDT -----  There is what appears to be a nodule in the right lung and this may simply be because of shadowing from a nipple of the breast.  Please repeat the study with a nipple marker to confirm if this is a nipple or something inside the lung.

## 2019-08-06 ENCOUNTER — HOSPITAL ENCOUNTER (OUTPATIENT)
Dept: RADIOLOGY | Facility: HOSPITAL | Age: 69
Discharge: HOME | End: 2019-08-06
Attending: FAMILY MEDICINE
Payer: MEDICARE

## 2019-08-06 ENCOUNTER — OFFICE VISIT (OUTPATIENT)
Dept: CARDIOLOGY | Facility: CLINIC | Age: 69
End: 2019-08-06
Payer: MEDICARE

## 2019-08-06 ENCOUNTER — TELEPHONE (OUTPATIENT)
Dept: CARDIOLOGY | Facility: CLINIC | Age: 69
End: 2019-08-06

## 2019-08-06 VITALS
WEIGHT: 307 LBS | BODY MASS INDEX: 46.53 KG/M2 | HEIGHT: 68 IN | HEART RATE: 68 BPM | SYSTOLIC BLOOD PRESSURE: 130 MMHG | DIASTOLIC BLOOD PRESSURE: 75 MMHG

## 2019-08-06 DIAGNOSIS — E66.01 OBESITY, CLASS III, BMI 40-49.9 (MORBID OBESITY): ICD-10-CM

## 2019-08-06 DIAGNOSIS — E78.5 HYPERLIPIDEMIA, UNSPECIFIED HYPERLIPIDEMIA TYPE: Primary | ICD-10-CM

## 2019-08-06 DIAGNOSIS — I10 ESSENTIAL HYPERTENSION: ICD-10-CM

## 2019-08-06 DIAGNOSIS — G47.33 OBSTRUCTIVE SLEEP APNEA: ICD-10-CM

## 2019-08-06 DIAGNOSIS — R93.89 ABNORMAL CXR: ICD-10-CM

## 2019-08-06 PROCEDURE — 99999 PR PBB SHADOW E&M-EST. PATIENT-LVL III: CPT | Mod: PBBFAC,,, | Performed by: INTERNAL MEDICINE

## 2019-08-06 PROCEDURE — 99204 OFFICE O/P NEW MOD 45 MIN: CPT | Mod: S$PBB,,, | Performed by: INTERNAL MEDICINE

## 2019-08-06 PROCEDURE — 71048 X-RAY EXAM CHEST 4+ VIEWS: CPT | Mod: TC,PO

## 2019-08-06 PROCEDURE — 71048 X-RAY EXAM CHEST 4+ VIEWS: CPT | Mod: 26,,, | Performed by: RADIOLOGY

## 2019-08-06 PROCEDURE — 99204 PR OFFICE/OUTPT VISIT, NEW, LEVL IV, 45-59 MIN: ICD-10-PCS | Mod: S$PBB,,, | Performed by: INTERNAL MEDICINE

## 2019-08-06 PROCEDURE — 99213 OFFICE O/P EST LOW 20 MIN: CPT | Mod: PBBFAC,25,PO | Performed by: INTERNAL MEDICINE

## 2019-08-06 PROCEDURE — 99999 PR PBB SHADOW E&M-EST. PATIENT-LVL III: ICD-10-PCS | Mod: PBBFAC,,, | Performed by: INTERNAL MEDICINE

## 2019-08-06 PROCEDURE — 71048 XR CHEST 4 OR MORE VIEW: ICD-10-PCS | Mod: 26,,, | Performed by: RADIOLOGY

## 2019-08-06 NOTE — LETTER
August 6, 2019      Stef Brady MD  [address]  [place] [address]           [facility]  [address]  [place] [address]  Phone: [phone]  Fax: [phone]          Patient: Marvin Chacon Jr.   MR Number: [id number]   YOB: 1950   Date of Visit: 8/6/2019       Dear Dr. Stef Brady:    Thank you for referring Marvin Chacon to me for evaluation. Attached you will find relevant portions of my assessment and plan of care.    If you have questions, please do not hesitate to call me. I look forward to following Marvin Chacon along with you.    Sincerely,    Adam Durham Jr., MD    Enclosure  CC:  No Recipients    If you would like to receive this communication electronically, please contact [email or web address] or [phone] to request more information on [employer] Link access.    For providers and/or their staff who would like to refer a patient to [facility], please contact us through our one-stop-shop provider referral line, [facility] , at [phone].    If you feel you have received this communication in error or would no longer like to receive these types of communications, please e-mail [email or web address]

## 2019-08-06 NOTE — PROGRESS NOTES
This is great.  There is no nodule.  The questionable area on the previous Chest xray was a nipple shadow.  This is good!

## 2019-08-06 NOTE — PROGRESS NOTES
Subjective:    Patient ID:  Marvin Chacon Jr. is a 69 y.o. male who presents for evaluation of Consult (ref from pcp)      HPI69 yo WM with morbid obesity and SOB. Denies CP. Has been seen by [facility] in [place] and has had extensive cardiac testing in the past six months.     Review of Systems   Constitution: Negative for decreased appetite, fever, malaise/fatigue, weight gain and weight loss.   HENT: Negative for hearing loss and nosebleeds.    Eyes: Negative for visual disturbance.   Cardiovascular: Positive for dyspnea on exertion and leg swelling. Negative for chest pain, claudication, cyanosis, irregular heartbeat, near-syncope, orthopnea, palpitations, paroxysmal nocturnal dyspnea and syncope.   Respiratory: Positive for shortness of breath. Negative for cough, hemoptysis, sleep disturbances due to breathing, snoring and wheezing.    Endocrine: Negative for cold intolerance, heat intolerance, polydipsia and polyuria.   Hematologic/Lymphatic: Negative for adenopathy and bleeding problem. Does not bruise/bleed easily.   Skin: Positive for color change. Negative for itching, poor wound healing, rash and suspicious lesions.   Musculoskeletal: Negative for arthritis, back pain, falls, joint pain, joint swelling, muscle cramps, muscle weakness and myalgias.   Gastrointestinal: Negative for bloating, abdominal pain, change in bowel habit, constipation, flatus, heartburn, hematemesis, hematochezia, hemorrhoids, jaundice, melena, nausea and vomiting.   Genitourinary: Negative for bladder incontinence, decreased libido, frequency, hematuria, hesitancy and urgency.   Neurological: Negative for brief paralysis, difficulty with concentration, excessive daytime sleepiness, dizziness, focal weakness, headaches, light-headedness, loss of balance, numbness, vertigo and weakness.   Psychiatric/Behavioral: Negative for altered mental status, depression and memory loss. The patient does not have insomnia and is not nervous/anxious.   "  Allergic/Immunologic: Negative for environmental allergies, hives and persistent infections.        Objective:    Physical Exam   Constitutional: He is oriented to person, place, and time. He appears well-developed and well-nourished. No distress.   /75   Pulse 68   Ht 5' 8" (1.727 m)   Wt (!) 139.3 kg (307 lb)   BMI 46.68 kg/m²      HENT:   Head: Normocephalic and atraumatic.   Eyes: Pupils are equal, round, and reactive to light. Conjunctivae and lids are normal. Right eye exhibits no discharge. No scleral icterus.   Neck: Normal range of motion. Neck supple. No JVD present. No tracheal deviation present. No thyromegaly present.   Cardiovascular: Normal rate, regular rhythm, S1 normal, S2 normal, normal heart sounds and intact distal pulses. Exam reveals no gallop and no friction rub.   No murmur heard.  Pulses:       Carotid pulses are 2+ on the right side, and 2+ on the left side.       Radial pulses are 2+ on the right side, and 2+ on the left side.        Femoral pulses are 2+ on the right side, and 2+ on the left side.       Popliteal pulses are 2+ on the right side, and 2+ on the left side.        Dorsalis pedis pulses are 2+ on the right side, and 2+ on the left side.        Posterior tibial pulses are 2+ on the right side, and 2+ on the left side.   Pulmonary/Chest: Effort normal and breath sounds normal. No respiratory distress. He has no wheezes. He has no rales. He exhibits no tenderness.   Abdominal: Soft. Bowel sounds are normal. He exhibits no distension and no mass. There is no hepatosplenomegaly or hepatomegaly. There is no tenderness. There is no rebound and no guarding.   Musculoskeletal: Normal range of motion. He exhibits no edema or tenderness.   Lymphadenopathy:     He has no cervical adenopathy.   Neurological: He is alert and oriented to person, place, and time. He has normal reflexes. No cranial nerve deficit. Coordination normal.   Skin: Skin is warm and dry. No rash noted. He " is not diaphoretic. No erythema. No pallor.   Chronic stasis changes   Psychiatric: He has a normal mood and affect. His speech is normal and behavior is normal. Judgment and thought content normal. Cognition and memory are normal.         Assessment:       1. Hyperlipidemia, unspecified hyperlipidemia type    2. Essential hypertension    3. Obesity, Class III, BMI 40-49.9 (morbid obesity)    4. Obstructive sleep apnea         Plan:     Prior to repeating test will obtain records    Explained to patient that his weight is the likely cause of problems    Patient advised to modify risk factors such as weight, exercise, diet,  tobacco and alcohol exposure    No orders of the defined types were placed in this encounter.    Follow up in about 6 months (around 2/6/2020).      Obtained records from [facility]. Had negative nuclear PET stress and normal ECHO in February of this year.

## 2019-09-03 ENCOUNTER — TELEPHONE (OUTPATIENT)
Dept: ENDOSCOPY | Facility: HOSPITAL | Age: 69
End: 2019-09-03

## 2019-09-03 ENCOUNTER — TELEPHONE (OUTPATIENT)
Dept: FAMILY MEDICINE | Facility: CLINIC | Age: 69
End: 2019-09-03

## 2019-09-03 NOTE — TELEPHONE ENCOUNTER
Marvin Chacon Jr.. Staff   Phone Number: [phone]             Zhao Finch     Still awaiting my time of arrival. The appointment time was tentatively 1 pm. I was told that a defintitive time would be relayed to me at a later time.     [place]

## 2019-09-03 NOTE — TELEPHONE ENCOUNTER
----- Message from Marvin Chacon Jr. sent at 9/2/2019  6:06 PM CDT -----  Regarding: RE:Reminder for Upcoming Procedure  Contact: [phone]  Zhao Finch    Still awaiting my time of arrival. The appointment time was tentatively 1 pm. I was told that a defintitive time would be relayed to me at a later time.     [place]  ----- Message -----  From: Stef Brady MD  Sent: 8/29/2019  8:30 AM CDT  To: Marvin Chacon Jr.  Subject: Reminder for Upcoming Procedure  [facility] [address] [place] [address]    08/29/2019      Dear Marvin,      This is a reminder for your upcoming procedure with Stef Brady MD on 9/5/2019. We will contact you again before the day of your procedure with your scheduled arrival time unless you have already received this information from your physicians office.         If you have questions or scheduling concerns, you can contact your physicians office:   Stef Brady MD  Phone Number: [phone]      Sincerely,     [facility] [address] [place] [address]

## 2019-09-05 ENCOUNTER — HOSPITAL ENCOUNTER (OUTPATIENT)
Facility: HOSPITAL | Age: 69
Discharge: HOME | End: 2019-09-05
Attending: FAMILY MEDICINE | Admitting: FAMILY MEDICINE
Payer: MEDICARE

## 2019-09-05 ENCOUNTER — ANESTHESIA EVENT (OUTPATIENT)
Dept: ENDOSCOPY | Facility: HOSPITAL | Age: 69
End: 2019-09-05
Payer: MEDICARE

## 2019-09-05 ENCOUNTER — ANESTHESIA (OUTPATIENT)
Dept: ENDOSCOPY | Facility: HOSPITAL | Age: 69
End: 2019-09-05
Payer: MEDICARE

## 2019-09-05 VITALS
SYSTOLIC BLOOD PRESSURE: 167 MMHG | RESPIRATION RATE: 20 BRPM | TEMPERATURE: 98 F | WEIGHT: 307.31 LBS | OXYGEN SATURATION: 94 % | BODY MASS INDEX: 46.57 KG/M2 | DIASTOLIC BLOOD PRESSURE: 74 MMHG | HEIGHT: 68 IN | HEART RATE: 72 BPM

## 2019-09-05 DIAGNOSIS — Z86.010 HISTORY OF COLON POLYPS: ICD-10-CM

## 2019-09-05 DIAGNOSIS — Z12.11 SPECIAL SCREENING FOR MALIGNANT NEOPLASMS, COLON: ICD-10-CM

## 2019-09-05 DIAGNOSIS — Z12.11 COLON CANCER SCREENING: Primary | ICD-10-CM

## 2019-09-05 DIAGNOSIS — K63.5 POLYP OF COLON, UNSPECIFIED PART OF COLON, UNSPECIFIED TYPE: ICD-10-CM

## 2019-09-05 PROCEDURE — 45385 COLONOSCOPY W/LESION REMOVAL: CPT | Mod: PT,,, | Performed by: FAMILY MEDICINE

## 2019-09-05 PROCEDURE — 63600175 PHARM REV CODE 636 W HCPCS: Performed by: NURSE ANESTHETIST, CERTIFIED REGISTERED

## 2019-09-05 PROCEDURE — 88305 TISSUE EXAM BY PATHOLOGIST: CPT | Performed by: PATHOLOGY

## 2019-09-05 PROCEDURE — 37000008 HC ANESTHESIA 1ST 15 MINUTES: Performed by: FAMILY MEDICINE

## 2019-09-05 PROCEDURE — 63600175 PHARM REV CODE 636 W HCPCS: Performed by: FAMILY MEDICINE

## 2019-09-05 PROCEDURE — 45385 COLONOSCOPY W/LESION REMOVAL: CPT | Performed by: FAMILY MEDICINE

## 2019-09-05 PROCEDURE — 45385 PR COLONOSCOPY,REMV LESN,SNARE: ICD-10-PCS | Mod: PT,,, | Performed by: FAMILY MEDICINE

## 2019-09-05 PROCEDURE — 88305 TISSUE SPECIMEN TO PATHOLOGY - SURGERY: ICD-10-PCS | Mod: 26,,, | Performed by: PATHOLOGY

## 2019-09-05 PROCEDURE — 37000009 HC ANESTHESIA EA ADD 15 MINS: Performed by: FAMILY MEDICINE

## 2019-09-05 PROCEDURE — 27201089 HC SNARE, DISP (ANY): Performed by: FAMILY MEDICINE

## 2019-09-05 PROCEDURE — 88305 TISSUE EXAM BY PATHOLOGIST: CPT | Mod: 26,,, | Performed by: PATHOLOGY

## 2019-09-05 PROCEDURE — 25000003 PHARM REV CODE 250: Performed by: NURSE ANESTHETIST, CERTIFIED REGISTERED

## 2019-09-05 RX ORDER — SODIUM CHLORIDE, SODIUM LACTATE, POTASSIUM CHLORIDE, CALCIUM CHLORIDE 600; 310; 30; 20 MG/100ML; MG/100ML; MG/100ML; MG/100ML
INJECTION, SOLUTION INTRAVENOUS CONTINUOUS PRN
Status: DISCONTINUED | OUTPATIENT
Start: 2019-09-05 | End: 2019-09-05

## 2019-09-05 RX ORDER — SODIUM CHLORIDE, SODIUM LACTATE, POTASSIUM CHLORIDE, CALCIUM CHLORIDE 600; 310; 30; 20 MG/100ML; MG/100ML; MG/100ML; MG/100ML
INJECTION, SOLUTION INTRAVENOUS CONTINUOUS
Status: DISCONTINUED | OUTPATIENT
Start: 2019-09-05 | End: 2019-09-05 | Stop reason: HOSPADM

## 2019-09-05 RX ORDER — SODIUM CHLORIDE 0.9 % (FLUSH) 0.9 %
10 SYRINGE (ML) INJECTION
Status: DISCONTINUED | OUTPATIENT
Start: 2019-09-05 | End: 2019-09-05 | Stop reason: HOSPADM

## 2019-09-05 RX ORDER — PROPOFOL 10 MG/ML
VIAL (ML) INTRAVENOUS
Status: DISCONTINUED | OUTPATIENT
Start: 2019-09-05 | End: 2019-09-05

## 2019-09-05 RX ORDER — LIDOCAINE HYDROCHLORIDE 10 MG/ML
INJECTION, SOLUTION EPIDURAL; INFILTRATION; INTRACAUDAL; PERINEURAL
Status: DISCONTINUED | OUTPATIENT
Start: 2019-09-05 | End: 2019-09-05

## 2019-09-05 RX ADMIN — SODIUM CHLORIDE, SODIUM LACTATE, POTASSIUM CHLORIDE, AND CALCIUM CHLORIDE: 600; 310; 30; 20 INJECTION, SOLUTION INTRAVENOUS at 01:09

## 2019-09-05 RX ADMIN — PROPOFOL 40 MG: 10 INJECTION, EMULSION INTRAVENOUS at 01:09

## 2019-09-05 RX ADMIN — LIDOCAINE HYDROCHLORIDE 50 MG: 10 INJECTION, SOLUTION EPIDURAL; INFILTRATION; INTRACAUDAL; PERINEURAL at 01:09

## 2019-09-05 RX ADMIN — SODIUM CHLORIDE, SODIUM LACTATE, POTASSIUM CHLORIDE, AND CALCIUM CHLORIDE: .6; .31; .03; .02 INJECTION, SOLUTION INTRAVENOUS at 12:09

## 2019-09-05 RX ADMIN — PROPOFOL 50 MG: 10 INJECTION, EMULSION INTRAVENOUS at 01:09

## 2019-09-05 NOTE — [employer] PATIENT INSTRUCTIONS
Discharge Summary/Instructions after an Endoscopic Procedure  Patient Name: Marvin Chacon  Patient MRN: [id number]  Patient YOB: 1950 Thursday, September 05, 2019 Stef Brady MD  RESTRICTIONS:  During your procedure today, you received medications for sedation.  These   medications may affect your judgment, balance and coordination.  Therefore,   for 24 hours, you have the following restrictions:   - DO NOT drive a car, operate machinery, make legal/financial decisions,   sign important papers or drink alcohol.    ACTIVITY:  Today: no heavy lifting, straining or running due to procedural   sedation/anesthesia.  The following day: return to full activity including work.  DIET:  Eat and drink normally unless instructed otherwise.     TREATMENT FOR COMMON SIDE EFFECTS:  - Mild abdominal pain, nausea, belching, bloating or excessive gas:  rest,   eat lightly and use a heating pad.  - Sore Throat: treat with throat lozenges and/or gargle with warm salt   water.  - Because air was used during the procedure, expelling large amounts of air   from your rectum or belching is normal.  - If a bowel prep was taken, you may not have a bowel movement for 1-3 days.    This is normal.  SYMPTOMS TO WATCH FOR AND REPORT TO YOUR PHYSICIAN:  1. Abdominal pain or bloating, other than gas cramps.  2. Chest pain.  3. Back pain.  4. Signs of infection such as: chills or fever occurring within 24 hours   after the procedure.  5. Rectal bleeding, which would show as bright red, maroon, or black stools.   (A tablespoon of blood from the rectum is not serious, especially if   hemorrhoids are present.)  6. Vomiting.  7. Weakness or dizziness.  GO DIRECTLY TO THE NEAREST EMERGENCY ROOM IF YOU HAVE ANY OF THE FOLLOWING:      Difficulty breathing              Chills and/or fever over 101 F   Persistent vomiting and/or vomiting blood   Severe abdominal pain   Severe chest pain   Black, tarry stools   Bleeding- more than one  tablespoon   Any other symptom or condition that you feel may need urgent attention  Your doctor recommends these additional instructions:  If any biopsies were taken, your doctors clinic will contact you in 1 to 2   weeks with any results.  - Patient has a contact number available for emergencies.  The signs and   symptoms of potential delayed complications were discussed with the   patient.  Return to normal activities tomorrow.  Written discharge   instructions were provided to the patient.   - Resume previous diet.   - Continue present medications.   - Await pathology results.   - Repeat colonoscopy in 5 years for surveillance.   - Telephone my office for pathology results in 1 week.   - Discharge patient to home (via wheelchair).  For questions, problems or results please call your physician Stef Brady MD at Work:  [phone]  If you have any questions about the above instructions, call the GI   department at [phone] or call the endoscopy unit at [phone]   from 7am until 3 pm.  [facility], EMERGENCY ROOM PHONE NUMBER:   [phone]  IF A COMPLICATION OR EMERGENCY SITUATION ARISES AND YOU ARE UNABLE TO REACH   YOUR PHYSICIAN - GO DIRECTLY TO THE EMERGENCY ROOM.  I have read or have had read to me these discharge instructions for my   procedure and have received a written copy.  I understand these   instructions and will follow-up with my physician if I have any questions.     __________________________________       _____________________________________  Nurse Signature                                          Patient/Designated   Responsible Party Signature  Stef Brady MD  9/5/2019 2:03:34 PM  This report has been verified and signed electronically.  [employer]

## 2019-09-05 NOTE — DISCHARGE SUMMARY
Endoscopy Discharge Summary      Admit Date: 9/5/2019    Discharge Date and Time:  9/5/2019 2:04 PM    Attending Physician: Stef Brady MD     Discharge Physician: Stef Brady MD     Principal Admitting Diagnoses: Colon cancer screening         Discharge Diagnosis: The primary encounter diagnosis was Colon cancer screening. Diagnoses of Special screening for malignant neoplasms, colon, History of colon polyps, and Polyp of colon, unspecified part of colon, unspecified type were also pertinent to this visit.     Discharged Condition: Good    Indication for Admission: Colon cancer screening     Hospital Course: Patient was admitted for an inpatient procedure and tolerated the procedure well with no complications.    Significant Diagnostic Studies: Colonoscopy with cold snare polypectomy    Pathology (if any):  Specimen (12h ago, onward)    Start     Ordered    09/05/19 1356  Specimen to Pathology - Surgery  Once     Comments:  1. Transverse colon polyp     Start Status     09/05/19 1356 Collected (09/05/19 1402) Order ID: [id number]       09/05/19 1401          Estimated Blood Loss: 1 ml.    Discussed with: patient and family.    Disposition: Home.    Follow Up/Patient Instructions:   Current Discharge Medication List      CONTINUE these medications which have NOT CHANGED    Details   atorvastatin (LIPITOR) 40 MG tablet Take 1 tablet (40 mg total) by mouth once daily.  Qty: 90 tablet, Refills: 3    Associated Diagnoses: Essential hypertension      gabapentin (NEURONTIN) 300 MG capsule Take 300 mg by mouth 2 (two) times daily.      hydroCHLOROthiazide (HYDRODIURIL) 25 MG tablet Take 1 tablet (25 mg total) by mouth once daily.  Qty: 30 tablet, Refills: 11    Associated Diagnoses: Essential hypertension      irbesartan (AVAPRO) 150 MG tablet Take 150 mg by mouth once daily.      meloxicam (MOBIC) 15 MG tablet TAKE 1 TABLET EVERY DAY  Qty: 90 tablet, Refills: 0      metoprolol tartrate (LOPRESSOR) 25 MG tablet  Take 25 mg by mouth 2 (two) times daily.      multivitamin-minerals-lutein (CENTRUM SILVER) Tab Take 1 tablet by mouth once daily. Every day      aspirin (ECOTRIN) 81 MG EC tablet Take 81 mg by mouth once daily.      omega-3 acid ethyl esters (LOVAZA) 1 gram capsule Take 2 capsules (2 g total) by mouth 2 (two) times daily.  Qty: 360 capsule, Refills: 3      promethazine (PHENERGAN) 25 MG tablet Take 1 tablet (25 mg total) by mouth every 6 (six) hours as needed for Nausea.  Qty: 6 tablet, Refills: 0         STOP taking these medications       sodium,potassium,mag sulfates (SUPREP BOWEL PREP KIT) 17.5-3.13-1.6 gram SolR Comments:   Reason for Stopping:               Discharge Procedure Orders   Diet general     Call MD for:  temperature >100.4     Call MD for:  persistent nausea and vomiting     Call MD for:  severe uncontrolled pain     Call MD for:  difficulty breathing, headache or visual disturbances     Call MD for:  redness, tenderness, or signs of infection (pain, swelling, redness, odor or green/yellow discharge around incision site)     Call MD for:  hives     Call MD for:  persistent dizziness or light-headedness     No dressing needed       Follow-up Information     Stef Brady MD. Call in 2 weeks.    Specialty:  Family Medicine  Why:  To receive pathology results.  Contact information:  [id number] [facility] [place] [address] [phone]

## 2019-09-05 NOTE — H&P
Short Stay Endoscopy History and Physical    PCP - Stef Brady MD    Procedure - Colonoscopy  ASA - 2  Mallampati - per anesthesia  History of Anesthesia problems - no  Family history Anesthesia problems -  no     HPI:  This is a 69 y.o. male here for evaluation of :   Active Hospital Problems    Diagnosis  POA    *Colon cancer screening [Z12.11]  Not Applicable    Special screening for malignant neoplasms, colon [Z12.11]  Not Applicable    History of colon polyps [Z86.010]  Not Applicable      Resolved Hospital Problems   No resolved problems to display.         Health Maintenance       Date Due Completion Date    Shingles Vaccine (1 of 2) 07/31/2002 ---    Colonoscopy 06/11/2018 6/11/2015    Override on 4/17/2012: Done    Override on 1/6/2009: Done    Override on 2/10/2004: Done    Influenza Vaccine (1) 09/01/2019 10/15/2015    Lipid Panel 02/12/2020 2/12/2019    TETANUS VACCINE 01/21/2021 1/21/2011          Screening - yes  History of polyps - yes  Diarrhea - no  Anemia - no  Blood in stools - no  Abdominal pain - no  Other - no    ROS:  CONSTITUTIONAL: Denies weight change,  fatigue, fevers, chills, night sweats.  CARDIOVASCULAR: Denies chest pain, shortness of breath, orthopnea and edema.  RESPIRATORY: Denies cough, hemoptysis, dyspnea, and wheezing.  GI: See HPI.    Medical History:   Past Medical History:   Diagnosis Date    Cerebrovascular disease 3/15/2013    LUGO (dyspnea on exertion)     History of colon polyps     Adenomatous polyp of colon (D12.6)    History of nephrolithiasis     History of seasonal allergies     Hyperlipidemia LDL goal < 70     Hypertension     PVD (peripheral vascular disease)     Skin cancer     Sleep apnea     compliant with CPAP    TIA (transient ischemic attack)        Surgical History:   Past Surgical History:   Procedure Laterality Date    COLONOSCOPY N/A 6/11/2015    [facility]    CYST REMOVAL      HERNIA REPAIR       umbilical hernia repair    [place] COLONOSCOPY,BIOPSY  2012    repeat colonoscopy 2015    RELEASE-CARPAL TUNNEL Right 2016    [facility]    rhino fyma N/[facility]      SPINE BIOPSY      Cyst Removed    TONSILLECTOMY      UVULOPALATOPHARYNGOPLASTY      VASCULAR SURGERY Bilateral 2018    Venogram Bilateral 7/10/2018    [facility] CATH       Family History:   Family History   Problem Relation Age of Onset    Heart disease Mother     Cancer Mother         cancer    Cancer Father         Lung    Hypertension Father     Stroke Father     Cancer Maternal Uncle         Bone    Cancer Maternal Uncle         Throat    Stroke Maternal Uncle     Thyroid disease Sister        Social History:   Social History     Tobacco Use    Smoking status: Former Smoker     Packs/day: 0.50     Last attempt to quit: 1982     Years since quittin.3    Smokeless tobacco: Former User     Quit date: 1982   Substance Use Topics    Alcohol use: No    Drug use: No       Allergies:   Review of patient's allergies indicates:   Allergen Reactions    No known drug allergies        Medications:   No current facility-administered medications on file prior to encounter.      Current Outpatient Medications on File Prior to Encounter   Medication Sig Dispense Refill    gabapentin (NEURONTIN) 300 MG capsule Take 300 mg by mouth 2 (two) times daily.      irbesartan (AVAPRO) 150 MG tablet Take 150 mg by mouth once daily.      metoprolol tartrate (LOPRESSOR) 25 MG tablet Take 25 mg by mouth 2 (two) times daily.      multivitamin-minerals-lutein (CENTRUM SILVER) Tab Take 1 tablet by mouth once daily. Every day      sodium,potassium,mag sulfates (SUPREP BOWEL PREP KIT) 17.5-3.13-1.6 gram SolR Take as instructed on prep sheet 354 mL 0    aspirin (ECOTRIN) 81 MG EC tablet Take 81 mg by mouth once daily.      omega-3 acid ethyl esters  (LOVAZA) 1 gram capsule Take 2 capsules (2 g total) by mouth 2 (two) times daily. 360 capsule 3    promethazine (PHENERGAN) 25 MG tablet Take 1 tablet (25 mg total) by mouth every 6 (six) hours as needed for Nausea. 6 tablet 0       Physical Exam:  Vital Signs:   Vitals:    09/05/19 1238   BP: 138/74   Pulse: (!) 55   Resp: 18   Temp: 98.2 °F (36.8 °C)     General Appearance: Well appearing in no acute distress  ENT: OP clear  Chest: CTA B  CV: RRR, no m/r/g  Abd: s/nt/nd/nabs  Ext: no edema    Labs:Reviewed    IMP:  Active Hospital Problems    Diagnosis  POA    *Colon cancer screening [Z12.11]  Not Applicable    Special screening for malignant neoplasms, colon [Z12.11]  Not Applicable    History of colon polyps [Z86.010]  Not Applicable      Resolved Hospital Problems   No resolved problems to display.         Plan:   I have explained the risks and benefits of colonoscopy to the patient including but not limited to bleeding, perforation, infection, and death. The patient wishes to proceed.

## 2019-09-05 NOTE — TRANSFER OF CARE
"Anesthesia Transfer of Care Note    Patient: Marvin Chacon     Procedure(s) Performed: Procedure(s) (LRB):  COLONOSCOPY (N/A)    Patient location: GI    Anesthesia Type: MAC    Transport from OR: Transported from OR on room air with adequate spontaneous ventilation    Post pain: adequate analgesia    Post assessment: no apparent anesthetic complications and tolerated procedure well    Post vital signs: stable    Level of consciousness: awake, alert and oriented    Nausea/Vomiting: no nausea/vomiting    Complications: none    Transfer of care protocol was followed      Last vitals:   Visit Vitals  /74 (BP Location: Left arm, Patient Position: Lying)   Pulse (!) 55   Temp 36.8 °C (98.2 °F) (Skin)   Resp 18   Ht 5' 8" (1.727 m)   Wt (!) 139.4 kg (307 lb 5.1 oz)   SpO2 95%   BMI 46.73 kg/m²     "

## 2019-09-05 NOTE — ANESTHESIA PREPROCEDURE EVALUATION
09/05/2019  Marvin Chacon Jr. is a 69 y.o., male.    Anesthesia Evaluation    I have reviewed the Patient Summary Reports.    I have reviewed the Nursing Notes.   I have reviewed the Medications.     Review of Systems  Anesthesia Hx:  No problems with previous Anesthesia    Social:  Non-Smoker, No Alcohol Use    Hematology/Oncology:  Hematology Normal       -- Cancer in past history:  Other (see Oncology comments) surgery  Oncology Comments: skin     EENT/Dental:EENT/Dental Normal   Cardiovascular:   Hypertension, well controlled hyperlipidemia LUGO ECG has been reviewed. Sinus rhythm with 1st degree A-V block  Left axis deviation  Pulmonary disease pattern  Nonspecific T wave abnormality  Abnormal ECG  When compared with ECG of 07-FEB-2019 15:11,  Nonspecific T wave abnormality, worse in Inferior leads  Confirmed by LILLIAN ECHEVARRIA MD ([id number]) on 7/24/2019 5:44:18 PM   Pulmonary:   Shortness of breath Sleep Apnea, CPAP    Renal/:  Renal/ Normal     Hepatic/GI:  Hepatic/GI Normal Bowel Prep.    Musculoskeletal:   Arthritis   Spine Disorders: lumbar Chronic Pain    Neurological:   TIA,   Peripheral Neuropathy    Endocrine:  Endocrine Normal    Dermatological:  Skin Normal    Psych:  Psychiatric Normal           Physical Exam  General:  Well nourished, Morbid Obesity    Airway/Jaw/Neck:  Airway Findings: Mouth Opening: Normal Tongue: Normal  General Airway Assessment: Adult  Mallampati: III  TM Distance: 4 - 6 cm  Jaw/Neck Findings:      Dental:  Dental Findings: In tact   Chest/Lungs:  Chest/Lungs Findings: Clear to auscultation, Normal Respiratory Rate     Heart/Vascular:  Heart Findings: Rate: Normal  Rhythm: Regular Rhythm  Sounds: Normal        Mental Status:  Mental Status Findings:  Cooperative, Alert and Oriented         Anesthesia Plan  Type of Anesthesia, risks & benefits discussed:  Anesthesia Type:   MAC  Patient's Preference:   Intra-op Monitoring Plan: standard ASA monitors  Intra-op Monitoring Plan Comments:   Post Op Pain Control Plan:   Post Op Pain Control Plan Comments:   Induction:   IV  Beta Blocker:  Patient is on a Beta-Blocker and has received one dose within the past 24 hours (No further documentation required).       Informed Consent: Patient understands risks and agrees with Anesthesia plan.  Questions answered. Anesthesia consent signed with patient.  ASA Score: 3     Day of Surgery Review of History & Physical: I have interviewed and examined the patient. I have reviewed the patient's H&P dated:  There are no significant changes.          Ready For Surgery From Anesthesia Perspective.

## 2019-09-05 NOTE — ANESTHESIA RELEASE NOTE
"Anesthesia Release from PACU Note    Patient: Marvin Chacon     Procedure(s) Performed: Procedure(s) (LRB):  COLONOSCOPY (N/A)    Anesthesia type: MAC    Post pain: Adequate analgesia    Post assessment: no apparent anesthetic complications and tolerated procedure well    Last Vitals:   Visit Vitals  /74 (BP Location: Left arm, Patient Position: Lying)   Pulse (!) 55   Temp 36.8 °C (98.2 °F) (Skin)   Resp 18   Ht 5' 8" (1.727 m)   Wt (!) 139.4 kg (307 lb 5.1 oz)   SpO2 95%   BMI 46.73 kg/m²       Post vital signs: stable    Level of consciousness: awake, alert  and oriented    Nausea/Vomiting: no nausea/no vomiting    Complications: none    Airway Patency: patent    Respiratory: unassisted, spontaneous ventilation, room air    Cardiovascular: stable and blood pressure at baseline    Hydration: euvolemic  "

## 2019-09-05 NOTE — ANESTHESIA POSTPROCEDURE EVALUATION
Anesthesia Post Evaluation    Patient: Marvin Chacon     Procedure(s) Performed: Procedure(s) (LRB):  COLONOSCOPY (N/A)    Final Anesthesia Type: MAC  Patient location during evaluation: GI PACU  Patient participation: Yes- Able to Participate  Level of consciousness: awake and alert and oriented  Post-procedure vital signs: reviewed and stable  Pain management: adequate  Airway patency: patent  PONV status at discharge: No PONV  Anesthetic complications: no      Cardiovascular status: hemodynamically stable  Respiratory status: unassisted, spontaneous ventilation and room air  Hydration status: euvolemic  Follow-up not needed.          Vitals Value Taken Time   /74 9/5/2019 12:38 PM   Temp 36.8 °C (98.2 °F) 9/5/2019 12:38 PM   Pulse 55 9/5/2019 12:38 PM   Resp 18 9/5/2019 12:38 PM   SpO2 95 % 9/5/2019 12:38 PM         No case tracking events are documented in the log.      Pain/Ailyn Score: No data recorded

## 2019-09-09 NOTE — PROGRESS NOTES
Dear Stef Brady MD,    I recently cared for Marvin Chacon Jr. and performed an endoscopy.  Tissue was sent for pathology evaluation and I will have a letter written to ask the patient to repeat the colonoscopy in 5 years.  The pathology showed that there was adenomatous tissue present.  Thank you for allowing me to participate in the care of your patient.  Please call me for any questions that you might have.      Dr. Stef Brady  [phone] cell  [phone] office      NURSING STAFF:Please  inform the patient that I reviewed the recent pathology obtained at the time of colonoscopy.    The results showed that there was adenomatous tissue present which is benign and based on that, I recommend that the patient have a repeat colonoscopy performed in 5 years.     If the patient has MyChart, this message has been sent to them.  Confirm that they read the note.  If not, copy the information and print a letter to send to the patient at this time.  Confirm that a notation to the PCP was done.      Dear Marvin CARBAJAL [place]al[place],    This is to inform you that I have reviewed your recent colonoscopy pathology.  The results showed that you had adenomatous tissue present which is benign and based on that, I recommend that you have a repeat colonoscopy performed in 5 years.      Dr. Stef Brady  [phone]

## 2019-09-11 RX ORDER — MELOXICAM 15 MG/1
TABLET ORAL
Qty: 90 TABLET | Refills: 0 | Status: SHIPPED | OUTPATIENT
Start: 2019-09-11 | End: 2019-11-13 | Stop reason: SDUPTHER

## 2019-10-08 ENCOUNTER — OFFICE VISIT (OUTPATIENT)
Dept: FAMILY MEDICINE | Facility: CLINIC | Age: 69
End: 2019-10-08
Payer: MEDICARE

## 2019-10-08 VITALS
WEIGHT: 303 LBS | DIASTOLIC BLOOD PRESSURE: 82 MMHG | BODY MASS INDEX: 45.92 KG/M2 | SYSTOLIC BLOOD PRESSURE: 141 MMHG | HEIGHT: 68 IN | HEART RATE: 81 BPM | TEMPERATURE: 98 F

## 2019-10-08 DIAGNOSIS — K52.9 GASTROENTERITIS: Primary | ICD-10-CM

## 2019-10-08 PROCEDURE — 99212 PR OFFICE/OUTPT VISIT, EST, LEVL II, 10-19 MIN: ICD-10-PCS | Mod: S$PBB,,, | Performed by: INTERNAL MEDICINE

## 2019-10-08 PROCEDURE — 99999 PR PBB SHADOW E&M-EST. PATIENT-LVL III: CPT | Mod: PBBFAC,,, | Performed by: INTERNAL MEDICINE

## 2019-10-08 PROCEDURE — 99212 OFFICE O/P EST SF 10 MIN: CPT | Mod: S$PBB,,, | Performed by: INTERNAL MEDICINE

## 2019-10-08 PROCEDURE — 99213 OFFICE O/P EST LOW 20 MIN: CPT | Mod: PBBFAC,PO | Performed by: INTERNAL MEDICINE

## 2019-10-08 PROCEDURE — 99999 PR PBB SHADOW E&M-EST. PATIENT-LVL III: ICD-10-PCS | Mod: PBBFAC,,, | Performed by: INTERNAL MEDICINE

## 2019-10-08 NOTE — PATIENT INSTRUCTIONS

## 2019-10-08 NOTE — PROGRESS NOTES
Assessment/Plan:    Gastroenteritis  Symptoms of diarrhea x 2 days. Slowing improving. Able to keep down fluids. Encouraged to continue drinking plenty of fluids. OTC imodium if needed. Stressed importance of hand hygiene.       Follow up if symptoms worsen or fail to improve.          CC: diarrhea      HPI: Patient here with new problem of diarrhea. Recently went out of town to [place] for a convention. Started having diarrhea while away. Started 2 days ago. BM described initially as watery, occurring every 2 hours. Now stool more solid and not occurring as frequently. Worse after eating. Reports sweats/chills but denies fevers. Unknown sick contacts but at large convention around many people. One isolated episode of vomiting but otherwise, denies. Denies abdominal pain. No other complaints at this time.    Past Medical History:  Past Medical History:   Diagnosis Date    Cerebrovascular disease 3/15/2013    LUGO (dyspnea on exertion)     History of colon polyps     Adenomatous polyp of colon (D12.6)    History of nephrolithiasis     History of seasonal allergies     Hyperlipidemia LDL goal < 70     Hypertension     PVD (peripheral vascular disease)     Skin cancer     Sleep apnea     compliant with CPAP    TIA (transient ischemic attack)      Past Surgical History:   Procedure Laterality Date    COLONOSCOPY N/A 9/5/2019    Procedure: COLONOSCOPY;  Surgeon: Stef Brady MD;  Location: [facility] ENDO;  Service: Endoscopy;  Laterality: N/A;    CYST REMOVAL      HERNIA REPAIR      umbilical hernia repair    PHLEBOGRAPHY Bilateral 7/10/2018    Procedure: Venogram;  Surgeon: Tam Pineda MD;  Location: [facility] CATH;  Service: Cardiovascular;  Laterality: Bilateral;  bilateral iliac venography and possible iliac vein stenting via the internal jugular vein    IN COLONOSCOPY,BIOPSY  4/17/2012    repeat colonoscopy 2015    rhino fyma N/[facility]     [facility]      SPINE BIOPSY      Cyst Removed     TONSILLECTOMY      UVULOPALATOPHARYNGOPLASTY      VASCULAR SURGERY Bilateral 2018     Review of patient's allergies indicates:   Allergen Reactions    No known drug allergies      Social History     Tobacco Use    Smoking status: Former Smoker     Packs/day: 0.50     Last attempt to quit: 1982     Years since quittin.4    Smokeless tobacco: Former User     Quit date: 1982   Substance Use Topics    Alcohol use: No    Drug use: No     Family History   Problem Relation Age of Onset    Heart disease Mother     Cancer Mother         cancer    Cancer Father         Lung    Hypertension Father     Stroke Father     Cancer Maternal Uncle         Bone    Cancer Maternal Uncle         Throat    Stroke Maternal Uncle     Thyroid disease Sister      Current Outpatient Medications on File Prior to Visit   Medication Sig Dispense Refill    aspirin (ECOTRIN) 81 MG EC tablet Take 81 mg by mouth once daily.      atorvastatin (LIPITOR) 40 MG tablet Take 1 tablet (40 mg total) by mouth once daily. 90 tablet 3    gabapentin (NEURONTIN) 300 MG capsule Take 300 mg by mouth 2 (two) times daily.      irbesartan (AVAPRO) 150 MG tablet Take 150 mg by mouth once daily.      meloxicam (MOBIC) 15 MG tablet TAKE 1 TABLET EVERY DAY 90 tablet 0    metoprolol tartrate (LOPRESSOR) 25 MG tablet Take 25 mg by mouth 2 (two) times daily.      multivitamin-minerals-lutein (CENTRUM SILVER) Tab Take 1 tablet by mouth once daily. Every day      hydroCHLOROthiazide (HYDRODIURIL) 25 MG tablet Take 1 tablet (25 mg total) by mouth once daily. (Patient not taking: Reported on 10/8/2019) 30 tablet 11    omega-3 acid ethyl esters (LOVAZA) 1 gram capsule Take 2 capsules (2 g total) by mouth 2 (two) times daily. 360 capsule 3    promethazine (PHENERGAN) 25 MG tablet Take 1 tablet (25 mg total) by mouth every 6 (six) hours as needed for Nausea. (Patient not taking: Reported on 10/8/2019) 6 tablet 0     No current  "facility-administered medications on file prior to visit.        Review of Systems   Constitutional: Negative for chills, diaphoresis, fatigue and fever.   HENT: Negative for congestion, ear pain, postnasal drip, sinus pain and sore throat.    Eyes: Negative for pain and redness.   Respiratory: Negative for cough, chest tightness and shortness of breath.    Cardiovascular: Negative for chest pain and leg swelling.   Gastrointestinal: Positive for diarrhea. Negative for abdominal pain, constipation, nausea and vomiting.   Genitourinary: Negative for dysuria and hematuria.   Musculoskeletal: Negative for arthralgias and joint swelling.   Skin: Negative for rash.   Neurological: Negative for dizziness, syncope and headaches.       Vitals:    10/08/19 1518 10/08/19 1545   BP: (!) 145/78 (!) 141/82   Pulse: 88 81   Temp: 98.3 °F (36.8 °C)    Weight: (!) 137.4 kg (303 lb)    Height: 5' 8" (1.727 m)        Wt Readings from Last 3 Encounters:   10/08/19 (!) 137.4 kg (303 lb)   09/05/19 (!) 139.4 kg (307 lb 5.1 oz)   08/06/19 (!) 139.3 kg (307 lb)       Physical Exam   Constitutional: He is oriented to person, place, and time. He appears well-developed and well-nourished. No distress.   HENT:   Head: Normocephalic and atraumatic.   Eyes: Conjunctivae and EOM are normal.   Neck: Normal range of motion. Neck supple.   Cardiovascular: Normal rate, regular rhythm, normal heart sounds and intact distal pulses.   No murmur heard.  Pulmonary/Chest: Effort normal and breath sounds normal. No respiratory distress.   Abdominal: Soft. Bowel sounds are normal. He exhibits no distension. There is no tenderness.   Musculoskeletal: Normal range of motion.   Neurological: He is alert and oriented to person, place, and time.   Skin: Skin is warm and dry. No rash noted.   Psychiatric: He has a normal mood and affect.       "

## 2019-10-08 NOTE — ASSESSMENT & PLAN NOTE
Symptoms of diarrhea x 2 days. Slowing improving. Able to keep down fluids. Encouraged to continue drinking plenty of fluids. OTC imodium if needed. Stressed importance of hand hygiene. Call or return to clinic if symptoms fail to improve.

## 2019-10-31 ENCOUNTER — EXTERNAL CHRONIC CARE MANAGEMENT (OUTPATIENT)
Dept: PRIMARY CARE CLINIC | Facility: CLINIC | Age: 69
End: 2019-10-31
Payer: MEDICARE

## 2019-10-31 PROCEDURE — 99490 PR CHRONIC CARE MGMT, 1ST 20 MIN: ICD-10-PCS | Mod: S$PBB,,, | Performed by: FAMILY MEDICINE

## 2019-10-31 PROCEDURE — 99490 CHRNC CARE MGMT STAFF 1ST 20: CPT | Mod: S$PBB,,, | Performed by: FAMILY MEDICINE

## 2019-10-31 PROCEDURE — 99490 CHRNC CARE MGMT STAFF 1ST 20: CPT | Mod: PBBFAC,PO | Performed by: FAMILY MEDICINE

## 2019-11-13 RX ORDER — MELOXICAM 15 MG/1
TABLET ORAL
Qty: 90 TABLET | Refills: 0 | Status: SHIPPED | OUTPATIENT
Start: 2019-11-13 | End: 2020-01-26

## 2019-11-30 ENCOUNTER — EXTERNAL CHRONIC CARE MANAGEMENT (OUTPATIENT)
Dept: PRIMARY CARE CLINIC | Facility: CLINIC | Age: 69
End: 2019-11-30
Payer: MEDICARE

## 2019-11-30 PROCEDURE — 99490 CHRNC CARE MGMT STAFF 1ST 20: CPT | Mod: PBBFAC,PO | Performed by: FAMILY MEDICINE

## 2019-11-30 PROCEDURE — 99490 PR CHRONIC CARE MGMT, 1ST 20 MIN: ICD-10-PCS | Mod: S$PBB,,, | Performed by: FAMILY MEDICINE

## 2019-11-30 PROCEDURE — 99490 CHRNC CARE MGMT STAFF 1ST 20: CPT | Mod: S$PBB,,, | Performed by: FAMILY MEDICINE

## 2019-12-20 ENCOUNTER — PATIENT OUTREACH (OUTPATIENT)
Dept: ADMINISTRATIVE | Facility: HOSPITAL | Age: 69
End: 2019-12-20

## 2019-12-20 ENCOUNTER — TELEPHONE (OUTPATIENT)
Dept: ADMINISTRATIVE | Facility: HOSPITAL | Age: 69
End: 2019-12-20

## 2019-12-20 NOTE — PROGRESS NOTES
Telephone message sent to Dr. Brady staff from [facility] regarding refill for gabapentin.

## 2019-12-31 ENCOUNTER — EXTERNAL CHRONIC CARE MANAGEMENT (OUTPATIENT)
Dept: PRIMARY CARE CLINIC | Facility: CLINIC | Age: 69
End: 2019-12-31
Payer: MEDICARE

## 2019-12-31 PROCEDURE — 99490 CHRNC CARE MGMT STAFF 1ST 20: CPT | Mod: PBBFAC,PO | Performed by: FAMILY MEDICINE

## 2019-12-31 PROCEDURE — 99490 CHRNC CARE MGMT STAFF 1ST 20: CPT | Mod: S$PBB,,, | Performed by: FAMILY MEDICINE

## 2019-12-31 PROCEDURE — 99490 PR CHRONIC CARE MGMT, 1ST 20 MIN: ICD-10-PCS | Mod: S$PBB,,, | Performed by: FAMILY MEDICINE

## 2020-01-07 ENCOUNTER — OFFICE VISIT (OUTPATIENT)
Dept: FAMILY MEDICINE | Facility: CLINIC | Age: 70
End: 2020-01-07
Payer: MEDICARE

## 2020-01-07 VITALS
SYSTOLIC BLOOD PRESSURE: 144 MMHG | TEMPERATURE: 98 F | WEIGHT: 315 LBS | HEART RATE: 62 BPM | HEIGHT: 68 IN | DIASTOLIC BLOOD PRESSURE: 65 MMHG | BODY MASS INDEX: 47.74 KG/M2

## 2020-01-07 DIAGNOSIS — J20.9 BRONCHITIS WITH BRONCHOSPASM: Primary | ICD-10-CM

## 2020-01-07 PROCEDURE — 99213 PR OFFICE/OUTPT VISIT, EST, LEVL III, 20-29 MIN: ICD-10-PCS | Mod: S$PBB,,, | Performed by: NURSE PRACTITIONER

## 2020-01-07 PROCEDURE — 99999 PR PBB SHADOW E&M-EST. PATIENT-LVL III: CPT | Mod: PBBFAC,,, | Performed by: NURSE PRACTITIONER

## 2020-01-07 PROCEDURE — 99213 OFFICE O/P EST LOW 20 MIN: CPT | Mod: S$PBB,,, | Performed by: NURSE PRACTITIONER

## 2020-01-07 PROCEDURE — 1159F MED LIST DOCD IN RCRD: CPT | Mod: ,,, | Performed by: NURSE PRACTITIONER

## 2020-01-07 PROCEDURE — 1159F PR MEDICATION LIST DOCUMENTED IN MEDICAL RECORD: ICD-10-PCS | Mod: ,,, | Performed by: NURSE PRACTITIONER

## 2020-01-07 PROCEDURE — 99999 PR PBB SHADOW E&M-EST. PATIENT-LVL III: ICD-10-PCS | Mod: PBBFAC,,, | Performed by: NURSE PRACTITIONER

## 2020-01-07 PROCEDURE — 1126F AMNT PAIN NOTED NONE PRSNT: CPT | Mod: ,,, | Performed by: NURSE PRACTITIONER

## 2020-01-07 PROCEDURE — 1126F PR PAIN SEVERITY QUANTIFIED, NO PAIN PRESENT: ICD-10-PCS | Mod: ,,, | Performed by: NURSE PRACTITIONER

## 2020-01-07 PROCEDURE — 99213 OFFICE O/P EST LOW 20 MIN: CPT | Mod: PBBFAC,PO | Performed by: NURSE PRACTITIONER

## 2020-01-07 RX ORDER — PREDNISONE 20 MG/1
20 TABLET ORAL 2 TIMES DAILY
Qty: 10 TABLET | Refills: 0 | Status: SHIPPED | OUTPATIENT
Start: 2020-01-07 | End: 2020-01-12

## 2020-01-07 NOTE — PROGRESS NOTES
Subjective:       Patient ID: Marvin Chacon Jr. is a 69 y.o. male.    Chief Complaint: Sinusitis and Cough    Cough   This is a new problem. The current episode started in the past 7 days. The problem has been unchanged. The problem occurs every few minutes. Associated symptoms include postnasal drip and wheezing. Pertinent negatives include no chest pain, fever, myalgias or rash. He has tried nothing for the symptoms.       Review of Systems   Constitutional: Negative for fatigue, fever and unexpected weight change.   HENT: Positive for postnasal drip.    Eyes: Negative.  Negative for pain and visual disturbance.   Respiratory: Positive for cough and wheezing.    Cardiovascular: Negative for chest pain and palpitations.   Gastrointestinal: Negative for abdominal pain, diarrhea, nausea and vomiting.   Genitourinary: Negative for dysuria and frequency.   Musculoskeletal: Negative for arthralgias and myalgias.   Skin: Negative for color change and rash.   Neurological: Negative for dizziness.   Psychiatric/Behavioral: Negative for sleep disturbance. The patient is not nervous/anxious.        Vitals:    01/07/20 1342   BP: (!) 144/65   Pulse: 62   Temp: 97.9 °F (36.6 °C)       Objective:     Current Outpatient Medications   Medication Sig Dispense Refill    aspirin (ECOTRIN) 81 MG EC tablet Take 81 mg by mouth once daily.      atorvastatin (LIPITOR) 40 MG tablet Take 1 tablet (40 mg total) by mouth once daily. 90 tablet 3    gabapentin (NEURONTIN) 300 MG capsule Take 300 mg by mouth 2 (two) times daily.      irbesartan (AVAPRO) 150 MG tablet Take 150 mg by mouth once daily.      meloxicam (MOBIC) 15 MG tablet TAKE 1 TABLET EVERY DAY 90 tablet 0    metoprolol tartrate (LOPRESSOR) 25 MG tablet Take 25 mg by mouth 2 (two) times daily.      multivitamin-minerals-lutein (CENTRUM SILVER) Tab Take 1 tablet by mouth once daily. Every day      hydroCHLOROthiazide (HYDRODIURIL) 25 MG tablet Take 1 tablet (25 mg total)  by mouth once daily. (Patient not taking: Reported on 10/8/2019) 30 tablet 11    omega-3 acid ethyl esters (LOVAZA) 1 gram capsule Take 2 capsules (2 g total) by mouth 2 (two) times daily. 360 capsule 3    predniSONE (DELTASONE) 20 MG tablet Take 1 tablet (20 mg total) by mouth 2 (two) times daily. for 5 days 10 tablet 0    promethazine (PHENERGAN) 25 MG tablet Take 1 tablet (25 mg total) by mouth every 6 (six) hours as needed for Nausea. (Patient not taking: Reported on 10/8/2019) 6 tablet 0     No current facility-administered medications for this visit.        Physical Exam   Constitutional: He is oriented to person, place, and time. He appears well-developed. No distress.   HENT:   Head: Normocephalic and atraumatic.   Nose: Nose normal.   Mouth/Throat: Posterior oropharyngeal edema present.   Bilateral canals impacted with cerumen   Eyes: Pupils are equal, round, and reactive to light. EOM are normal.   Neck: Normal range of motion. Neck supple.   Cardiovascular: Normal rate and regular rhythm.   Pulmonary/Chest: Effort normal. He has wheezes.   Dry cough   Musculoskeletal: Normal range of motion.   Neurological: He is alert and oriented to person, place, and time.   Skin: Skin is warm and dry. No rash noted.   Psychiatric: He has a normal mood and affect. Thought content normal.   Nursing note and vitals reviewed.      Assessment:       1. Bronchitis with bronchospasm        Plan:   Bronchitis with bronchospasm    Other orders  -     predniSONE (DELTASONE) 20 MG tablet; Take 1 tablet (20 mg total) by mouth 2 (two) times daily. for 5 days  Dispense: 10 tablet; Refill: 0        No follow-ups on file.    There are no Patient Instructions on file for this visit.

## 2020-01-26 RX ORDER — MELOXICAM 15 MG/1
TABLET ORAL
Qty: 90 TABLET | Refills: 11 | Status: SHIPPED | OUTPATIENT
Start: 2020-01-26 | End: 2021-03-17

## 2020-01-30 PROBLEM — I87.2 VENOUS INSUFFICIENCY: Status: ACTIVE | Noted: 2020-01-30

## 2020-01-30 PROBLEM — I87.1 COMPRESSION OF VEIN: Status: ACTIVE | Noted: 2020-01-30

## 2020-01-31 ENCOUNTER — EXTERNAL CHRONIC CARE MANAGEMENT (OUTPATIENT)
Dept: PRIMARY CARE CLINIC | Facility: CLINIC | Age: 70
End: 2020-01-31
Payer: MEDICARE

## 2020-01-31 PROCEDURE — 99490 PR CHRONIC CARE MGMT, 1ST 20 MIN: ICD-10-PCS | Mod: S$PBB,,, | Performed by: FAMILY MEDICINE

## 2020-01-31 PROCEDURE — 99490 CHRNC CARE MGMT STAFF 1ST 20: CPT | Mod: S$PBB,,, | Performed by: FAMILY MEDICINE

## 2020-01-31 PROCEDURE — 99490 CHRNC CARE MGMT STAFF 1ST 20: CPT | Mod: PBBFAC,PO | Performed by: FAMILY MEDICINE

## 2020-02-09 ENCOUNTER — PATIENT MESSAGE (OUTPATIENT)
Dept: CARDIOLOGY | Facility: CLINIC | Age: 70
End: 2020-02-09

## 2020-02-10 ENCOUNTER — PATIENT MESSAGE (OUTPATIENT)
Dept: FAMILY MEDICINE | Facility: CLINIC | Age: 70
End: 2020-02-10

## 2020-02-10 DIAGNOSIS — I10 ESSENTIAL HYPERTENSION: ICD-10-CM

## 2020-02-11 RX ORDER — ATORVASTATIN CALCIUM 40 MG/1
40 TABLET, FILM COATED ORAL DAILY
Qty: 90 TABLET | Refills: 3 | Status: SHIPPED | OUTPATIENT
Start: 2020-02-11 | End: 2021-08-04 | Stop reason: SDUPTHER

## 2020-02-17 ENCOUNTER — OFFICE VISIT (OUTPATIENT)
Dept: CARDIOLOGY | Facility: CLINIC | Age: 70
End: 2020-02-17
Payer: MEDICARE

## 2020-02-17 VITALS
BODY MASS INDEX: 47.44 KG/M2 | SYSTOLIC BLOOD PRESSURE: 142 MMHG | HEART RATE: 67 BPM | WEIGHT: 313 LBS | HEIGHT: 68 IN | DIASTOLIC BLOOD PRESSURE: 82 MMHG

## 2020-02-17 DIAGNOSIS — I10 ESSENTIAL HYPERTENSION: Primary | ICD-10-CM

## 2020-02-17 DIAGNOSIS — E78.5 HYPERLIPIDEMIA, UNSPECIFIED HYPERLIPIDEMIA TYPE: ICD-10-CM

## 2020-02-17 DIAGNOSIS — I87.2 VENOUS INSUFFICIENCY: ICD-10-CM

## 2020-02-17 PROCEDURE — 99214 PR OFFICE/OUTPT VISIT, EST, LEVL IV, 30-39 MIN: ICD-10-PCS | Mod: S$PBB,,, | Performed by: INTERNAL MEDICINE

## 2020-02-17 PROCEDURE — 99214 OFFICE O/P EST MOD 30 MIN: CPT | Mod: S$PBB,,, | Performed by: INTERNAL MEDICINE

## 2020-02-17 PROCEDURE — 99212 OFFICE O/P EST SF 10 MIN: CPT | Mod: PBBFAC,PO | Performed by: INTERNAL MEDICINE

## 2020-02-17 PROCEDURE — 99999 PR PBB SHADOW E&M-EST. PATIENT-LVL II: CPT | Mod: PBBFAC,,, | Performed by: INTERNAL MEDICINE

## 2020-02-17 PROCEDURE — 99999 PR PBB SHADOW E&M-EST. PATIENT-LVL II: ICD-10-PCS | Mod: PBBFAC,,, | Performed by: INTERNAL MEDICINE

## 2020-02-17 RX ORDER — METOPROLOL SUCCINATE 25 MG/1
TABLET, EXTENDED RELEASE ORAL
COMMUNITY
Start: 2020-01-28 | End: 2020-10-26

## 2020-02-17 NOTE — PROGRESS NOTES
Subjective:    Patient ID:  Marvin Chacon Jr. is a 69 y.o. male who presents for evaluation of Follow-up (follow up )      HPI69 yo WM with morbid obesity who had negative PET stress test and normal echo by [facility] in February of 2019.Went back to [facility] and had peripheral stent in right leg for 55% blockage and rest pain(cramps a tnight). Had no exertional leg pain.    Review of Systems   Constitution: Negative for decreased appetite, fever, malaise/fatigue, weight gain and weight loss.   HENT: Negative for hearing loss and nosebleeds.    Eyes: Negative for visual disturbance.   Cardiovascular: Negative for chest pain, claudication, cyanosis, dyspnea on exertion, irregular heartbeat, leg swelling, near-syncope, orthopnea, palpitations, paroxysmal nocturnal dyspnea and syncope.   Respiratory: Positive for shortness of breath. Negative for cough, hemoptysis, sleep disturbances due to breathing, snoring and wheezing.    Endocrine: Negative for cold intolerance, heat intolerance, polydipsia and polyuria.   Hematologic/Lymphatic: Negative for adenopathy and bleeding problem. Does not bruise/bleed easily.   Skin: Negative for color change, itching, poor wound healing, rash and suspicious lesions.   Musculoskeletal: Negative for arthritis, back pain, falls, joint pain, joint swelling, muscle cramps, muscle weakness and myalgias.   Gastrointestinal: Negative for bloating, abdominal pain, change in bowel habit, constipation, flatus, heartburn, hematemesis, hematochezia, hemorrhoids, jaundice, melena, nausea and vomiting.   Genitourinary: Negative for bladder incontinence, decreased libido, frequency, hematuria, hesitancy and urgency.   Neurological: Negative for brief paralysis, difficulty with concentration, excessive daytime sleepiness, dizziness, focal weakness, headaches, light-headedness, loss of balance, numbness, vertigo and weakness.   Psychiatric/Behavioral: Negative for altered mental status, depression and memory  "loss. The patient does not have insomnia and is not nervous/anxious.    Allergic/Immunologic: Negative for environmental allergies, hives and persistent infections.        Objective:    Physical Exam   Constitutional: He is oriented to person, place, and time. He appears well-developed and well-nourished. No distress.   BP (!) 142/82   Pulse 67   Ht 5' 8" (1.727 m)   Wt (!) 142 kg (313 lb)   BMI 47.59 kg/m²      HENT:   Head: Normocephalic and atraumatic.   Eyes: Pupils are equal, round, and reactive to light. Conjunctivae and lids are normal. Right eye exhibits no discharge. No scleral icterus.   Neck: Normal range of motion. Neck supple. No JVD present. No tracheal deviation present. No thyromegaly present.   Cardiovascular: Normal rate, regular rhythm, S1 normal, S2 normal, normal heart sounds and intact distal pulses. Exam reveals no gallop and no friction rub.   No murmur heard.  Pulses:       Carotid pulses are 2+ on the right side, and 2+ on the left side.       Radial pulses are 2+ on the right side, and 2+ on the left side.        Femoral pulses are 2+ on the right side, and 2+ on the left side.       Popliteal pulses are 2+ on the right side, and 2+ on the left side.        Dorsalis pedis pulses are 2+ on the right side, and 2+ on the left side.        Posterior tibial pulses are 2+ on the right side, and 2+ on the left side.   Pulmonary/Chest: Effort normal and breath sounds normal. No respiratory distress. He has no wheezes. He has no rales. He exhibits no tenderness.   Abdominal: Soft. Bowel sounds are normal. He exhibits no distension and no mass. There is no hepatosplenomegaly or hepatomegaly. There is no tenderness. There is no rebound and no guarding.   Musculoskeletal: Normal range of motion. He exhibits no edema or tenderness.   Lymphadenopathy:     He has no cervical adenopathy.   Neurological: He is alert and oriented to person, place, and time. He has normal reflexes. No cranial nerve " deficit. Coordination normal.   Skin: Skin is warm and dry. No rash noted. He is not diaphoretic. No erythema. No pallor.   Psychiatric: He has a normal mood and affect. His speech is normal and behavior is normal. Judgment and thought content normal. Cognition and memory are normal.         Assessment:       1. Essential hypertension    2. Hyperlipidemia, unspecified hyperlipidemia type    3. Venous insufficiency         Plan:     He has had further testing and monitors put on by [facility]. Suggested he follow with them and will see him for anything urgent.     No orders of the defined types were placed in this encounter.    Follow up if symptoms worsen or fail to improve.

## 2020-02-29 ENCOUNTER — EXTERNAL CHRONIC CARE MANAGEMENT (OUTPATIENT)
Dept: PRIMARY CARE CLINIC | Facility: CLINIC | Age: 70
End: 2020-02-29
Payer: MEDICARE

## 2020-02-29 PROCEDURE — 99490 PR CHRONIC CARE MGMT, 1ST 20 MIN: ICD-10-PCS | Mod: S$PBB,,, | Performed by: FAMILY MEDICINE

## 2020-02-29 PROCEDURE — 99490 CHRNC CARE MGMT STAFF 1ST 20: CPT | Mod: PBBFAC,PO | Performed by: FAMILY MEDICINE

## 2020-02-29 PROCEDURE — 99490 CHRNC CARE MGMT STAFF 1ST 20: CPT | Mod: S$PBB,,, | Performed by: FAMILY MEDICINE

## 2020-03-26 ENCOUNTER — OFFICE VISIT (OUTPATIENT)
Dept: FAMILY MEDICINE | Facility: CLINIC | Age: 70
End: 2020-03-26
Payer: MEDICARE

## 2020-03-26 ENCOUNTER — PATIENT MESSAGE (OUTPATIENT)
Dept: FAMILY MEDICINE | Facility: CLINIC | Age: 70
End: 2020-03-26

## 2020-03-26 DIAGNOSIS — M25.511 ACUTE PAIN OF RIGHT SHOULDER: Primary | ICD-10-CM

## 2020-03-26 PROCEDURE — 99213 PR OFFICE/OUTPT VISIT, EST, LEVL III, 20-29 MIN: ICD-10-PCS | Mod: 95,,, | Performed by: FAMILY MEDICINE

## 2020-03-26 PROCEDURE — 99213 OFFICE O/P EST LOW 20 MIN: CPT | Mod: 95,,, | Performed by: FAMILY MEDICINE

## 2020-03-26 RX ORDER — METHYLPREDNISOLONE 4 MG/1
TABLET ORAL
Qty: 21 TABLET | Refills: 0 | Status: SHIPPED | OUTPATIENT
Start: 2020-03-26 | End: 2020-10-26

## 2020-03-26 NOTE — PROGRESS NOTES
Primary Care Telemedicine Note    The patient location is:  Patient Home   The chief complaint leading to consultation is: right shoulder pain.  Total time spent with patient: 10 minutes      Visit type: Virtual visit with synchronous audio only and video  Each patient to whom he or she provides medical services by telemedicine is:  (1) informed of the relationship between the physician and patient and the respective role of any other health care provider with respect to management of the patient; and (2) notified that he or she may decline to receive medical services by telemedicine and may withdraw from such care at any time.    Subjective:      Patient ID: Marvin Chacon Jr. is a 69 y.o. male.    Chief Complaint: right shoulder pain.   HPI 5 days ago, he awoke and he was in pain in the right shoulder.  He states that the patine was 3-4.  The second night, he could not sleep at all.  He states that his pain worsened to 5-6.  He did not do anything for it then.  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   He has not been working with the right hand much.  The 3rd day, he was hurting still and he put some icy hot on it an dhe states that this did not help much.  He did take medicine the first time.  He texted Rafael Lagos and he told him to take the meloxicam. He did take that.  He took it this AM.  He has restriction of his movement.  He can't raise his arm laterally.  The pain is from the lateral deltoid up to the neck.    Review of Systems    Objective:      Physical Exam   Constitutional: He is oriented to person, place, and time. He appears well-developed and well-nourished. No distress.   Eyes: EOM are normal.   Pulmonary/Chest: Effort normal. No respiratory distress.   Musculoskeletal:        Right shoulder: He exhibits decreased range of motion, tenderness (His RIGHT Ac joint is where he selfpalpates the greatest pain. ), swelling and pain. He exhibits no deformity.   Neurological: He is alert  and oriented to person, place, and time.   Skin: No rash noted. Rash is not vesicular. He is not diaphoretic.   Psychiatric: He has a normal mood and affect. His behavior is normal. Judgment and thought content normal.       Assessment:       1. Acute pain of right shoulder        Plan:       Diagnoses and all orders for this visit:    Acute pain of right shoulder    Other orders  -     methylPREDNISolone (MEDROL DOSEPACK) 4 mg tablet; follow package directions    use shoulder stretches.  Consider an xray if not better in several weeks.  He it to take the meloxicam 15 mg po q day.  Use shoulder stretches and ice to help with this and if not better soon, will consider PT.

## 2020-03-30 ENCOUNTER — PATIENT MESSAGE (OUTPATIENT)
Dept: FAMILY MEDICINE | Facility: CLINIC | Age: 70
End: 2020-03-30

## 2020-03-31 ENCOUNTER — EXTERNAL CHRONIC CARE MANAGEMENT (OUTPATIENT)
Dept: PRIMARY CARE CLINIC | Facility: CLINIC | Age: 70
End: 2020-03-31
Payer: MEDICARE

## 2020-03-31 PROCEDURE — 99490 PR CHRONIC CARE MGMT, 1ST 20 MIN: ICD-10-PCS | Mod: S$PBB,,, | Performed by: FAMILY MEDICINE

## 2020-03-31 PROCEDURE — 99490 CHRNC CARE MGMT STAFF 1ST 20: CPT | Mod: S$PBB,,, | Performed by: FAMILY MEDICINE

## 2020-03-31 PROCEDURE — 99490 CHRNC CARE MGMT STAFF 1ST 20: CPT | Mod: PBBFAC,PO | Performed by: FAMILY MEDICINE

## 2020-04-08 ENCOUNTER — PATIENT MESSAGE (OUTPATIENT)
Dept: FAMILY MEDICINE | Facility: CLINIC | Age: 70
End: 2020-04-08

## 2020-04-08 RX ORDER — IRBESARTAN 300 MG/1
300 TABLET ORAL NIGHTLY
Qty: 90 TABLET | Refills: 3 | Status: SHIPPED | OUTPATIENT
Start: 2020-04-08 | End: 2021-08-04 | Stop reason: SDUPTHER

## 2020-04-08 NOTE — TELEPHONE ENCOUNTER
His bp has run high at home.  [facility]  Get this arranged in 2 weeks.  The      I have signed for the following orders AND/OR meds.  Please call the patient and ask the patient to schedule the testing AND/OR inform about any medications that were sent.      No orders of the defined types were placed in this encounter.      Medications Ordered This Encounter   Medications    irbesartan (AVAPRO) 300 MG tablet     Sig: Take 1 tablet (300 mg total) by mouth every evening.     Dispense:  90 tablet     Refill:  3

## 2020-04-14 RX ORDER — IRBESARTAN 300 MG/1
300 TABLET ORAL NIGHTLY
Qty: 90 TABLET | Refills: 3 | OUTPATIENT
Start: 2020-04-14 | End: 2021-04-14

## 2020-04-17 ENCOUNTER — TELEPHONE (OUTPATIENT)
Dept: FAMILY MEDICINE | Facility: CLINIC | Age: 70
End: 2020-04-17

## 2020-04-17 NOTE — TELEPHONE ENCOUNTER
----- Message from Faizan Zafar sent at 4/17/2020  8:05 AM CDT -----  Contact: [facility] Deluca   ..Type:  Pharmacy Calling to Clarify an RX    Name of Caller patrica  [facility]   Prescription Name: irbesartan    What do they need to clarify?: correct dosage  Best Call Back Number [phone]   Additional Information

## 2020-04-23 ENCOUNTER — PATIENT MESSAGE (OUTPATIENT)
Dept: FAMILY MEDICINE | Facility: CLINIC | Age: 70
End: 2020-04-23

## 2020-04-23 NOTE — PROGRESS NOTES
Primary Care Telemedicine Note    The patient location is:  Patient Home   The chief complaint leading to consultation is: Hypertension  Total time spent with patient: ***      Visit type: Virtual visit with synchronous audio only and video  Each patient to whom he or she provides medical services by telemedicine is:  (1) informed of the relationship between the physician and patient and the respective role of any other health care provider with respect to management of the patient; and (2) notified that he or she may decline to receive medical services by telemedicine and may withdraw from such care at any time.    Subjective:      Patient ID: Marvin Chacon Jr. is a 69 y.o. male.    Chief Complaint: Hypertension  HPIThe patient presents with essential hypertension.  The patient is tolerating the medication well and is in excellent compliance.  The patient is experiencing no side effects.  Counseling was offered regarding low salt diets.  The patient has a reduced salt intake.  The patient denies chest pain, palpitations, shortness of breath, dyspnea on exertion, left or murmur neck pain, nausea, vomiting, diaphoresis, paroxysmal nocturnal dyspnea, and orthopnea.   Hypertension Medications             irbesartan (AVAPRO) 300 MG tablet Take 1 tablet (300 mg total) by mouth every evening.    metoprolol succinate (TOPROL-XL) 25 MG 24 hr tablet     metoprolol tartrate (LOPRESSOR) 25 MG tablet Take 25 mg by mouth 2 (two) times daily.    nitroGLYCERIN (NITROSTAT) 0.4 MG SL tablet Place 0.4 mg under the tongue every 5 (five) minutes as needed.        The current medication list that we have since it was last reconciled is as follows:  Current Outpatient Medications on File Prior to Visit   Medication Sig Dispense Refill    aspirin (ECOTRIN) 81 MG EC tablet Take 81 mg by mouth once daily.      atorvastatin (LIPITOR) 40 MG tablet TAKE 1 TABLET (40 MG TOTAL) BY MOUTH ONCE DAILY. 90 tablet 3    clopidogrel (PLAVIX) 75 mg  tablet Take 1 tablet (75 mg total) by mouth once daily. 30 tablet 11    gabapentin (NEURONTIN) 300 MG capsule Take 300 mg by mouth 2 (two) times daily.      irbesartan (AVAPRO) 300 MG tablet Take 1 tablet (300 mg total) by mouth every evening. 90 tablet 3    meloxicam (MOBIC) 15 MG tablet TAKE 1 TABLET EVERY DAY 90 tablet 11    methylPREDNISolone (MEDROL DOSEPACK) 4 mg tablet follow package directions 21 tablet 0    metoprolol succinate (TOPROL-XL) 25 MG 24 hr tablet       metoprolol tartrate (LOPRESSOR) 25 MG tablet Take 25 mg by mouth 2 (two) times daily.      multivitamin-minerals-lutein (CENTRUM SILVER) Tab Take 1 tablet by mouth once daily. Every day      nitroGLYCERIN (NITROSTAT) 0.4 MG SL tablet Place 0.4 mg under the tongue every 5 (five) minutes as needed.      promethazine (PHENERGAN) 25 MG tablet Take 1 tablet (25 mg total) by mouth every 6 (six) hours as needed for Nausea. 6 tablet 0     No current facility-administered medications on file prior to visit.      Health Maintenance Due   Topic Date Due    Lipid Panel  02/12/2020       Review of Systems  Constitutional: Negative for chills and fever.   Respiratory: Negative for cough and wheezing.    Cardiovascular: Negative for chest pain and palpitations.       Objective:      The patient has been recording blood pressures and pulses at home and the following is the data that was reviewed to make an evaluation and management decision today.  No flowsheet data found.  No flowsheet data found.  Physical Exam  Constitutional: The patient is oriented to person, place, and time. He appears well-developed and well-nourished.   Pulmonary/Chest: Effort normal. No respiratory distress.   Neurological: He is alert and oriented to person, place, and time.   Psychiatric: He has a normal mood and affect. His behavior is normal. Judgment and thought content normal.       Assessment:       No diagnosis found.    Plan:       ***

## 2020-04-30 ENCOUNTER — EXTERNAL CHRONIC CARE MANAGEMENT (OUTPATIENT)
Dept: PRIMARY CARE CLINIC | Facility: CLINIC | Age: 70
End: 2020-04-30
Payer: MEDICARE

## 2020-04-30 PROCEDURE — 99490 CHRNC CARE MGMT STAFF 1ST 20: CPT | Mod: S$PBB,,, | Performed by: FAMILY MEDICINE

## 2020-04-30 PROCEDURE — 99490 CHRNC CARE MGMT STAFF 1ST 20: CPT | Mod: PBBFAC,PO | Performed by: FAMILY MEDICINE

## 2020-04-30 PROCEDURE — 99490 PR CHRONIC CARE MGMT, 1ST 20 MIN: ICD-10-PCS | Mod: S$PBB,,, | Performed by: FAMILY MEDICINE

## 2020-05-31 ENCOUNTER — EXTERNAL CHRONIC CARE MANAGEMENT (OUTPATIENT)
Dept: PRIMARY CARE CLINIC | Facility: CLINIC | Age: 70
End: 2020-05-31
Payer: MEDICARE

## 2020-05-31 PROCEDURE — 99490 PR CHRONIC CARE MGMT, 1ST 20 MIN: ICD-10-PCS | Mod: S$PBB,,, | Performed by: FAMILY MEDICINE

## 2020-05-31 PROCEDURE — 99490 CHRNC CARE MGMT STAFF 1ST 20: CPT | Mod: S$PBB,,, | Performed by: FAMILY MEDICINE

## 2020-05-31 PROCEDURE — 99490 CHRNC CARE MGMT STAFF 1ST 20: CPT | Mod: PBBFAC,PO | Performed by: FAMILY MEDICINE

## 2020-06-09 ENCOUNTER — TELEPHONE (OUTPATIENT)
Dept: PULMONOLOGY | Facility: CLINIC | Age: 70
End: 2020-06-09

## 2020-06-30 ENCOUNTER — EXTERNAL CHRONIC CARE MANAGEMENT (OUTPATIENT)
Dept: PRIMARY CARE CLINIC | Facility: CLINIC | Age: 70
End: 2020-06-30
Payer: MEDICARE

## 2020-06-30 PROCEDURE — 99490 CHRNC CARE MGMT STAFF 1ST 20: CPT | Mod: PBBFAC,PO | Performed by: FAMILY MEDICINE

## 2020-06-30 PROCEDURE — 99490 CHRNC CARE MGMT STAFF 1ST 20: CPT | Mod: S$PBB,,, | Performed by: FAMILY MEDICINE

## 2020-06-30 PROCEDURE — 99490 PR CHRONIC CARE MGMT, 1ST 20 MIN: ICD-10-PCS | Mod: S$PBB,,, | Performed by: FAMILY MEDICINE

## 2020-07-31 ENCOUNTER — EXTERNAL CHRONIC CARE MANAGEMENT (OUTPATIENT)
Dept: PRIMARY CARE CLINIC | Facility: CLINIC | Age: 70
End: 2020-07-31
Payer: MEDICARE

## 2020-07-31 PROCEDURE — 99490 CHRNC CARE MGMT STAFF 1ST 20: CPT | Mod: S$PBB,,, | Performed by: FAMILY MEDICINE

## 2020-07-31 PROCEDURE — 99490 PR CHRONIC CARE MGMT, 1ST 20 MIN: ICD-10-PCS | Mod: S$PBB,,, | Performed by: FAMILY MEDICINE

## 2020-07-31 PROCEDURE — 99490 CHRNC CARE MGMT STAFF 1ST 20: CPT | Mod: PBBFAC,PO | Performed by: FAMILY MEDICINE

## 2020-08-07 ENCOUNTER — PATIENT MESSAGE (OUTPATIENT)
Dept: FAMILY MEDICINE | Facility: CLINIC | Age: 70
End: 2020-08-07

## 2020-08-11 ENCOUNTER — OFFICE VISIT (OUTPATIENT)
Dept: DERMATOLOGY | Facility: CLINIC | Age: 70
End: 2020-08-11
Payer: MEDICARE

## 2020-08-11 ENCOUNTER — TELEPHONE (OUTPATIENT)
Dept: DERMATOLOGY | Facility: CLINIC | Age: 70
End: 2020-08-11

## 2020-08-11 VITALS — HEIGHT: 68 IN | WEIGHT: 313 LBS | BODY MASS INDEX: 47.44 KG/M2

## 2020-08-11 DIAGNOSIS — D48.5 NEOPLASM OF UNCERTAIN BEHAVIOR OF SKIN: Primary | ICD-10-CM

## 2020-08-11 DIAGNOSIS — Z85.828 PERSONAL HISTORY OF OTHER MALIGNANT NEOPLASM OF SKIN: ICD-10-CM

## 2020-08-11 DIAGNOSIS — Z12.83 SKIN CANCER SCREENING: ICD-10-CM

## 2020-08-11 DIAGNOSIS — L57.0 MULTIPLE ACTINIC KERATOSES: ICD-10-CM

## 2020-08-11 DIAGNOSIS — D22.9 MULTIPLE BENIGN NEVI: ICD-10-CM

## 2020-08-11 DIAGNOSIS — L82.0 SEBORRHEIC KERATOSES, INFLAMED: ICD-10-CM

## 2020-08-11 DIAGNOSIS — L82.1 SEBORRHEIC KERATOSES: ICD-10-CM

## 2020-08-11 PROCEDURE — 17110 PR DESTRUCTION BENIGN LESIONS UP TO 14: ICD-10-PCS | Mod: S$PBB,59,, | Performed by: DERMATOLOGY

## 2020-08-11 PROCEDURE — 99999 PR PBB SHADOW E&M-EST. PATIENT-LVL III: CPT | Mod: PBBFAC,,, | Performed by: DERMATOLOGY

## 2020-08-11 PROCEDURE — 88305 TISSUE EXAM BY PATHOLOGIST: CPT | Mod: 26,,, | Performed by: PATHOLOGY

## 2020-08-11 PROCEDURE — 99999 PR PBB SHADOW E&M-EST. PATIENT-LVL III: ICD-10-PCS | Mod: PBBFAC,,, | Performed by: DERMATOLOGY

## 2020-08-11 PROCEDURE — 17003 DESTRUCT PREMALG LES 2-14: CPT | Mod: S$PBB,,, | Performed by: DERMATOLOGY

## 2020-08-11 PROCEDURE — 17110 DESTRUCTION B9 LES UP TO 14: CPT | Mod: S$PBB,59,, | Performed by: DERMATOLOGY

## 2020-08-11 PROCEDURE — 17000 DESTRUCT PREMALG LESION: CPT | Mod: 59,PBBFAC,PO | Performed by: DERMATOLOGY

## 2020-08-11 PROCEDURE — 17003 DESTRUCTION, PREMALIGNANT LESIONS; SECOND THROUGH 14 LESIONS: ICD-10-PCS | Mod: S$PBB,,, | Performed by: DERMATOLOGY

## 2020-08-11 PROCEDURE — 88305 TISSUE EXAM BY PATHOLOGIST: ICD-10-PCS | Mod: 26,,, | Performed by: PATHOLOGY

## 2020-08-11 PROCEDURE — 88305 TISSUE EXAM BY PATHOLOGIST: CPT | Performed by: PATHOLOGY

## 2020-08-11 PROCEDURE — 99213 PR OFFICE/OUTPT VISIT, EST, LEVL III, 20-29 MIN: ICD-10-PCS | Mod: 25,S$PBB,, | Performed by: DERMATOLOGY

## 2020-08-11 PROCEDURE — 17003 DESTRUCT PREMALG LES 2-14: CPT | Mod: 59,PBBFAC,PO | Performed by: DERMATOLOGY

## 2020-08-11 PROCEDURE — 11102 TANGNTL BX SKIN SINGLE LES: CPT | Mod: S$PBB,,, | Performed by: DERMATOLOGY

## 2020-08-11 PROCEDURE — 17110 DESTRUCTION B9 LES UP TO 14: CPT | Mod: PBBFAC,PO,59 | Performed by: DERMATOLOGY

## 2020-08-11 PROCEDURE — 99213 OFFICE O/P EST LOW 20 MIN: CPT | Mod: 25,S$PBB,, | Performed by: DERMATOLOGY

## 2020-08-11 PROCEDURE — 99213 OFFICE O/P EST LOW 20 MIN: CPT | Mod: PBBFAC,PO,25 | Performed by: DERMATOLOGY

## 2020-08-11 PROCEDURE — 11102 PR TANGENTIAL BIOPSY, SKIN, SINGLE LESION: ICD-10-PCS | Mod: S$PBB,,, | Performed by: DERMATOLOGY

## 2020-08-11 PROCEDURE — 17000 DESTRUCT PREMALG LESION: CPT | Mod: 59,S$PBB,, | Performed by: DERMATOLOGY

## 2020-08-11 PROCEDURE — 17000 PR DESTRUCTION(LASER SURGERY,CRYOSURGERY,CHEMOSURGERY),PREMALIGNANT LESIONS,FIRST LESION: ICD-10-PCS | Mod: 59,S$PBB,, | Performed by: DERMATOLOGY

## 2020-08-11 PROCEDURE — 11102 TANGNTL BX SKIN SINGLE LES: CPT | Mod: PBBFAC,PO | Performed by: DERMATOLOGY

## 2020-08-11 NOTE — TELEPHONE ENCOUNTER
----- Message from Maryam Stiles MD sent at 8/11/2020 10:41 AM CDT -----  Please send 5fu/[facility]ene to [facility]. [facility] bid x 5 days

## 2020-08-11 NOTE — PROGRESS NOTES
Subjective:       Patient ID:  Marvin Chacon Jr. is a 70 y.o. male who presents for   Chief Complaint   Patient presents with    Lesion     69 y/o M present for skin check, lesion on back x years, The patient denies any change, including change in color, increase in size, or spontaneous bleeding, associated with this lesion.        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 Pleased with results. No current treatment. 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   Dr. Mortensen removed a growth from nose 7 years ago  He has had benign moles removed in the past    History AKs s/p cryo  History SCC right arm - [facility] dr. barger   Denies family history of melanoma          Past Medical History:   Diagnosis Date    Arthritis     Bronchitis     Cerebrovascular disease 3/15/2013    LUGO (dyspnea on exertion)     History of colon polyps     Adenomatous polyp of colon (D12.6)    History of nephrolithiasis     History of seasonal allergies     Hyperlipidemia LDL goal < 70     Hypertension     PVD (peripheral vascular disease)     Skin cancer     Sleep apnea     compliant with CPAP    TIA (transient ischemic attack)     Venous insufficiency        Review of Systems   Skin: Negative for rash, daily sunscreen use, activity-related sunscreen use and recent sunburn.        Objective:    Physical Exam   Constitutional: He appears well-developed and well-nourished. He is obese.  No distress.   Eyes: Lids are normal.  No conjunctival no injection.   Cardiovascular: There is no local extremity swelling and no dependent edema.     Neurological: He is alert and oriented to person, place, and time. He is not disoriented.   Psychiatric: He has a normal mood and affect.   Skin:   Areas Examined (abnormalities noted in diagram):   Scalp / Hair Palpated and Inspected  Head / Face Inspection Performed  Neck Inspection Performed  Chest / Axilla Inspection Performed  Abdomen Inspection Performed  Back  Inspection Performed  RUE Inspected  LUE Inspection Performed                   Diagram Legend     Erythematous scaling macule/papule c/w actinic keratosis       Vascular papule c/w angioma      Pigmented verrucoid papule/plaque c/w seborrheic keratosis      Yellow umbilicated papule c/w sebaceous hyperplasia      Irregularly shaped tan macule c/w lentigo     1-2 mm smooth white papules consistent with Milia      Movable subcutaneous cyst with punctum c/w epidermal inclusion cyst      Subcutaneous movable cyst c/w pilar cyst      Firm pink to brown papule c/w dermatofibroma      Pedunculated fleshy papule(s) c/w skin tag(s)      Evenly pigmented macule c/w junctional nevus     Mildly variegated pigmented, slightly irregular-bordered macule c/w mildly atypical nevus      Flesh colored to evenly pigmented papule c/w intradermal nevus       Pink pearly papule/plaque c/w basal cell carcinoma      Erythematous hyperkeratotic cursted plaque c/w SCC      Surgical scar with no sign of skin cancer recurrence      Open and closed comedones      Inflammatory papules and pustules      Verrucoid papule consistent consistent with wart     Erythematous eczematous patches and plaques     Dystrophic onycholytic nail with subungual debris c/w onychomycosis     Umbilicated papule    Erythematous-base heme-crusted tan verrucoid plaque consistent with inflamed seborrheic keratosis     Erythematous Silvery Scaling Plaque c/w Psoriasis     See annotation          Assessment / Plan:      Pathology Orders:     Normal Orders This Visit    Specimen to Pathology, Dermatology     Comments:    Number of Specimens:->1  ------------------------->-------------------------  Spec 1 Procedure:->Biopsy  Spec 1 Clinical Impression:->scc vs other check margins  Spec 1 Source:->Right Arm    Questions:    Procedure Type: Dermatology and skin neoplasms    Number of Specimens: 1    ------------------------: -------------------------    Spec 1 Procedure:  Biopsy    Spec 1 Clinical Impression: scc vs other check margins    Spec 1 Source: Right Arm    Clinical Information: images in chart        Neoplasm of uncertain behavior of skin  -     Specimen to Pathology, Dermatology  Shave biopsy procedure note:    Shave biopsy performed after verbal consent including risk of infection, scar, recurrence, need for additional treatment of site. Area prepped with alcohol, anesthetized with approximately 1.0cc of 1% lidocaine with epinephrine. Lesional tissue shaved with razor blade. Hemostasis achieved with application of aluminum chloride followed by hyfrecation. No complications. Dressing applied. Wound care explained.      Personal history of other malignant neoplasm of skin  Area(s) of previous NMSC evaluated with no signs of recurrence.    Upper body skin examination performed today including at least 6 points as noted in physical examination. Suspicious lesions noted.    Skin cancer screening  Area(s) of previous NMSC evaluated with no signs of recurrence.    Upper body skin examination performed today including at least 6 points as noted in physical examination. Suspicious lesions noted.    Seborrheic keratoses, inflamed  Scalp  Cryosurgery procedure note:    Verbal consent from the patient is obtained. Liquid nitrogen cryosurgery is applied to 3 lesions to produce a freeze injury. The patient is aware that blisters may form and is instructed on wound care with gentle cleansing and use of vaseline ointment to keep moist until healed. The patient is supplied a handout on cryosurgery and is instructed to call if lesions do not completely resolve. Risk of dyspigmentation discussed.       Multiple actinic keratoses  Cryosurgery Procedure Note    Verbal consent from the patient is obtained and the patient is aware of the precancerous quality and need for treatment of these lesions. Liquid nitrogen cryosurgery is applied to the 7 actinic keratoses, as detailed in the physical  exam, to produce a freeze injury. The patient is aware that blisters may form and is instructed on wound care with gentle cleansing and use of vaseline ointment to keep moist until healed. The patient is supplied a handout on cryosurgery and is instructed to call if lesions do not completely resolve. Discussed risk postinflammatory pigmentary changes.     5fu/[facility]cip[facility]ene [facility]. aaa bid x 5 days. Discussed expected irritation, stop if severe. [place] area if not resolved after first round of treatment. Treat single area at a time (right forehead, forearms)    Multiple benign nevi  Discussed ABCDE's of nevi.  Monitor for new mole or moles that are becoming bigger, darker, irritated, or developing irregular borders.       Seborrheic keratoses  Trunk  These are benign inherited growths without a malignant potential. Reassurance given to patient. No treatment is necessary.          confirmed email: [email or web address]    Follow up in about 3 months (around 11/11/2020).

## 2020-08-17 ENCOUNTER — TELEPHONE (OUTPATIENT)
Dept: DERMATOLOGY | Facility: CLINIC | Age: 70
End: 2020-08-17

## 2020-08-17 LAB
FINAL PATHOLOGIC DIAGNOSIS: NORMAL
GROSS: NORMAL

## 2020-08-17 NOTE — TELEPHONE ENCOUNTER
Spoke w/ pt. Informed pt about results and recommendations per provider. pt verbalized understanding.    appt on 11/10/2020

## 2020-08-17 NOTE — TELEPHONE ENCOUNTER
----- Message from Maryam Stiles MD sent at 8/17/2020  1:59 PM CDT -----  Please call pt with results. Lesion was an SCC but appears completely removed with biopsy. F/u with me in 2 months to re-check site.

## 2020-08-31 ENCOUNTER — EXTERNAL CHRONIC CARE MANAGEMENT (OUTPATIENT)
Dept: PRIMARY CARE CLINIC | Facility: CLINIC | Age: 70
End: 2020-08-31
Payer: MEDICARE

## 2020-08-31 PROCEDURE — 99490 CHRNC CARE MGMT STAFF 1ST 20: CPT | Mod: PBBFAC,PO | Performed by: FAMILY MEDICINE

## 2020-08-31 PROCEDURE — 99490 PR CHRONIC CARE MGMT, 1ST 20 MIN: ICD-10-PCS | Mod: S$PBB,,, | Performed by: FAMILY MEDICINE

## 2020-08-31 PROCEDURE — 99490 CHRNC CARE MGMT STAFF 1ST 20: CPT | Mod: S$PBB,,, | Performed by: FAMILY MEDICINE

## 2020-09-08 ENCOUNTER — OFFICE VISIT (OUTPATIENT)
Dept: SLEEP MEDICINE | Facility: CLINIC | Age: 70
End: 2020-09-08
Payer: MEDICARE

## 2020-09-08 ENCOUNTER — LAB VISIT (OUTPATIENT)
Dept: LAB | Facility: HOSPITAL | Age: 70
End: 2020-09-08
Attending: FAMILY MEDICINE
Payer: MEDICARE

## 2020-09-08 ENCOUNTER — CLINICAL SUPPORT (OUTPATIENT)
Dept: FAMILY MEDICINE | Facility: CLINIC | Age: 70
End: 2020-09-08
Payer: MEDICARE

## 2020-09-08 VITALS — SYSTOLIC BLOOD PRESSURE: 121 MMHG | HEART RATE: 58 BPM | DIASTOLIC BLOOD PRESSURE: 63 MMHG

## 2020-09-08 VITALS
RESPIRATION RATE: 20 BRPM | HEIGHT: 68 IN | HEART RATE: 79 BPM | SYSTOLIC BLOOD PRESSURE: 146 MMHG | WEIGHT: 315 LBS | BODY MASS INDEX: 47.74 KG/M2 | OXYGEN SATURATION: 90 % | DIASTOLIC BLOOD PRESSURE: 88 MMHG

## 2020-09-08 DIAGNOSIS — G47.33 OSA TREATED WITH BIPAP: ICD-10-CM

## 2020-09-08 DIAGNOSIS — Z01.30 BP CHECK: Primary | ICD-10-CM

## 2020-09-08 DIAGNOSIS — I10 ESSENTIAL HYPERTENSION: ICD-10-CM

## 2020-09-08 DIAGNOSIS — Z12.5 ENCOUNTER FOR PROSTATE CANCER SCREENING: ICD-10-CM

## 2020-09-08 DIAGNOSIS — R63.5 WEIGHT GAIN: Primary | ICD-10-CM

## 2020-09-08 DIAGNOSIS — E66.01 OBESITY, CLASS III, BMI 40-49.9 (MORBID OBESITY): ICD-10-CM

## 2020-09-08 LAB — COMPLEXED PSA SERPL-MCNC: 0.27 NG/ML (ref 0–4)

## 2020-09-08 PROCEDURE — 99214 OFFICE O/P EST MOD 30 MIN: CPT | Mod: S$PBB,,, | Performed by: NURSE PRACTITIONER

## 2020-09-08 PROCEDURE — 84153 ASSAY OF PSA TOTAL: CPT

## 2020-09-08 PROCEDURE — 99999 PR PBB SHADOW E&M-EST. PATIENT-LVL IV: ICD-10-PCS | Mod: PBBFAC,,, | Performed by: NURSE PRACTITIONER

## 2020-09-08 PROCEDURE — 99214 OFFICE O/P EST MOD 30 MIN: CPT | Mod: PBBFAC,27 | Performed by: NURSE PRACTITIONER

## 2020-09-08 PROCEDURE — 99212 OFFICE O/P EST SF 10 MIN: CPT | Mod: PBBFAC,PO

## 2020-09-08 PROCEDURE — 99999 PR PBB SHADOW E&M-EST. PATIENT-LVL II: CPT | Mod: PBBFAC,,,

## 2020-09-08 PROCEDURE — 36415 COLL VENOUS BLD VENIPUNCTURE: CPT | Mod: PO

## 2020-09-08 PROCEDURE — 99999 PR PBB SHADOW E&M-EST. PATIENT-LVL IV: CPT | Mod: PBBFAC,,, | Performed by: NURSE PRACTITIONER

## 2020-09-08 PROCEDURE — 99214 PR OFFICE/OUTPT VISIT, EST, LEVL IV, 30-39 MIN: ICD-10-PCS | Mod: S$PBB,,, | Performed by: NURSE PRACTITIONER

## 2020-09-08 PROCEDURE — 99999 PR PBB SHADOW E&M-EST. PATIENT-LVL II: ICD-10-PCS | Mod: PBBFAC,,,

## 2020-09-08 PROCEDURE — 80061 LIPID PANEL: CPT

## 2020-09-08 NOTE — PROGRESS NOTES
"Subjective:      Patient ID: Marvin Chacon Jr. is a 70 y.o. male.    Chief Complaint: Sleep Apnea    HPI  Patient presents to the office today for evaluation of BiPAP setting use.  She patient benefits from use in wears nightly.  Pressure setting 17/14 cm water pressure.  His diagnostic AHI 11. 9 events per hour.  He understands need to lose weight. Weight gain 12lb from last year.      Patient Active Problem List   Diagnosis    Phlebitis and thrombophlebitis of unspecified site    Hypertension    History of CVA (cerebrovascular accident)    Hyperlipidemia    Cerebrovascular disease    Hypogonadism male    Erectile disorder due to medical condition in male patient    History of colon polyps    Colon polyps    Family history of colon cancer    Male hypogonadism    ALAN treated with BiPAP    Cubital tunnel syndrome    Bilateral carpal tunnel syndrome    Carpal tunnel syndrome    Chronic edema    Rhinophyma    Behaviorally induced insufficient sleep syndrome    Periodic limb movement disorder (PLMD)    Obesity, Class III, BMI 40-49.9 (morbid obesity)    Colon cancer screening    Special screening for malignant neoplasms, colon    Gastroenteritis    Venous insufficiency    Compression of vein           BP (!) 146/88   Pulse 79   Resp 20   Ht 5' 8" (1.727 m)   Wt (!) 144.4 kg (318 lb 5.5 oz)   SpO2 (!) 90%   BMI 48.40 kg/m²   Body mass index is 48.4 kg/m².    Review of Systems   Constitutional: Positive for weight gain.   HENT: Negative.    Respiratory: Negative.    Cardiovascular: Negative.    Musculoskeletal: Negative.    Gastrointestinal: Negative.    Neurological: Negative.    Psychiatric/Behavioral: Negative.      Objective:      Physical Exam  Constitutional:       Appearance: He is well-developed.   HENT:      Head: Normocephalic and atraumatic.      Mouth/Throat:      Comments: Wearing mask due to COVID-19 protocol  Neck:      Musculoskeletal: Normal range of motion and neck " supple.      Thyroid: No thyroid mass or thyromegaly.      Trachea: Trachea normal.      Comments: Neck circumference:21 inches     Cardiovascular:      Rate and Rhythm: Normal rate and regular rhythm.      Heart sounds: Normal heart sounds.   Pulmonary:      Effort: Pulmonary effort is normal.      Breath sounds: Normal breath sounds. No wheezing, rhonchi or rales.   Chest:      Chest wall: There is no dullness to percussion.   Abdominal:      Palpations: Abdomen is soft. There is no splenomegaly or mass.      Tenderness: There is no abdominal tenderness.   Musculoskeletal: Normal range of motion.   Skin:     General: Skin is warm and dry.   Neurological:      Mental Status: He is alert and oriented to person, place, and time.   Psychiatric:         Mood and Affect: Mood normal.         Behavior: Behavior normal.       Personal Diagnostic Review    BiPAP download shows patient wears on average 7 hrs and 41 minutes. Greater than 4 hrs 100 % of the time. AHI 11    Assessment:       1. ALAN treated with BiPAP    2. Obesity, Class III, BMI 40-49.9 (morbid obesity)        Outpatient Encounter Medications as of 9/8/2020   Medication Sig Dispense Refill    aspirin (ECOTRIN) 81 MG EC tablet Take 81 mg by mouth once daily.      atorvastatin (LIPITOR) 40 MG tablet TAKE 1 TABLET (40 MG TOTAL) BY MOUTH ONCE DAILY. 90 tablet 3    clopidogrel (PLAVIX) 75 mg tablet Take 1 tablet (75 mg total) by mouth once daily. 30 tablet 11    gabapentin (NEURONTIN) 300 MG capsule Take 300 mg by mouth 2 (two) times daily.      irbesartan (AVAPRO) 300 MG tablet Take 1 tablet (300 mg total) by mouth every evening. 90 tablet 3    meloxicam (MOBIC) 15 MG tablet TAKE 1 TABLET EVERY DAY 90 tablet 11    metoprolol tartrate (LOPRESSOR) 25 MG tablet Take 25 mg by mouth 2 (two) times daily.      multivitamin-minerals-lutein (CENTRUM SILVER) Tab Take 1 tablet by mouth once daily. Every day      methylPREDNISolone (MEDROL DOSEPACK) 4 mg tablet  follow package directions 21 tablet 0    metoprolol succinate (TOPROL-XL) 25 MG 24 hr tablet       nitroGLYCERIN (NITROSTAT) 0.4 MG SL tablet Place 0.4 mg under the tongue every 5 (five) minutes as needed.      promethazine (PHENERGAN) 25 MG tablet Take 1 tablet (25 mg total) by mouth every 6 (six) hours as needed for Nausea. 6 tablet 0     No facility-administered encounter medications on file as of 9/8/2020.      No orders of the defined types were placed in this encounter.    Plan:        Problem List Items Addressed This Visit        Endocrine    Obesity, Class III, BMI 40-49.9 (morbid obesity)    Overview     The patient presents with obesity.  Denies bulimia, amenorrhea, cold intolerance, edema, hip pain, hirsutism, knee pain, polydipsia, polyuria, thirst and weakness.  The patient does not perform regular exercise.  Previous treatments for obesity :self-directed dieting without success.  The patient and I discussed the importance of exercise.    Wt Readings from Last 4 Encounters:   04/03/19 (!) 139.3 kg (307 lb)   02/07/19 (!) 137.9 kg (304 lb)   01/03/19 (!) 138.3 kg (304 lb 12.8 oz)   07/26/18 (!) 137.1 kg (302 lb 3.2 oz)                      Current Assessment & Plan     Weight loss and exercise to improve overall health.              Other    ALAN treated with BiPAP    Overview     /hr. BIPAP 17/14. DME [facility]             Compliant with PAP and benefits from use. Follow up annually in the sleep clinic.

## 2020-09-09 LAB
CHOLEST SERPL-MCNC: 108 MG/DL (ref 120–199)
CHOLEST/HDLC SERPL: 2.4 {RATIO} (ref 2–5)
HDLC SERPL-MCNC: 45 MG/DL (ref 40–75)
HDLC SERPL: 41.7 % (ref 20–50)
LDLC SERPL CALC-MCNC: 42 MG/DL (ref 63–159)
NONHDLC SERPL-MCNC: 63 MG/DL
TRIGL SERPL-MCNC: 105 MG/DL (ref 30–150)

## 2020-09-10 NOTE — PROGRESS NOTES
-I have reviewed the lipid (cholesterol) profile and it appears to be acceptable and is within the target levels that need to be achieved.  Please fill the meds that are currently being used to treat the lipids for 12 months.  I will alert my nurse to book a lipid and liver profile to be done 6 months from now and send you an appointment for this.  If you cannot make the appointment that is sent to you, please call us to rebook this for a more convenient time.  A prescription refill for 12 months of your cholesterol medicine will be sent to your pharmacy but you need to keep up with when your labs tests are due.  You can review this on your [employer] site.    Please work hard on your diet, increase your activity to try to keep your weight at an appropriate weight for your height.  If you are not achieving your goals, consider seeing a dietitian to help you get your calories in line and start a walking program to exercise 30 minutes daily.      The current value for the PSA is considered normal.  Please recheck this in 1 year.

## 2020-09-16 ENCOUNTER — TELEPHONE (OUTPATIENT)
Dept: FAMILY MEDICINE | Facility: CLINIC | Age: 70
End: 2020-09-16

## 2020-09-16 NOTE — TELEPHONE ENCOUNTER
----- Message from Nila Reis sent at 9/16/2020  8:09 AM CDT -----  .Type:  Needs Medical Advice    Who Called: COMFORT PIERSON JR.  Symptoms (please be specific):   How long has patient had these symptoms:    Pharmacy name and phone #:     Would the patient rather a call back or a response via [facility]?  Both   Best Call Back Number:  [phone]  Additional Information:    Pt is requesting a call back from the nurse in regards to the pt knowing if his labs from 09/08/2020 has been sent to Dr paige office please pt is at Dr paige office now

## 2020-09-30 ENCOUNTER — EXTERNAL CHRONIC CARE MANAGEMENT (OUTPATIENT)
Dept: PRIMARY CARE CLINIC | Facility: CLINIC | Age: 70
End: 2020-09-30
Payer: MEDICARE

## 2020-09-30 PROCEDURE — 99490 CHRNC CARE MGMT STAFF 1ST 20: CPT | Mod: PBBFAC,PO | Performed by: FAMILY MEDICINE

## 2020-09-30 PROCEDURE — 99490 PR CHRONIC CARE MGMT, 1ST 20 MIN: ICD-10-PCS | Mod: S$PBB,,, | Performed by: FAMILY MEDICINE

## 2020-09-30 PROCEDURE — 99490 CHRNC CARE MGMT STAFF 1ST 20: CPT | Mod: S$PBB,,, | Performed by: FAMILY MEDICINE

## 2020-10-20 ENCOUNTER — PATIENT MESSAGE (OUTPATIENT)
Dept: FAMILY MEDICINE | Facility: CLINIC | Age: 70
End: 2020-10-20

## 2020-10-21 ENCOUNTER — PATIENT OUTREACH (OUTPATIENT)
Dept: OTHER | Facility: OTHER | Age: 70
End: 2020-10-21

## 2020-10-24 ENCOUNTER — PATIENT MESSAGE (OUTPATIENT)
Dept: OTHER | Facility: OTHER | Age: 70
End: 2020-10-24

## 2020-10-24 ENCOUNTER — PATIENT MESSAGE (OUTPATIENT)
Dept: ADMINISTRATIVE | Facility: OTHER | Age: 70
End: 2020-10-24

## 2020-10-26 ENCOUNTER — PATIENT OUTREACH (OUTPATIENT)
Dept: OTHER | Facility: OTHER | Age: 70
End: 2020-10-26

## 2020-10-26 RX ORDER — METOPROLOL TARTRATE 50 MG/1
50 TABLET ORAL 2 TIMES DAILY
Qty: 60 TABLET | Refills: 11 | Status: SHIPPED | OUTPATIENT
Start: 2020-10-26 | End: 2021-03-17

## 2020-10-26 NOTE — PROGRESS NOTES
[facility] for enrollment and to address high BP alert. Last BP received was 186/95.    Per chart review, patient has been comparing ihealth monitor with [employer] and getting higher numbers on iHealth. Need to confirm technique and if still getting diff BP readings on both machine    Confirm which metoprolol patient is on     Hypertension Medications             irbesartan (AVAPRO) 300 MG tablet Take 1 tablet (300 mg total) by mouth every evening.    metoprolol succinate (TOPROL-XL) 25 MG 24 hr tablet     metoprolol tartrate (LOPRESSOR) 25 MG tablet Take 25 mg by mouth 2 (two) times daily.    nitroGLYCERIN (NITROSTAT) 0.4 MG SL tablet Place 0.4 mg under the tongue every 5 (five) minutes as needed.        Last 5 Patient Entered Readings                                      Current 30 Day Average: 174/87     Recent Readings 10/24/2020 10/24/2020 10/24/2020 10/24/2020 10/24/2020    SBP (mmHg) - 186 186  - 155    DBP (mmHg) - 95 95 - 79    Pulse 67 67 - 63 63

## 2020-10-26 NOTE — TELEPHONE ENCOUNTER
I change his blood pressure medicine to 50 mg Lopressor twice a day.  Please book an appoint with Penelope in two weeks to recheck the blood pressure and adjust medications as needed.      I have signed for the following orders AND/OR meds.  Please call the patient and ask the patient to schedule the testing AND/OR inform about any medications that were sent.      No orders of the defined types were placed in this encounter.      Medications Ordered This Encounter   Medications    metoprolol tartrate (LOPRESSOR) 50 MG tablet     Sig: Take 1 tablet (50 mg total) by mouth 2 (two) times daily.     Dispense:  60 tablet     Refill:  11     .

## 2020-10-28 NOTE — PROGRESS NOTES
Digital Medicine: Clinician Introduction    Marvin Chacon Jr. is a 70 y.o. male who is newly enrolled in the Digital Medicine Clinic.    Spoke to patient to address BP alert and complete enrollment call - reports experiencing pain in leg today from vein procedure yesterday but denies severe HA or CP. Does report SOB x 6 weeks now which he is has appt 10/30/20 to have it evaluated. Contributes SOB to weight currently.     Started using DigMed machine last week. States he has been having a hard time using our machine like putting on the cuff by himself and often gest error messages stating cuff is too tight or too lose. Has another BP machine which gives lower readings which he is concerned about the accuracy of DigMed machine.    BP check are close to when he drinks coffee in the morning. Wonders when is the best time to check BP in relation to coffee and BP meds. Agrees to check in the evenings moving forward. Admits he is not usually as relaxed when checking BP and gets frustrated when not able to put on cuff correctly.     Yesterday he had vein procedure by Dr. Pineda, nurse took /98 pulse 82 (?). They went ahead with procedure - related the high BP to anxiety related to procedure. Rechecked BP after procedure 144/82 pulse 62.    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 Dr. Pineda gave him Xanax for anxiety but did not take it.     Wants to start walking and lose weight around after he recovers from vein procedure. He smoked for 10 years and quit.       The history is provided by the patient.      Review of patient's allergies indicates:   -- No known drug allergies   Completed Medication Reconciliation      HYPERTENSION  Explained hypertension digital medicine goals including BP goal less than or equal to 130/80mmHg, improved convenience of BP management and reduced risk of heart attack, kidney failure,  stroke, eye disease, dementia, and death.     Explained non-pharmacologic therapies like low salt diet and physical activity can reduce blood pressure.       Explained that we expect patient to submit several blood pressure readings per week at random times of the day, but at least 30 minutes after taking blood pressure medications. Instructed patient not to allow anyone else to use their blood pressure monitor and phone as data submitted is directly entered into medical record. Reviewed and confirmed appropriate blood pressure monitoring technique.         Reviewed signs/symptoms of hypertension (headache, changes in vision, chest pain, shortness of breath)   Reviewed signs/symptoms of hypotension (lightheaded, dizziness, weakness)     Patient's BP goal is less than or equal to 130/80. Patients BP average is 167/86 mmHg, which is above goal, per 2017 ACC/AHA Hypertension Guidelines.         Last 5 Patient Entered Readings                                      Current 30 Day Average: 167/86     Recent Readings 10/28/2020 10/28/2020 10/28/2020 10/27/2020 10/27/2020    SBP (mmHg) - 157 157 - 165    DBP (mmHg) - 94 94 - 69    Pulse 59 59 - 86 86                Depression Screening  Marvin Chacon Jr. screened negative on the depression screening.     Sleep Apnea Screening  Patient previously diagnosed with ALAN and is not interested in a referral at this time.     He reports he is currently using CPAP ALAN is managed effectively with CPAP use.  on BiPAP. Recently saw pulmonology [facility].     Medication Affordability Screening  Patient did not answer the medication affordability questionnaires. Patient is currently not having problems affording medications    Medication Adherence-Medication adherence was assessed.  Patient continue taking medication as prescribed.          No missed doses recently. Does not use formal reminder system - taking meds is apart of his routine       ASSESSMENT(S)  Patients BP average is 167/86  mmHg, which is above goal. Patient's BP goal is less than or equal to 130/80.     Avg BP elevated - inaccurate technique and concerns with BP machine accuracy may contribute to high reading. He is appropriately managed on first line BP agent ARB and cardioselective BB.       Hypertension Plan  Additional monitoring needed. Patient agreeable to checking BP in the evenings to ensure accuracy and proper technique. He will bring in BP machine to  10/30/20 for direct comparison and to help with his BP technique.   Continue current therapy.  Continue current diet/physical activity routine.  Instructed to charge device.  Tech support needed. Patient concerned that DigMed cuff is giving higher readings than home BP machine.   Plan to follow up in 1 month.      Addressed patient questions and patient has my contact information if needed prior to next outreach. Patient verbalizes understanding.      Explained the importance of self-monitoring and medication adherence. Encouraged the patient to communicate with their health  for lifestyle modifications to help improve or maintain a healthy lifestyle.        Sent link to [facility] webpages and my contact information via [employer] for future questions.        Explained to the patient that the Digital Medicine team is not available for emergencies. Advised patient call [facility] ([phone] or [phone]) or [phone] if needed.            There are no preventive care reminders to display for this patient.       Current Medication Regimen:  Hypertension Medications             irbesartan (AVAPRO) 300 MG tablet Take 1 tablet (300 mg total) by mouth every evening.    metoprolol tartrate (LOPRESSOR) 50 MG tablet Take 1 tablet (50 mg total) by mouth 2 (two) times daily.

## 2020-10-30 ENCOUNTER — OFFICE VISIT (OUTPATIENT)
Dept: FAMILY MEDICINE | Facility: CLINIC | Age: 70
End: 2020-10-30
Payer: MEDICARE

## 2020-10-30 ENCOUNTER — HOSPITAL ENCOUNTER (OUTPATIENT)
Dept: RADIOLOGY | Facility: HOSPITAL | Age: 70
Discharge: HOME | End: 2020-10-30
Attending: NURSE PRACTITIONER
Payer: MEDICARE

## 2020-10-30 VITALS
WEIGHT: 315 LBS | HEIGHT: 68 IN | SYSTOLIC BLOOD PRESSURE: 134 MMHG | DIASTOLIC BLOOD PRESSURE: 76 MMHG | TEMPERATURE: 98 F | BODY MASS INDEX: 47.74 KG/M2 | HEART RATE: 54 BPM

## 2020-10-30 DIAGNOSIS — R06.09 DYSPNEA ON EXERTION: Primary | ICD-10-CM

## 2020-10-30 DIAGNOSIS — R06.09 DYSPNEA ON EXERTION: ICD-10-CM

## 2020-10-30 PROCEDURE — 99214 OFFICE O/P EST MOD 30 MIN: CPT | Mod: S$PBB,,, | Performed by: NURSE PRACTITIONER

## 2020-10-30 PROCEDURE — 99999 PR PBB SHADOW E&M-EST. PATIENT-LVL III: CPT | Mod: PBBFAC,,, | Performed by: NURSE PRACTITIONER

## 2020-10-30 PROCEDURE — 99213 OFFICE O/P EST LOW 20 MIN: CPT | Mod: PBBFAC,25,PO | Performed by: NURSE PRACTITIONER

## 2020-10-30 PROCEDURE — 99214 PR OFFICE/OUTPT VISIT, EST, LEVL IV, 30-39 MIN: ICD-10-PCS | Mod: S$PBB,,, | Performed by: NURSE PRACTITIONER

## 2020-10-30 PROCEDURE — 99999 PR PBB SHADOW E&M-EST. PATIENT-LVL III: ICD-10-PCS | Mod: PBBFAC,,, | Performed by: NURSE PRACTITIONER

## 2020-10-30 PROCEDURE — 71046 XR CHEST PA AND LATERAL: ICD-10-PCS | Mod: 26,,, | Performed by: RADIOLOGY

## 2020-10-30 PROCEDURE — 71046 X-RAY EXAM CHEST 2 VIEWS: CPT | Mod: 26,,, | Performed by: RADIOLOGY

## 2020-10-30 PROCEDURE — 71046 X-RAY EXAM CHEST 2 VIEWS: CPT | Mod: TC,PO

## 2020-10-30 RX ORDER — ALPRAZOLAM 1 MG/1
TABLET ORAL
COMMUNITY
Start: 2020-09-16 | End: 2021-02-05 | Stop reason: ALTCHOICE

## 2020-10-30 NOTE — PROGRESS NOTES
Subjective:       Patient ID: Marvin Chacon Jr. is a 70 y.o. male.    Chief Complaint: Shortness of Breath (exertion)    Shortness of Breath  This is a chronic problem. The current episode started more than 1 month ago. The problem occurs daily. The problem has been waxing and waning. Associated symptoms include orthopnea. Pertinent negatives include no abdominal pain, chest pain, ear pain, fever, headaches, rash, sore throat or vomiting. The symptoms are aggravated by any activity. He has tried nothing for the symptoms.   He is currently using Bipap for ALAN, he is not comfortable using his machine and has been taking it off at night.     Review of Systems   Constitutional: Negative for fatigue, fever and unexpected weight change.   HENT: Negative for ear pain and sore throat.    Eyes: Negative.  Negative for pain and visual disturbance.   Respiratory: Negative for cough and shortness of breath.    Cardiovascular: Positive for orthopnea. Negative for chest pain and palpitations.   Gastrointestinal: Negative for abdominal pain, diarrhea, nausea and vomiting.   Genitourinary: Negative for dysuria and frequency.   Musculoskeletal: Negative for arthralgias and myalgias.   Skin: Negative for color change and rash.   Neurological: Negative for dizziness and headaches.   Psychiatric/Behavioral: Negative for sleep disturbance. The patient is not nervous/anxious.        Vitals:    10/30/20 0825   BP: 134/76   Pulse: (!) 54   Temp: 97.7 °F (36.5 °C)       Objective:     Current Outpatient Medications   Medication Sig Dispense Refill    aspirin (ECOTRIN) 81 MG EC tablet Take 81 mg by mouth once daily.      atorvastatin (LIPITOR) 40 MG tablet TAKE 1 TABLET (40 MG TOTAL) BY MOUTH ONCE DAILY. 90 tablet 3    clopidogrel (PLAVIX) 75 mg tablet Take 1 tablet (75 mg total) by mouth once daily. 30 tablet 11    gabapentin (NEURONTIN) 300 MG capsule Take 300 mg by mouth 2 (two) times daily.      irbesartan (AVAPRO) 300 MG tablet  Take 1 tablet (300 mg total) by mouth every evening. 90 tablet 3    meloxicam (MOBIC) 15 MG tablet TAKE 1 TABLET EVERY DAY 90 tablet 11    metoprolol tartrate (LOPRESSOR) 50 MG tablet Take 1 tablet (50 mg total) by mouth 2 (two) times daily. 60 tablet 11    multivitamin-minerals-lutein (CENTRUM SILVER) Tab Take 1 tablet by mouth once daily. Every day      ALPRAZolam (XANAX) 1 MG tablet take ONE TABLET TWO hours prior to procedure, then take ONE-HALF TABLET 30 minutes prior to procedure       No current facility-administered medications for this visit.        Physical Exam  Vitals signs and nursing note reviewed.   Constitutional:       General: He is not in acute distress.     Appearance: He is well-developed.   HENT:      Head: Normocephalic and atraumatic.   Eyes:      Pupils: Pupils are equal, round, and reactive to light.   Neck:      Musculoskeletal: Normal range of motion and neck supple.   Cardiovascular:      Rate and Rhythm: Normal rate and regular rhythm.   Pulmonary:      Effort: Pulmonary effort is normal.      Breath sounds: Normal breath sounds.   Musculoskeletal: Normal range of motion.   Skin:     General: Skin is warm and dry.      Findings: No rash.   Neurological:      Mental Status: He is alert and oriented to person, place, and time.   Psychiatric:         Thought Content: Thought content normal.         He was ambulated over 100 feet while wearing pulse ox, O2 sat decreased to 86% with ambulation and pt experienced shortness of breath    Assessment:       1. Dyspnea on exertion        Plan:   Dyspnea on exertion  -     X-Ray Chest PA And Lateral; Future; Expected date: 10/30/2020  -     Complete PFT with bronchodilator; Future  -     PULSE OXIMETRY WITH REST - PULM; Future  -     Ambulatory referral/consult to Pulmonology; Future; Expected date: 12/04/2020        No follow-ups on file.    There are no Patient Instructions on file for this visit.

## 2020-10-31 ENCOUNTER — EXTERNAL CHRONIC CARE MANAGEMENT (OUTPATIENT)
Dept: PRIMARY CARE CLINIC | Facility: CLINIC | Age: 70
End: 2020-10-31
Payer: MEDICARE

## 2020-10-31 PROCEDURE — 99457 RPM TX MGMT 1ST 20 MIN: CPT | Mod: S$PBB,,, | Performed by: FAMILY MEDICINE

## 2020-10-31 PROCEDURE — 99490 CHRNC CARE MGMT STAFF 1ST 20: CPT | Mod: PBBFAC,PO | Performed by: FAMILY MEDICINE

## 2020-10-31 PROCEDURE — 99490 PR CHRONIC CARE MGMT, 1ST 20 MIN: ICD-10-PCS | Mod: S$PBB,,, | Performed by: FAMILY MEDICINE

## 2020-10-31 PROCEDURE — 99457 PR MONITORING, PHYSIOL PARAM, REMOTE, 1ST 20 MINS, PER MONTH: ICD-10-PCS | Mod: S$PBB,,, | Performed by: FAMILY MEDICINE

## 2020-10-31 PROCEDURE — 99490 CHRNC CARE MGMT STAFF 1ST 20: CPT | Mod: S$PBB,,, | Performed by: FAMILY MEDICINE

## 2020-11-10 ENCOUNTER — OFFICE VISIT (OUTPATIENT)
Dept: DERMATOLOGY | Facility: CLINIC | Age: 70
End: 2020-11-10
Payer: MEDICARE

## 2020-11-10 VITALS — WEIGHT: 315 LBS | BODY MASS INDEX: 47.74 KG/M2 | RESPIRATION RATE: 18 BRPM | HEIGHT: 68 IN

## 2020-11-10 DIAGNOSIS — L82.0 INFLAMED SEBORRHEIC KERATOSIS: ICD-10-CM

## 2020-11-10 DIAGNOSIS — Z12.83 SKIN CANCER SCREENING: ICD-10-CM

## 2020-11-10 DIAGNOSIS — L90.5 SCAR: ICD-10-CM

## 2020-11-10 DIAGNOSIS — L57.0 ACTINIC KERATOSES: Primary | ICD-10-CM

## 2020-11-10 DIAGNOSIS — D18.01 CHERRY ANGIOMA: ICD-10-CM

## 2020-11-10 PROCEDURE — 17000 DESTRUCT PREMALG LESION: CPT | Mod: 59,S$PBB,, | Performed by: DERMATOLOGY

## 2020-11-10 PROCEDURE — 99213 OFFICE O/P EST LOW 20 MIN: CPT | Mod: 25,S$PBB,, | Performed by: DERMATOLOGY

## 2020-11-10 PROCEDURE — 99213 OFFICE O/P EST LOW 20 MIN: CPT | Mod: PBBFAC,PO | Performed by: DERMATOLOGY

## 2020-11-10 PROCEDURE — 17003 DESTRUCT PREMALG LES 2-14: CPT | Mod: 59,PBBFAC,PO | Performed by: DERMATOLOGY

## 2020-11-10 PROCEDURE — 99213 PR OFFICE/OUTPT VISIT, EST, LEVL III, 20-29 MIN: ICD-10-PCS | Mod: 25,S$PBB,, | Performed by: DERMATOLOGY

## 2020-11-10 PROCEDURE — 99999 PR PBB SHADOW E&M-EST. PATIENT-LVL III: CPT | Mod: PBBFAC,,, | Performed by: DERMATOLOGY

## 2020-11-10 PROCEDURE — 17003 DESTRUCT PREMALG LES 2-14: CPT | Mod: 59,S$PBB,, | Performed by: DERMATOLOGY

## 2020-11-10 PROCEDURE — 99999 PR PBB SHADOW E&M-EST. PATIENT-LVL III: ICD-10-PCS | Mod: PBBFAC,,, | Performed by: DERMATOLOGY

## 2020-11-10 PROCEDURE — 17000 DESTRUCT PREMALG LESION: CPT | Mod: 59,PBBFAC,PO | Performed by: DERMATOLOGY

## 2020-11-10 PROCEDURE — 17003 DESTRUCTION, PREMALIGNANT LESIONS; SECOND THROUGH 14 LESIONS: ICD-10-PCS | Mod: 59,S$PBB,, | Performed by: DERMATOLOGY

## 2020-11-10 PROCEDURE — 17000 PR DESTRUCTION(LASER SURGERY,CRYOSURGERY,CHEMOSURGERY),PREMALIGNANT LESIONS,FIRST LESION: ICD-10-PCS | Mod: 59,S$PBB,, | Performed by: DERMATOLOGY

## 2020-11-10 NOTE — PROGRESS NOTES
"  Subjective:       Patient ID:  Marvin Chacon Jr. is a 70 y.o. male who presents for   Chief Complaint   Patient presents with    Skin Check     Patient present for FBS, last seen on 8/11/2020. Biopsy SCC at last visit, negative margins  No issues with healing      C/o lesions to face and L arm x unsure. The patient denies any change, including change in color, increase in size, or spontaneous bleeding, associated with this lesion.  Not treating.  Given 5fu skin medicinals , treated arm scalp,. Improved    Diagnosis 1.  Skin, right arm, shave biopsy:   - INVASIVE SQUAMOUS CELL CARCINOMA.   - MARGINS ARE NEGATIVE IN THE PLANES OF SECTION.   MICROSCOPIC DESCRIPTION: Sections show a proliferation of squamous cells   exhibiting atypia and infiltrating within the dermis.   Comment: Interpreted by: Leora Pardo M.D., Signed on 08/17/2020 at 13:26  Gross Patient ID/ Pathology ID:  [id number]   Received in formalin, labeled "right arm," is a firm 13 x 11 x 3 mm tan-white   to tan-brown       yes Phx of NMSC.  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 barger   + SCC R arm (2020)  no Fhx of melanoma.    Past Medical History:  No date: Arthritis  No date: Bronchitis  3/15/2013: Cerebrovascular disease  No date: LUGO (dyspnea on exertion)  No date: History of colon polyps      Comment:  Adenomatous polyp of colon (D12.6)  No date: History of nephrolithiasis  No date: History of seasonal allergies  No date: Hyperlipidemia LDL goal < 70  No date: Hypertension  No date: PVD (peripheral vascular disease)  No date: Skin cancer  No date: Sleep apnea      Comment:  compliant with CPAP  No date: TIA (transient ischemic attack)  No date: Venous insufficiency      Review of Systems   Constitutional: Negative for fever, chills, weight loss, fatigue, night sweats and malaise.   HENT: Negative for headaches.    Respiratory: Negative for cough and shortness of breath.    Gastrointestinal: Negative for indigestion.   Skin: Negative for rash, daily " sunscreen use, activity-related sunscreen use, recent sunburn and wears hat.   Neurological: Negative for headaches.   Hematologic/Lymphatic: Negative for adenopathy. Does not bruise/bleed easily.        Objective:    Physical Exam   Constitutional: He appears well-developed and well-nourished. He is obese.  No distress.   Eyes: Lids are normal.  No conjunctival no injection.   Cardiovascular: There is no local extremity swelling and no dependent edema.     Neurological: He is alert and oriented to person, place, and time. He is not disoriented.   Psychiatric: He has a normal mood and affect.   Skin:   Areas Examined (abnormalities noted in diagram):   Scalp / Hair Palpated and Inspected  Head / Face Inspection Performed  Neck Inspection Performed  Chest / Axilla Inspection Performed  Abdomen Inspection Performed  Back Inspection Performed  RUE Inspected  LUE Inspection Performed                   Diagram Legend     Erythematous scaling macule/papule c/w actinic keratosis       Vascular papule c/w angioma      Pigmented verrucoid papule/plaque c/w seborrheic keratosis      Yellow umbilicated papule c/w sebaceous hyperplasia      Irregularly shaped tan macule c/w lentigo     1-2 mm smooth white papules consistent with Milia      Movable subcutaneous cyst with punctum c/w epidermal inclusion cyst      Subcutaneous movable cyst c/w pilar cyst      Firm pink to brown papule c/w dermatofibroma      Pedunculated fleshy papule(s) c/w skin tag(s)      Evenly pigmented macule c/w junctional nevus     Mildly variegated pigmented, slightly irregular-bordered macule c/w mildly atypical nevus      Flesh colored to evenly pigmented papule c/w intradermal nevus       Pink pearly papule/plaque c/w basal cell carcinoma      Erythematous hyperkeratotic cursted plaque c/w SCC      Surgical scar with no sign of skin cancer recurrence      Open and closed comedones      Inflammatory papules and pustules      Verrucoid papule consistent  consistent with wart     Erythematous eczematous patches and plaques     Dystrophic onycholytic nail with subungual debris c/w onychomycosis     Umbilicated papule    Erythematous-base heme-crusted tan verrucoid plaque consistent with inflamed seborrheic keratosis     Erythematous Silvery Scaling Plaque c/w Psoriasis     See annotation      Assessment / Plan:        Actinic keratoses  Cryosurgery Procedure Note    Verbal consent from the patient is obtained and the patient is aware of the precancerous quality and need for treatment of these lesions. Liquid nitrogen cryosurgery is applied to the 12 actinic keratoses, as detailed in the physical exam, to produce a freeze injury. The patient is aware that blisters may form and is instructed on wound care with gentle cleansing and use of vaseline ointment to keep moist until healed. The patient is supplied a handout on cryosurgery and is instructed to call if lesions do not completely resolve. Discussed risk postinflammatory pigmentary changes.       Skin cancer screening  Area(s) of previous NMSC evaluated with no signs of recurrence.  No lesions suspicious for malignancy noted.      Scar, right arm  Biopsy proven SCC, negative margins  NER    Inflamed seborrheic keratosis, scalp  Cryosurgery procedure note:    Verbal consent from the patient is obtained. Liquid nitrogen cryosurgery is applied to 1 lesions to produce a freeze injury. The patient is aware that blisters may form and is instructed on wound care with gentle cleansing and use of vaseline ointment to keep moist until healed. The patient is supplied a handout on cryosurgery and is instructed to call if lesions do not completely resolve. Risk of dyspigmentation discussed.       Redd angioma, scalp  This is a benign vascular lesion. Reassurance given. No treatment required.                Follow up in about 4 months (around 3/10/2021).

## 2020-11-25 ENCOUNTER — PATIENT MESSAGE (OUTPATIENT)
Dept: FAMILY MEDICINE | Facility: CLINIC | Age: 70
End: 2020-11-25

## 2020-11-25 ENCOUNTER — PATIENT OUTREACH (OUTPATIENT)
Dept: OTHER | Facility: OTHER | Age: 70
End: 2020-11-25

## 2020-11-30 ENCOUNTER — EXTERNAL CHRONIC CARE MANAGEMENT (OUTPATIENT)
Dept: PRIMARY CARE CLINIC | Facility: CLINIC | Age: 70
End: 2020-11-30
Payer: MEDICARE

## 2020-11-30 ENCOUNTER — PATIENT OUTREACH (OUTPATIENT)
Dept: OTHER | Facility: OTHER | Age: 70
End: 2020-11-30

## 2020-11-30 PROCEDURE — 99490 PR CHRONIC CARE MGMT, 1ST 20 MIN: ICD-10-PCS | Mod: S$PBB,,, | Performed by: FAMILY MEDICINE

## 2020-11-30 PROCEDURE — G2058 PR CHRON CARE MGMT, EA ADDTL 20 MINS: ICD-10-PCS | Mod: S$PBB,,, | Performed by: FAMILY MEDICINE

## 2020-11-30 PROCEDURE — G2058 CCM ADD 20MIN: HCPCS | Mod: S$PBB,,, | Performed by: FAMILY MEDICINE

## 2020-11-30 PROCEDURE — 99490 CHRNC CARE MGMT STAFF 1ST 20: CPT | Mod: PBBFAC,PO | Performed by: FAMILY MEDICINE

## 2020-11-30 PROCEDURE — 99490 CHRNC CARE MGMT STAFF 1ST 20: CPT | Mod: S$PBB,,, | Performed by: FAMILY MEDICINE

## 2020-11-30 PROCEDURE — G2058 CCM ADD 20MIN: HCPCS | Mod: PBBFAC,PO | Performed by: FAMILY MEDICINE

## 2020-12-16 NOTE — PROGRESS NOTES
Digital Medicine: Health  Follow-Up    The history is provided by the patient.             Reason for review: Blood pressure not at goal    Patient needs assistance troubleshooting: patient reminder needed.      Topics Covered on Call: sending in readings.     Additional Follow-up details: Patient stated that he has had a recent procedure for his veins, and has been back and forth from [place] to [place]. The patient is moving to [facility]. Informed the patient that once he moves, and is no longer under the care of [facility], he will have to be removed from the program. The patient was receptive, and said that he understood and figured that.     Encouraged the patient to continue taking readings until that time, and to send in some recent readings for us. The patient was receptive. He will take new readings and I will follow up with the patient in two weeks before his trip to [place].                 Diet-Not assessed          Physical Activity-Not assessed    Medication Adherence-Medication Adherence not addressed.      Substance, Sleep, Stress-Not assessed      Additional monitoring needed.       Addressed patient questions and patient has my contact information if needed prior to next outreach. Patient verbalizes understanding.      Explained the importance of self-monitoring and medication adherence. Encouraged the patient to communicate with their health  for lifestyle modifications to help improve or maintain a healthy lifestyle.               There are no preventive care reminders to display for this patient.      Last 5 Patient Entered Readings                                      Current 30 Day Average:      Recent Readings 10/28/2020 10/28/2020 10/28/2020 10/27/2020 10/27/2020    SBP (mmHg) - 157 157 - 165    DBP (mmHg) - 94 94 - 69    Pulse 59 59 - 86 86

## 2020-12-31 ENCOUNTER — EXTERNAL CHRONIC CARE MANAGEMENT (OUTPATIENT)
Dept: PRIMARY CARE CLINIC | Facility: CLINIC | Age: 70
End: 2020-12-31
Payer: MEDICARE

## 2020-12-31 PROCEDURE — 99490 PR CHRONIC CARE MGMT, 1ST 20 MIN: ICD-10-PCS | Mod: S$PBB,,, | Performed by: FAMILY MEDICINE

## 2020-12-31 PROCEDURE — 99490 CHRNC CARE MGMT STAFF 1ST 20: CPT | Mod: S$PBB,,, | Performed by: FAMILY MEDICINE

## 2020-12-31 PROCEDURE — 99490 CHRNC CARE MGMT STAFF 1ST 20: CPT | Mod: PBBFAC,PO | Performed by: FAMILY MEDICINE

## 2021-01-19 DIAGNOSIS — R06.02 SOB (SHORTNESS OF BREATH): Primary | ICD-10-CM

## 2021-01-20 ENCOUNTER — HOSPITAL ENCOUNTER (OUTPATIENT)
Dept: RADIOLOGY | Facility: HOSPITAL | Age: 71
Discharge: HOME | End: 2021-01-20
Attending: INTERNAL MEDICINE
Payer: MEDICARE

## 2021-01-20 ENCOUNTER — OFFICE VISIT (OUTPATIENT)
Dept: PULMONOLOGY | Facility: CLINIC | Age: 71
End: 2021-01-20
Payer: MEDICARE

## 2021-01-20 VITALS
DIASTOLIC BLOOD PRESSURE: 86 MMHG | OXYGEN SATURATION: 93 % | SYSTOLIC BLOOD PRESSURE: 140 MMHG | WEIGHT: 311.94 LBS | HEART RATE: 63 BPM | HEIGHT: 68 IN | BODY MASS INDEX: 47.28 KG/M2 | RESPIRATION RATE: 19 BRPM

## 2021-01-20 DIAGNOSIS — R07.9 ACUTE CHEST PAIN: ICD-10-CM

## 2021-01-20 DIAGNOSIS — G47.34 NOCTURNAL HYPOXEMIA: ICD-10-CM

## 2021-01-20 DIAGNOSIS — R06.02 SOB (SHORTNESS OF BREATH): ICD-10-CM

## 2021-01-20 DIAGNOSIS — J45.20 MILD INTERMITTENT ASTHMA, UNSPECIFIED WHETHER COMPLICATED: ICD-10-CM

## 2021-01-20 DIAGNOSIS — R06.09 DYSPNEA ON EXERTION: ICD-10-CM

## 2021-01-20 DIAGNOSIS — G47.33 OSA TREATED WITH BIPAP: Primary | ICD-10-CM

## 2021-01-20 PROCEDURE — 99215 OFFICE O/P EST HI 40 MIN: CPT | Mod: PBBFAC,25 | Performed by: INTERNAL MEDICINE

## 2021-01-20 PROCEDURE — 71046 XR CHEST PA AND LATERAL: ICD-10-PCS | Mod: 26,,, | Performed by: RADIOLOGY

## 2021-01-20 PROCEDURE — 71046 X-RAY EXAM CHEST 2 VIEWS: CPT | Mod: TC

## 2021-01-20 PROCEDURE — 99999 PR PBB SHADOW E&M-EST. PATIENT-LVL V: ICD-10-PCS | Mod: PBBFAC,,, | Performed by: INTERNAL MEDICINE

## 2021-01-20 PROCEDURE — 99215 PR OFFICE/OUTPT VISIT, EST, LEVL V, 40-54 MIN: ICD-10-PCS | Mod: S$PBB,,, | Performed by: INTERNAL MEDICINE

## 2021-01-20 PROCEDURE — 99215 OFFICE O/P EST HI 40 MIN: CPT | Mod: S$PBB,,, | Performed by: INTERNAL MEDICINE

## 2021-01-20 PROCEDURE — 99999 PR PBB SHADOW E&M-EST. PATIENT-LVL V: CPT | Mod: PBBFAC,,, | Performed by: INTERNAL MEDICINE

## 2021-01-20 PROCEDURE — 71046 X-RAY EXAM CHEST 2 VIEWS: CPT | Mod: 26,,, | Performed by: RADIOLOGY

## 2021-01-20 RX ORDER — ALBUTEROL SULFATE 90 UG/1
2 AEROSOL, METERED RESPIRATORY (INHALATION) EVERY 4 HOURS PRN
Qty: 18 G | Refills: 11 | Status: SHIPPED | OUTPATIENT
Start: 2021-01-20 | End: 2021-01-20 | Stop reason: SDUPTHER

## 2021-01-20 RX ORDER — ALBUTEROL SULFATE 90 UG/1
2 AEROSOL, METERED RESPIRATORY (INHALATION) EVERY 4 HOURS PRN
Qty: 18 G | Refills: 11 | Status: SHIPPED | OUTPATIENT
Start: 2021-01-20 | End: 2021-05-25 | Stop reason: SDUPTHER

## 2021-01-21 DIAGNOSIS — G47.33 OSA TREATED WITH BIPAP: Primary | ICD-10-CM

## 2021-01-22 ENCOUNTER — HOSPITAL ENCOUNTER (OUTPATIENT)
Dept: RADIOLOGY | Facility: HOSPITAL | Age: 71
Discharge: HOME | End: 2021-01-22
Attending: INTERNAL MEDICINE
Payer: MEDICARE

## 2021-01-22 DIAGNOSIS — R07.9 ACUTE CHEST PAIN: ICD-10-CM

## 2021-01-22 DIAGNOSIS — R06.09 DYSPNEA ON EXERTION: ICD-10-CM

## 2021-01-22 PROCEDURE — 71275 CT ANGIOGRAPHY CHEST: CPT | Mod: TC

## 2021-01-22 PROCEDURE — 25500020 PHARM REV CODE 255: Performed by: INTERNAL MEDICINE

## 2021-01-22 RX ADMIN — IOHEXOL 100 ML: 350 INJECTION, SOLUTION INTRAVENOUS at 01:01

## 2021-01-27 ENCOUNTER — HOSPITAL ENCOUNTER (OUTPATIENT)
Dept: CARDIOLOGY | Facility: HOSPITAL | Age: 71
Discharge: HOME | End: 2021-01-27
Attending: INTERNAL MEDICINE
Payer: MEDICARE

## 2021-01-27 VITALS
HEART RATE: 75 BPM | BODY MASS INDEX: 47.13 KG/M2 | HEIGHT: 68 IN | WEIGHT: 311 LBS | DIASTOLIC BLOOD PRESSURE: 86 MMHG | SYSTOLIC BLOOD PRESSURE: 140 MMHG

## 2021-01-27 DIAGNOSIS — R06.09 DYSPNEA ON EXERTION: ICD-10-CM

## 2021-01-27 LAB
ASCENDING AORTA: 3.41 CM
AV INDEX (PROSTH): 0.78
AV MEAN GRADIENT: 7 MMHG
AV PEAK GRADIENT: 12 MMHG
AV VALVE AREA: 3.34 CM2
AV VELOCITY RATIO: 0.68
BSA FOR ECHO PROCEDURE: 2.6 M2
CV ECHO LV RWT: 0.86 CM
DOP CALC AO PEAK VEL: 1.76 M/S
DOP CALC AO VTI: 34.59 CM
DOP CALC LVOT AREA: 4.3 CM2
DOP CALC LVOT DIAMETER: 2.34 CM
DOP CALC LVOT PEAK VEL: 1.2 M/S
DOP CALC LVOT STROKE VOLUME: 115.67 CM3
DOP CALCLVOT PEAK VEL VTI: 26.91 CM
E WAVE DECELERATION TIME: 217.88 MSEC
E/A RATIO: 0.92
ECHO LV POSTERIOR WALL: 1.62 CM (ref 0.6–1.1)
FRACTIONAL SHORTENING: 41 % (ref 28–44)
INTERVENTRICULAR SEPTUM: 1.74 CM (ref 0.6–1.1)
LA MAJOR: 5.15 CM
LA WIDTH: 3.67 CM
LEFT ATRIUM SIZE: 3.87 CM
LEFT INTERNAL DIMENSION IN SYSTOLE: 2.24 CM (ref 2.1–4)
LEFT VENTRICLE DIASTOLIC VOLUME INDEX: 24.78 ML/M2
LEFT VENTRICLE DIASTOLIC VOLUME: 61.21 ML
LEFT VENTRICLE MASS INDEX: 105 G/M2
LEFT VENTRICLE SYSTOLIC VOLUME INDEX: 6.9 ML/M2
LEFT VENTRICLE SYSTOLIC VOLUME: 17.01 ML
LEFT VENTRICULAR INTERNAL DIMENSION IN DIASTOLE: 3.78 CM (ref 3.5–6)
LEFT VENTRICULAR MASS: 258.51 G
MV PEAK A VEL: 0.88 M/S
MV PEAK E VEL: 0.81 M/S
PISA TR MAX VEL: 3.85 M/S
RA MAJOR: 4.43 CM
RA PRESSURE: 3 MMHG
RA WIDTH: 3.79 CM
RIGHT VENTRICULAR END-DIASTOLIC DIMENSION: 3.96 CM
SINUS: 3.27 CM
STJ: 2.87 CM
TR MAX PG: 59 MMHG
TV REST PULMONARY ARTERY PRESSURE: 62 MMHG

## 2021-01-27 PROCEDURE — A4216 STERILE WATER/SALINE, 10 ML: HCPCS | Performed by: INTERNAL MEDICINE

## 2021-01-27 PROCEDURE — 93306 TTE W/DOPPLER COMPLETE: CPT | Mod: 26,,, | Performed by: INTERNAL MEDICINE

## 2021-01-27 PROCEDURE — 25000003 PHARM REV CODE 250: Performed by: INTERNAL MEDICINE

## 2021-01-27 PROCEDURE — 93306 ECHO (CUPID ONLY): ICD-10-PCS | Mod: 26,,, | Performed by: INTERNAL MEDICINE

## 2021-01-27 PROCEDURE — 25500020 PHARM REV CODE 255: Performed by: INTERNAL MEDICINE

## 2021-01-27 PROCEDURE — C8929 TTE W OR WO FOL WCON,DOPPLER: HCPCS

## 2021-01-27 RX ORDER — SODIUM CHLORIDE 0.9 % (FLUSH) 0.9 %
10 SYRINGE (ML) INJECTION
Status: SHIPPED | OUTPATIENT
Start: 2021-01-27

## 2021-01-27 RX ADMIN — HUMAN ALBUMIN MICROSPHERES AND PERFLUTREN 0.66 MG: 10; .22 INJECTION, SOLUTION INTRAVENOUS at 02:01

## 2021-01-27 RX ADMIN — Medication 10 ML: at 02:01

## 2021-01-28 ENCOUNTER — CLINICAL SUPPORT (OUTPATIENT)
Dept: FAMILY MEDICINE | Facility: CLINIC | Age: 71
End: 2021-01-28
Payer: MEDICARE

## 2021-01-28 DIAGNOSIS — G47.33 OSA TREATED WITH BIPAP: ICD-10-CM

## 2021-01-28 PROCEDURE — U0003 INFECTIOUS AGENT DETECTION BY NUCLEIC ACID (DNA OR RNA); SEVERE ACUTE RESPIRATORY SYNDROME CORONAVIRUS 2 (SARS-COV-2) (CORONAVIRUS DISEASE [COVID-19]), AMPLIFIED PROBE TECHNIQUE, MAKING USE OF HIGH THROUGHPUT TECHNOLOGIES AS DESCRIBED BY CMS-2020-01-R: HCPCS

## 2021-01-29 LAB — SARS-COV-2 RNA RESP QL NAA+PROBE: NOT DETECTED

## 2021-01-31 ENCOUNTER — EXTERNAL CHRONIC CARE MANAGEMENT (OUTPATIENT)
Dept: PRIMARY CARE CLINIC | Facility: CLINIC | Age: 71
End: 2021-01-31
Payer: MEDICARE

## 2021-01-31 ENCOUNTER — HOSPITAL ENCOUNTER (OUTPATIENT)
Dept: SLEEP MEDICINE | Facility: HOSPITAL | Age: 71
Discharge: HOME | End: 2021-01-31
Attending: INTERNAL MEDICINE
Payer: MEDICARE

## 2021-01-31 DIAGNOSIS — G47.33 OSA TREATED WITH BIPAP: ICD-10-CM

## 2021-01-31 DIAGNOSIS — G47.34 NOCTURNAL HYPOXEMIA: ICD-10-CM

## 2021-01-31 PROCEDURE — 99490 CHRNC CARE MGMT STAFF 1ST 20: CPT | Mod: S$PBB,,, | Performed by: FAMILY MEDICINE

## 2021-01-31 PROCEDURE — 95811 PR POLYSOMNOGRAPHY W/CPAP: ICD-10-PCS | Mod: 26,,, | Performed by: INTERNAL MEDICINE

## 2021-01-31 PROCEDURE — 99490 CHRNC CARE MGMT STAFF 1ST 20: CPT | Mod: PBBFAC,PO | Performed by: FAMILY MEDICINE

## 2021-01-31 PROCEDURE — 95811 POLYSOM 6/>YRS CPAP 4/> PARM: CPT | Mod: 26,,, | Performed by: INTERNAL MEDICINE

## 2021-01-31 PROCEDURE — 99490 PR CHRONIC CARE MGMT, 1ST 20 MIN: ICD-10-PCS | Mod: S$PBB,,, | Performed by: FAMILY MEDICINE

## 2021-01-31 PROCEDURE — 95811 POLYSOM 6/>YRS CPAP 4/> PARM: CPT

## 2021-02-05 ENCOUNTER — OFFICE VISIT (OUTPATIENT)
Dept: PULMONOLOGY | Facility: CLINIC | Age: 71
End: 2021-02-05
Payer: MEDICARE

## 2021-02-05 VITALS
WEIGHT: 311.5 LBS | HEART RATE: 97 BPM | RESPIRATION RATE: 20 BRPM | OXYGEN SATURATION: 98 % | SYSTOLIC BLOOD PRESSURE: 124 MMHG | HEIGHT: 68 IN | DIASTOLIC BLOOD PRESSURE: 64 MMHG | BODY MASS INDEX: 47.21 KG/M2

## 2021-02-05 DIAGNOSIS — R06.09 DOE (DYSPNEA ON EXERTION): ICD-10-CM

## 2021-02-05 DIAGNOSIS — I10 ESSENTIAL HYPERTENSION: ICD-10-CM

## 2021-02-05 DIAGNOSIS — E66.01 OBESITY, CLASS III, BMI 40-49.9 (MORBID OBESITY): ICD-10-CM

## 2021-02-05 DIAGNOSIS — Z86.73 HISTORY OF CVA (CEREBROVASCULAR ACCIDENT): ICD-10-CM

## 2021-02-05 DIAGNOSIS — G47.33 OSA TREATED WITH BIPAP: Primary | ICD-10-CM

## 2021-02-05 DIAGNOSIS — G47.34 NOCTURNAL HYPOXEMIA: ICD-10-CM

## 2021-02-05 DIAGNOSIS — I87.2 VENOUS INSUFFICIENCY: ICD-10-CM

## 2021-02-05 DIAGNOSIS — U07.1 COVID-19: ICD-10-CM

## 2021-02-05 DIAGNOSIS — I27.20 PULMONARY HYPERTENSION: ICD-10-CM

## 2021-02-05 PROCEDURE — 99999 PR PBB SHADOW E&M-EST. PATIENT-LVL III: ICD-10-PCS | Mod: PBBFAC,,, | Performed by: INTERNAL MEDICINE

## 2021-02-05 PROCEDURE — 99215 PR OFFICE/OUTPT VISIT, EST, LEVL V, 40-54 MIN: ICD-10-PCS | Mod: S$PBB,,, | Performed by: INTERNAL MEDICINE

## 2021-02-05 PROCEDURE — 99999 PR PBB SHADOW E&M-EST. PATIENT-LVL III: CPT | Mod: PBBFAC,,, | Performed by: INTERNAL MEDICINE

## 2021-02-05 PROCEDURE — 99215 OFFICE O/P EST HI 40 MIN: CPT | Mod: S$PBB,,, | Performed by: INTERNAL MEDICINE

## 2021-02-05 PROCEDURE — 99213 OFFICE O/P EST LOW 20 MIN: CPT | Mod: PBBFAC | Performed by: INTERNAL MEDICINE

## 2021-02-18 ENCOUNTER — TELEPHONE (OUTPATIENT)
Dept: PULMONOLOGY | Facility: CLINIC | Age: 71
End: 2021-02-18

## 2021-02-19 ENCOUNTER — CLINICAL SUPPORT (OUTPATIENT)
Dept: PULMONOLOGY | Facility: CLINIC | Age: 71
End: 2021-02-19
Payer: MEDICARE

## 2021-02-19 DIAGNOSIS — I27.20 PULMONARY HYPERTENSION: ICD-10-CM

## 2021-02-19 DIAGNOSIS — G47.34 NOCTURNAL HYPOXEMIA: ICD-10-CM

## 2021-02-19 PROCEDURE — 99999 PR PBB SHADOW E&M-EST. PATIENT-LVL I: CPT | Mod: PBBFAC,,,

## 2021-02-19 PROCEDURE — 94762 N-INVAS EAR/PLS OXIMTRY CONT: CPT | Mod: PBBFAC

## 2021-02-19 PROCEDURE — 99999 PR PBB SHADOW E&M-EST. PATIENT-LVL I: ICD-10-PCS | Mod: PBBFAC,,,

## 2021-02-19 PROCEDURE — 99211 OFF/OP EST MAY X REQ PHY/QHP: CPT | Mod: PBBFAC,25

## 2021-02-25 ENCOUNTER — TELEPHONE (OUTPATIENT)
Dept: PULMONOLOGY | Facility: CLINIC | Age: 71
End: 2021-02-25

## 2021-02-25 DIAGNOSIS — G47.33 OSA TREATED WITH BIPAP: ICD-10-CM

## 2021-02-25 DIAGNOSIS — G47.34 NOCTURNAL HYPOXEMIA: Primary | ICD-10-CM

## 2021-02-28 ENCOUNTER — EXTERNAL CHRONIC CARE MANAGEMENT (OUTPATIENT)
Dept: PRIMARY CARE CLINIC | Facility: CLINIC | Age: 71
End: 2021-02-28
Payer: MEDICARE

## 2021-02-28 PROCEDURE — 99439 CHRNC CARE MGMT STAF EA ADDL: CPT | Mod: S$PBB,,, | Performed by: FAMILY MEDICINE

## 2021-02-28 PROCEDURE — 99490 PR CHRONIC CARE MGMT, 1ST 20 MIN: ICD-10-PCS | Mod: S$PBB,,, | Performed by: FAMILY MEDICINE

## 2021-02-28 PROCEDURE — 99439 PR CHRONIC CARE MGMT, EA ADDTL 20 MIN: ICD-10-PCS | Mod: S$PBB,,, | Performed by: FAMILY MEDICINE

## 2021-02-28 PROCEDURE — 99439 CHRNC CARE MGMT STAF EA ADDL: CPT | Mod: PBBFAC,PO | Performed by: FAMILY MEDICINE

## 2021-02-28 PROCEDURE — 99490 CHRNC CARE MGMT STAFF 1ST 20: CPT | Mod: PBBFAC,PO | Performed by: FAMILY MEDICINE

## 2021-02-28 PROCEDURE — 99490 CHRNC CARE MGMT STAFF 1ST 20: CPT | Mod: S$PBB,,, | Performed by: FAMILY MEDICINE

## 2021-03-12 ENCOUNTER — NURSE TRIAGE (OUTPATIENT)
Dept: ADMINISTRATIVE | Facility: CLINIC | Age: 71
End: 2021-03-12

## 2021-03-12 ENCOUNTER — TELEPHONE (OUTPATIENT)
Dept: FAMILY MEDICINE | Facility: CLINIC | Age: 71
End: 2021-03-12

## 2021-03-12 ENCOUNTER — TELEPHONE (OUTPATIENT)
Dept: PULMONOLOGY | Facility: CLINIC | Age: 71
End: 2021-03-12

## 2021-03-13 ENCOUNTER — OFFICE VISIT (OUTPATIENT)
Dept: FAMILY MEDICINE | Facility: CLINIC | Age: 71
End: 2021-03-13
Payer: MEDICARE

## 2021-03-13 VITALS
WEIGHT: 315 LBS | TEMPERATURE: 98 F | HEART RATE: 47 BPM | SYSTOLIC BLOOD PRESSURE: 170 MMHG | OXYGEN SATURATION: 88 % | DIASTOLIC BLOOD PRESSURE: 92 MMHG | BODY MASS INDEX: 47.74 KG/M2 | RESPIRATION RATE: 16 BRPM | HEIGHT: 68 IN

## 2021-03-13 DIAGNOSIS — R00.1 BRADYCARDIA: ICD-10-CM

## 2021-03-13 DIAGNOSIS — R06.02 SOB (SHORTNESS OF BREATH): Primary | ICD-10-CM

## 2021-03-13 DIAGNOSIS — R79.81 LOW OXYGEN SATURATION: ICD-10-CM

## 2021-03-13 DIAGNOSIS — R60.9 EDEMA, UNSPECIFIED TYPE: ICD-10-CM

## 2021-03-13 DIAGNOSIS — R60.0 FACIAL EDEMA: ICD-10-CM

## 2021-03-13 DIAGNOSIS — I10 ELEVATED BLOOD PRESSURE READING WITH DIAGNOSIS OF HYPERTENSION: ICD-10-CM

## 2021-03-13 PROCEDURE — 99999 PR PBB SHADOW E&M-EST. PATIENT-LVL V: ICD-10-PCS | Mod: PBBFAC,,, | Performed by: NURSE PRACTITIONER

## 2021-03-13 PROCEDURE — 99499 NO LOS: ICD-10-PCS | Mod: S$PBB,,, | Performed by: NURSE PRACTITIONER

## 2021-03-13 PROCEDURE — 99999 PR PBB SHADOW E&M-EST. PATIENT-LVL V: CPT | Mod: PBBFAC,,, | Performed by: NURSE PRACTITIONER

## 2021-03-13 PROCEDURE — 99215 OFFICE O/P EST HI 40 MIN: CPT | Mod: PBBFAC,PO | Performed by: NURSE PRACTITIONER

## 2021-03-13 PROCEDURE — 99499 UNLISTED E&M SERVICE: CPT | Mod: S$PBB,,, | Performed by: NURSE PRACTITIONER

## 2021-03-15 ENCOUNTER — TELEPHONE (OUTPATIENT)
Dept: FAMILY MEDICINE | Facility: CLINIC | Age: 71
End: 2021-03-15

## 2021-03-15 ENCOUNTER — PATIENT MESSAGE (OUTPATIENT)
Dept: PULMONOLOGY | Facility: CLINIC | Age: 71
End: 2021-03-15

## 2021-03-15 ENCOUNTER — PATIENT MESSAGE (OUTPATIENT)
Dept: FAMILY MEDICINE | Facility: CLINIC | Age: 71
End: 2021-03-15

## 2021-03-15 DIAGNOSIS — R09.02 EXERCISE HYPOXEMIA: Primary | ICD-10-CM

## 2021-03-15 DIAGNOSIS — R06.09 DYSPNEA ON EXERTION: ICD-10-CM

## 2021-03-15 DIAGNOSIS — J45.20 MILD INTERMITTENT ASTHMA, UNSPECIFIED WHETHER COMPLICATED: ICD-10-CM

## 2021-03-16 ENCOUNTER — OFFICE VISIT (OUTPATIENT)
Dept: SLEEP MEDICINE | Facility: CLINIC | Age: 71
End: 2021-03-16
Payer: MEDICARE

## 2021-03-16 ENCOUNTER — HOSPITAL ENCOUNTER (OUTPATIENT)
Dept: CARDIOLOGY | Facility: HOSPITAL | Age: 71
Discharge: HOME | End: 2021-03-16
Payer: MEDICARE

## 2021-03-16 ENCOUNTER — CLINICAL SUPPORT (OUTPATIENT)
Dept: PULMONOLOGY | Facility: CLINIC | Age: 71
End: 2021-03-16
Payer: MEDICARE

## 2021-03-16 ENCOUNTER — HOSPITAL ENCOUNTER (EMERGENCY)
Facility: HOSPITAL | Age: 71
Discharge: HOME | End: 2021-03-16
Attending: EMERGENCY MEDICINE
Payer: MEDICARE

## 2021-03-16 ENCOUNTER — PATIENT OUTREACH (OUTPATIENT)
Dept: ADMINISTRATIVE | Facility: OTHER | Age: 71
End: 2021-03-16

## 2021-03-16 VITALS
OXYGEN SATURATION: 94 % | HEIGHT: 68 IN | RESPIRATION RATE: 18 BRPM | BODY MASS INDEX: 47.74 KG/M2 | SYSTOLIC BLOOD PRESSURE: 140 MMHG | WEIGHT: 315 LBS | DIASTOLIC BLOOD PRESSURE: 80 MMHG | HEART RATE: 34 BPM

## 2021-03-16 VITALS
HEART RATE: 72 BPM | DIASTOLIC BLOOD PRESSURE: 66 MMHG | RESPIRATION RATE: 27 BRPM | OXYGEN SATURATION: 95 % | SYSTOLIC BLOOD PRESSURE: 140 MMHG | TEMPERATURE: 99 F

## 2021-03-16 DIAGNOSIS — R06.00 DYSPNEA, UNSPECIFIED TYPE: Primary | ICD-10-CM

## 2021-03-16 DIAGNOSIS — G47.34 NOCTURNAL HYPOXEMIA: ICD-10-CM

## 2021-03-16 DIAGNOSIS — G47.33 OSA TREATED WITH BIPAP: Primary | ICD-10-CM

## 2021-03-16 DIAGNOSIS — J45.20 MILD INTERMITTENT ASTHMA, UNSPECIFIED WHETHER COMPLICATED: ICD-10-CM

## 2021-03-16 DIAGNOSIS — R06.02 SHORTNESS OF BREATH: ICD-10-CM

## 2021-03-16 DIAGNOSIS — R09.02 EXERCISE HYPOXEMIA: ICD-10-CM

## 2021-03-16 DIAGNOSIS — R06.02 SOB (SHORTNESS OF BREATH): ICD-10-CM

## 2021-03-16 DIAGNOSIS — I50.9 CONGESTIVE HEART FAILURE, UNSPECIFIED HF CHRONICITY, UNSPECIFIED HEART FAILURE TYPE: ICD-10-CM

## 2021-03-16 DIAGNOSIS — I27.20 PULMONARY HYPERTENSION: ICD-10-CM

## 2021-03-16 DIAGNOSIS — R94.31 ABNORMAL EKG: ICD-10-CM

## 2021-03-16 DIAGNOSIS — R06.09 DYSPNEA ON EXERTION: ICD-10-CM

## 2021-03-16 DIAGNOSIS — R00.1 BRADYCARDIA: ICD-10-CM

## 2021-03-16 LAB
ALBUMIN SERPL BCP-MCNC: 3.4 G/DL (ref 3.5–5.2)
ALP SERPL-CCNC: 66 U/L (ref 55–135)
ALT SERPL W/O P-5'-P-CCNC: 25 U/L (ref 10–44)
ANION GAP SERPL CALC-SCNC: 11 MMOL/L (ref 8–16)
AST SERPL-CCNC: 23 U/L (ref 10–40)
BASOPHILS # BLD AUTO: 0.06 K/UL (ref 0–0.2)
BASOPHILS NFR BLD: 0.6 % (ref 0–1.9)
BILIRUB SERPL-MCNC: 0.9 MG/DL (ref 0.1–1)
BNP SERPL-MCNC: 438 PG/ML (ref 0–99)
BUN SERPL-MCNC: 13 MG/DL (ref 8–23)
CALCIUM SERPL-MCNC: 8.9 MG/DL (ref 8.7–10.5)
CHLORIDE SERPL-SCNC: 97 MMOL/L (ref 95–110)
CO2 SERPL-SCNC: 33 MMOL/L (ref 23–29)
CREAT SERPL-MCNC: 0.8 MG/DL (ref 0.5–1.4)
CTP QC/QA: YES
DIFFERENTIAL METHOD: ABNORMAL
EOSINOPHIL # BLD AUTO: 0.1 K/UL (ref 0–0.5)
EOSINOPHIL NFR BLD: 1.2 % (ref 0–8)
ERYTHROCYTE [DISTWIDTH] IN BLOOD BY AUTOMATED COUNT: 16 % (ref 11.5–14.5)
EST. GFR  (AFRICAN AMERICAN): >60 ML/MIN/1.73 M^2
EST. GFR  (NON AFRICAN AMERICAN): >60 ML/MIN/1.73 M^2
GLUCOSE SERPL-MCNC: 103 MG/DL (ref 70–110)
HCT VFR BLD AUTO: 50.8 % (ref 40–54)
HGB BLD-MCNC: 15.9 G/DL (ref 14–18)
IMM GRANULOCYTES # BLD AUTO: 0.04 K/UL (ref 0–0.04)
IMM GRANULOCYTES NFR BLD AUTO: 0.4 % (ref 0–0.5)
LYMPHOCYTES # BLD AUTO: 1.5 K/UL (ref 1–4.8)
LYMPHOCYTES NFR BLD: 13.8 % (ref 18–48)
MCH RBC QN AUTO: 27.5 PG (ref 27–31)
MCHC RBC AUTO-ENTMCNC: 31.3 G/DL (ref 32–36)
MCV RBC AUTO: 88 FL (ref 82–98)
MONOCYTES # BLD AUTO: 0.9 K/UL (ref 0.3–1)
MONOCYTES NFR BLD: 8.3 % (ref 4–15)
NEUTROPHILS # BLD AUTO: 8.1 K/UL (ref 1.8–7.7)
NEUTROPHILS NFR BLD: 75.7 % (ref 38–73)
NRBC BLD-RTO: 0 /100 WBC
PLATELET # BLD AUTO: 201 K/UL (ref 150–350)
PMV BLD AUTO: 9.1 FL (ref 9.2–12.9)
POTASSIUM SERPL-SCNC: 4.7 MMOL/L (ref 3.5–5.1)
PROT SERPL-MCNC: 6.8 G/DL (ref 6–8.4)
RBC # BLD AUTO: 5.78 M/UL (ref 4.6–6.2)
SARS-COV-2 RDRP RESP QL NAA+PROBE: NEGATIVE
SODIUM SERPL-SCNC: 141 MMOL/L (ref 136–145)
TROPONIN I SERPL DL<=0.01 NG/ML-MCNC: 0.01 NG/ML (ref 0–0.03)
WBC # BLD AUTO: 10.63 K/UL (ref 3.9–12.7)

## 2021-03-16 PROCEDURE — 93010 ELECTROCARDIOGRAM REPORT: CPT | Mod: ,,, | Performed by: INTERNAL MEDICINE

## 2021-03-16 PROCEDURE — 84484 ASSAY OF TROPONIN QUANT: CPT | Performed by: NURSE PRACTITIONER

## 2021-03-16 PROCEDURE — U0002 COVID-19 LAB TEST NON-CDC: HCPCS | Performed by: NURSE PRACTITIONER

## 2021-03-16 PROCEDURE — 93010 EKG 12-LEAD: ICD-10-PCS | Mod: ,,, | Performed by: INTERNAL MEDICINE

## 2021-03-16 PROCEDURE — 93005 ELECTROCARDIOGRAM TRACING: CPT

## 2021-03-16 PROCEDURE — 99214 OFFICE O/P EST MOD 30 MIN: CPT | Mod: PBBFAC,25 | Performed by: NURSE PRACTITIONER

## 2021-03-16 PROCEDURE — 99285 EMERGENCY DEPT VISIT HI MDM: CPT | Mod: 25,27

## 2021-03-16 PROCEDURE — 63600175 PHARM REV CODE 636 W HCPCS: Performed by: EMERGENCY MEDICINE

## 2021-03-16 PROCEDURE — 96374 THER/PROPH/DIAG INJ IV PUSH: CPT

## 2021-03-16 PROCEDURE — 85025 COMPLETE CBC W/AUTO DIFF WBC: CPT | Performed by: NURSE PRACTITIONER

## 2021-03-16 PROCEDURE — 99999 PR PBB SHADOW E&M-EST. PATIENT-LVL IV: ICD-10-PCS | Mod: PBBFAC,,, | Performed by: NURSE PRACTITIONER

## 2021-03-16 PROCEDURE — 99215 OFFICE O/P EST HI 40 MIN: CPT | Mod: S$PBB,,, | Performed by: NURSE PRACTITIONER

## 2021-03-16 PROCEDURE — 99215 PR OFFICE/OUTPT VISIT, EST, LEVL V, 40-54 MIN: ICD-10-PCS | Mod: S$PBB,,, | Performed by: NURSE PRACTITIONER

## 2021-03-16 PROCEDURE — 99999 PR PBB SHADOW E&M-EST. PATIENT-LVL IV: CPT | Mod: PBBFAC,,, | Performed by: NURSE PRACTITIONER

## 2021-03-16 PROCEDURE — 80053 COMPREHEN METABOLIC PANEL: CPT | Performed by: NURSE PRACTITIONER

## 2021-03-16 PROCEDURE — 83880 ASSAY OF NATRIURETIC PEPTIDE: CPT | Performed by: NURSE PRACTITIONER

## 2021-03-16 RX ORDER — FUROSEMIDE 20 MG/1
20 TABLET ORAL
COMMUNITY
Start: 2021-03-13 | End: 2021-03-17

## 2021-03-16 RX ORDER — FUROSEMIDE 10 MG/ML
40 INJECTION INTRAMUSCULAR; INTRAVENOUS
Status: COMPLETED | OUTPATIENT
Start: 2021-03-16 | End: 2021-03-16

## 2021-03-16 RX ORDER — TORSEMIDE 20 MG/1
20 TABLET ORAL DAILY
Qty: 30 TABLET | Refills: 1 | Status: SHIPPED | OUTPATIENT
Start: 2021-03-16 | End: 2021-03-24 | Stop reason: SDUPTHER

## 2021-03-16 RX ADMIN — FUROSEMIDE 40 MG: 10 INJECTION, SOLUTION INTRAMUSCULAR; INTRAVENOUS at 07:03

## 2021-03-17 ENCOUNTER — OFFICE VISIT (OUTPATIENT)
Dept: FAMILY MEDICINE | Facility: CLINIC | Age: 71
End: 2021-03-17
Payer: MEDICARE

## 2021-03-17 VITALS
HEIGHT: 68 IN | BODY MASS INDEX: 47.74 KG/M2 | WEIGHT: 315 LBS | RESPIRATION RATE: 18 BRPM | OXYGEN SATURATION: 93 % | SYSTOLIC BLOOD PRESSURE: 178 MMHG | TEMPERATURE: 98 F | DIASTOLIC BLOOD PRESSURE: 82 MMHG | HEART RATE: 86 BPM

## 2021-03-17 DIAGNOSIS — I27.20 PULMONARY HYPERTENSION: ICD-10-CM

## 2021-03-17 DIAGNOSIS — I50.9 CONGESTIVE HEART FAILURE, UNSPECIFIED HF CHRONICITY, UNSPECIFIED HEART FAILURE TYPE: Primary | ICD-10-CM

## 2021-03-17 PROCEDURE — 99999 PR PBB SHADOW E&M-EST. PATIENT-LVL IV: CPT | Mod: PBBFAC,,, | Performed by: FAMILY MEDICINE

## 2021-03-17 PROCEDURE — 99214 OFFICE O/P EST MOD 30 MIN: CPT | Mod: S$PBB,,, | Performed by: FAMILY MEDICINE

## 2021-03-17 PROCEDURE — 99214 PR OFFICE/OUTPT VISIT, EST, LEVL IV, 30-39 MIN: ICD-10-PCS | Mod: S$PBB,,, | Performed by: FAMILY MEDICINE

## 2021-03-17 PROCEDURE — 99214 OFFICE O/P EST MOD 30 MIN: CPT | Mod: PBBFAC,PO | Performed by: FAMILY MEDICINE

## 2021-03-17 PROCEDURE — 99999 PR PBB SHADOW E&M-EST. PATIENT-LVL IV: ICD-10-PCS | Mod: PBBFAC,,, | Performed by: FAMILY MEDICINE

## 2021-03-17 RX ORDER — METOPROLOL TARTRATE 100 MG/1
100 TABLET ORAL 2 TIMES DAILY
Qty: 180 TABLET | Refills: 3 | Status: SHIPPED | OUTPATIENT
Start: 2021-03-17 | End: 2021-04-12

## 2021-03-24 ENCOUNTER — OFFICE VISIT (OUTPATIENT)
Dept: CARDIOLOGY | Facility: CLINIC | Age: 71
End: 2021-03-24
Payer: MEDICARE

## 2021-03-24 ENCOUNTER — HOSPITAL ENCOUNTER (OUTPATIENT)
Dept: CARDIOLOGY | Facility: HOSPITAL | Age: 71
Discharge: HOME | End: 2021-03-24
Attending: INTERNAL MEDICINE
Payer: MEDICARE

## 2021-03-24 ENCOUNTER — PATIENT MESSAGE (OUTPATIENT)
Dept: ADMINISTRATIVE | Facility: OTHER | Age: 71
End: 2021-03-24

## 2021-03-24 DIAGNOSIS — R06.02 SHORTNESS OF BREATH: ICD-10-CM

## 2021-03-24 DIAGNOSIS — G47.33 OSA TREATED WITH BIPAP: ICD-10-CM

## 2021-03-24 DIAGNOSIS — R06.02 SHORTNESS OF BREATH: Primary | ICD-10-CM

## 2021-03-24 DIAGNOSIS — I10 ESSENTIAL HYPERTENSION: ICD-10-CM

## 2021-03-24 DIAGNOSIS — Z86.73 HISTORY OF CVA (CEREBROVASCULAR ACCIDENT): Primary | ICD-10-CM

## 2021-03-24 DIAGNOSIS — I50.9 CONGESTIVE HEART FAILURE, UNSPECIFIED HF CHRONICITY, UNSPECIFIED HEART FAILURE TYPE: ICD-10-CM

## 2021-03-24 DIAGNOSIS — R60.9 CHRONIC EDEMA: ICD-10-CM

## 2021-03-24 DIAGNOSIS — I27.20 PULMONARY HYPERTENSION: ICD-10-CM

## 2021-03-24 DIAGNOSIS — E78.00 PURE HYPERCHOLESTEROLEMIA: ICD-10-CM

## 2021-03-24 PROCEDURE — 99999 PR PBB SHADOW E&M-EST. PATIENT-LVL III: ICD-10-PCS | Mod: PBBFAC,,, | Performed by: INTERNAL MEDICINE

## 2021-03-24 PROCEDURE — 99999 PR PBB SHADOW E&M-EST. PATIENT-LVL III: CPT | Mod: PBBFAC,,, | Performed by: INTERNAL MEDICINE

## 2021-03-24 PROCEDURE — 93010 ELECTROCARDIOGRAM REPORT: CPT | Mod: ,,, | Performed by: INTERNAL MEDICINE

## 2021-03-24 PROCEDURE — 93005 ELECTROCARDIOGRAM TRACING: CPT | Mod: PO

## 2021-03-24 PROCEDURE — 99214 OFFICE O/P EST MOD 30 MIN: CPT | Mod: S$PBB,,, | Performed by: INTERNAL MEDICINE

## 2021-03-24 PROCEDURE — 99214 PR OFFICE/OUTPT VISIT, EST, LEVL IV, 30-39 MIN: ICD-10-PCS | Mod: S$PBB,,, | Performed by: INTERNAL MEDICINE

## 2021-03-24 PROCEDURE — 99213 OFFICE O/P EST LOW 20 MIN: CPT | Mod: PBBFAC,25,PO | Performed by: INTERNAL MEDICINE

## 2021-03-24 PROCEDURE — 93010 EKG 12-LEAD: ICD-10-PCS | Mod: ,,, | Performed by: INTERNAL MEDICINE

## 2021-03-24 RX ORDER — TORSEMIDE 20 MG/1
20 TABLET ORAL DAILY
Qty: 90 TABLET | Refills: 3 | OUTPATIENT
Start: 2021-03-24 | End: 2021-03-24 | Stop reason: SDUPTHER

## 2021-03-24 RX ORDER — TORSEMIDE 20 MG/1
20 TABLET ORAL DAILY
Qty: 90 TABLET | Refills: 3 | Status: SHIPPED | OUTPATIENT
Start: 2021-03-24 | End: 2021-04-16 | Stop reason: SDUPTHER

## 2021-03-25 ENCOUNTER — TELEPHONE (OUTPATIENT)
Dept: FAMILY MEDICINE | Facility: CLINIC | Age: 71
End: 2021-03-25

## 2021-03-31 ENCOUNTER — EXTERNAL CHRONIC CARE MANAGEMENT (OUTPATIENT)
Dept: PRIMARY CARE CLINIC | Facility: CLINIC | Age: 71
End: 2021-03-31
Payer: MEDICARE

## 2021-03-31 PROCEDURE — 99487 CPLX CHRNC CARE 1ST 60 MIN: CPT | Mod: S$PBB,,, | Performed by: FAMILY MEDICINE

## 2021-03-31 PROCEDURE — 99489 PR COMPLX CHRON CARE MGMT, EA ADDTL 30 MIN, PER MONTH: ICD-10-PCS | Mod: S$PBB,,, | Performed by: FAMILY MEDICINE

## 2021-03-31 PROCEDURE — 99487 CPLX CHRNC CARE 1ST 60 MIN: CPT | Mod: PBBFAC,PO | Performed by: FAMILY MEDICINE

## 2021-03-31 PROCEDURE — 99489 CPLX CHRNC CARE EA ADDL 30: CPT | Mod: PBBFAC,PO | Performed by: FAMILY MEDICINE

## 2021-03-31 PROCEDURE — 99489 CPLX CHRNC CARE EA ADDL 30: CPT | Mod: S$PBB,,, | Performed by: FAMILY MEDICINE

## 2021-03-31 PROCEDURE — 99487 PR COMPLX CHRON CARE MGMT, 1ST HR, PER MONTH: ICD-10-PCS | Mod: S$PBB,,, | Performed by: FAMILY MEDICINE

## 2021-04-08 ENCOUNTER — PATIENT OUTREACH (OUTPATIENT)
Dept: ADMINISTRATIVE | Facility: HOSPITAL | Age: 71
End: 2021-04-08

## 2021-04-12 ENCOUNTER — OFFICE VISIT (OUTPATIENT)
Dept: FAMILY MEDICINE | Facility: CLINIC | Age: 71
End: 2021-04-12
Payer: MEDICARE

## 2021-04-12 VITALS
BODY MASS INDEX: 47.38 KG/M2 | SYSTOLIC BLOOD PRESSURE: 126 MMHG | WEIGHT: 312.63 LBS | HEART RATE: 58 BPM | TEMPERATURE: 97 F | DIASTOLIC BLOOD PRESSURE: 72 MMHG | HEIGHT: 68 IN

## 2021-04-12 DIAGNOSIS — R00.1 BRADYCARDIA: ICD-10-CM

## 2021-04-12 DIAGNOSIS — I50.9 CONGESTIVE HEART FAILURE, UNSPECIFIED HF CHRONICITY, UNSPECIFIED HEART FAILURE TYPE: ICD-10-CM

## 2021-04-12 DIAGNOSIS — R06.02 SOB (SHORTNESS OF BREATH): ICD-10-CM

## 2021-04-12 DIAGNOSIS — I10 ESSENTIAL HYPERTENSION: Primary | ICD-10-CM

## 2021-04-12 DIAGNOSIS — I27.20 PULMONARY HYPERTENSION: ICD-10-CM

## 2021-04-12 PROCEDURE — 99214 PR OFFICE/OUTPT VISIT, EST, LEVL IV, 30-39 MIN: ICD-10-PCS | Mod: S$PBB,,, | Performed by: FAMILY MEDICINE

## 2021-04-12 PROCEDURE — 99999 PR PBB SHADOW E&M-EST. PATIENT-LVL III: ICD-10-PCS | Mod: PBBFAC,,, | Performed by: FAMILY MEDICINE

## 2021-04-12 PROCEDURE — 99999 PR PBB SHADOW E&M-EST. PATIENT-LVL III: CPT | Mod: PBBFAC,,, | Performed by: FAMILY MEDICINE

## 2021-04-12 PROCEDURE — 99213 OFFICE O/P EST LOW 20 MIN: CPT | Mod: PBBFAC,PO | Performed by: FAMILY MEDICINE

## 2021-04-12 PROCEDURE — 99214 OFFICE O/P EST MOD 30 MIN: CPT | Mod: S$PBB,,, | Performed by: FAMILY MEDICINE

## 2021-04-12 RX ORDER — METOPROLOL TARTRATE 50 MG/1
50 TABLET ORAL 2 TIMES DAILY
Qty: 180 TABLET | Refills: 3 | Status: SHIPPED | OUTPATIENT
Start: 2021-04-12 | End: 2021-08-04 | Stop reason: SDUPTHER

## 2021-04-16 ENCOUNTER — PATIENT MESSAGE (OUTPATIENT)
Dept: FAMILY MEDICINE | Facility: CLINIC | Age: 71
End: 2021-04-16

## 2021-04-16 DIAGNOSIS — I50.9 CONGESTIVE HEART FAILURE, UNSPECIFIED HF CHRONICITY, UNSPECIFIED HEART FAILURE TYPE: ICD-10-CM

## 2021-04-16 DIAGNOSIS — R60.9 EDEMA, UNSPECIFIED TYPE: ICD-10-CM

## 2021-04-16 DIAGNOSIS — I10 ESSENTIAL HYPERTENSION: Primary | ICD-10-CM

## 2021-04-16 RX ORDER — TORSEMIDE 20 MG/1
20 TABLET ORAL DAILY
Qty: 90 TABLET | Refills: 0 | Status: SHIPPED | OUTPATIENT
Start: 2021-04-16 | End: 2021-07-06 | Stop reason: SDUPTHER

## 2021-04-20 ENCOUNTER — PES CALL (OUTPATIENT)
Dept: ADMINISTRATIVE | Facility: CLINIC | Age: 71
End: 2021-04-20

## 2021-04-30 ENCOUNTER — EXTERNAL CHRONIC CARE MANAGEMENT (OUTPATIENT)
Dept: PRIMARY CARE CLINIC | Facility: CLINIC | Age: 71
End: 2021-04-30
Payer: MEDICARE

## 2021-04-30 PROCEDURE — 99490 PR CHRONIC CARE MGMT, 1ST 20 MIN: ICD-10-PCS | Mod: S$PBB,,, | Performed by: FAMILY MEDICINE

## 2021-04-30 PROCEDURE — 99490 CHRNC CARE MGMT STAFF 1ST 20: CPT | Mod: PBBFAC,PO | Performed by: FAMILY MEDICINE

## 2021-04-30 PROCEDURE — 99490 CHRNC CARE MGMT STAFF 1ST 20: CPT | Mod: S$PBB,,, | Performed by: FAMILY MEDICINE

## 2021-05-12 ENCOUNTER — OFFICE VISIT (OUTPATIENT)
Dept: CARDIOLOGY | Facility: CLINIC | Age: 71
End: 2021-05-12
Payer: MEDICARE

## 2021-05-12 VITALS
HEIGHT: 68 IN | SYSTOLIC BLOOD PRESSURE: 116 MMHG | DIASTOLIC BLOOD PRESSURE: 62 MMHG | OXYGEN SATURATION: 93 % | WEIGHT: 303.31 LBS | HEART RATE: 58 BPM | BODY MASS INDEX: 45.97 KG/M2

## 2021-05-12 DIAGNOSIS — G47.33 OSA TREATED WITH BIPAP: ICD-10-CM

## 2021-05-12 DIAGNOSIS — R60.9 CHRONIC EDEMA: ICD-10-CM

## 2021-05-12 DIAGNOSIS — Z86.73 HISTORY OF CVA (CEREBROVASCULAR ACCIDENT): Primary | ICD-10-CM

## 2021-05-12 DIAGNOSIS — I10 ESSENTIAL HYPERTENSION: ICD-10-CM

## 2021-05-12 DIAGNOSIS — I87.2 VENOUS INSUFFICIENCY: ICD-10-CM

## 2021-05-12 DIAGNOSIS — I67.9 CEREBROVASCULAR DISEASE: ICD-10-CM

## 2021-05-12 DIAGNOSIS — I27.20 PULMONARY HYPERTENSION: ICD-10-CM

## 2021-05-12 DIAGNOSIS — E78.00 PURE HYPERCHOLESTEROLEMIA: ICD-10-CM

## 2021-05-12 PROCEDURE — 99214 OFFICE O/P EST MOD 30 MIN: CPT | Mod: S$PBB,,, | Performed by: INTERNAL MEDICINE

## 2021-05-12 PROCEDURE — 99999 PR PBB SHADOW E&M-EST. PATIENT-LVL III: ICD-10-PCS | Mod: PBBFAC,,, | Performed by: INTERNAL MEDICINE

## 2021-05-12 PROCEDURE — 99214 PR OFFICE/OUTPT VISIT, EST, LEVL IV, 30-39 MIN: ICD-10-PCS | Mod: S$PBB,,, | Performed by: INTERNAL MEDICINE

## 2021-05-12 PROCEDURE — 99213 OFFICE O/P EST LOW 20 MIN: CPT | Mod: PBBFAC,PO | Performed by: INTERNAL MEDICINE

## 2021-05-12 PROCEDURE — 99999 PR PBB SHADOW E&M-EST. PATIENT-LVL III: CPT | Mod: PBBFAC,,, | Performed by: INTERNAL MEDICINE

## 2021-05-18 ENCOUNTER — PATIENT MESSAGE (OUTPATIENT)
Dept: FAMILY MEDICINE | Facility: CLINIC | Age: 71
End: 2021-05-18

## 2021-05-24 ENCOUNTER — TELEPHONE (OUTPATIENT)
Dept: PULMONOLOGY | Facility: CLINIC | Age: 71
End: 2021-05-24

## 2021-05-25 ENCOUNTER — OFFICE VISIT (OUTPATIENT)
Dept: PULMONOLOGY | Facility: CLINIC | Age: 71
End: 2021-05-25
Payer: MEDICARE

## 2021-05-25 VITALS
DIASTOLIC BLOOD PRESSURE: 64 MMHG | HEIGHT: 68 IN | BODY MASS INDEX: 46.76 KG/M2 | WEIGHT: 308.56 LBS | SYSTOLIC BLOOD PRESSURE: 128 MMHG | HEART RATE: 74 BPM | RESPIRATION RATE: 20 BRPM | OXYGEN SATURATION: 94 %

## 2021-05-25 DIAGNOSIS — G47.34 NOCTURNAL HYPOXEMIA: ICD-10-CM

## 2021-05-25 DIAGNOSIS — G47.33 OSA TREATED WITH BIPAP: ICD-10-CM

## 2021-05-25 DIAGNOSIS — I50.1 CARDIAC ASTHMA: Primary | ICD-10-CM

## 2021-05-25 DIAGNOSIS — R60.9 CHRONIC EDEMA: ICD-10-CM

## 2021-05-25 DIAGNOSIS — J45.20 MILD INTERMITTENT ASTHMA, UNSPECIFIED WHETHER COMPLICATED: ICD-10-CM

## 2021-05-25 DIAGNOSIS — E66.01 MORBID OBESITY WITH BMI OF 50.0-59.9, ADULT: ICD-10-CM

## 2021-05-25 DIAGNOSIS — R06.09 DYSPNEA ON EXERTION: ICD-10-CM

## 2021-05-25 PROCEDURE — 99999 PR PBB SHADOW E&M-EST. PATIENT-LVL III: ICD-10-PCS | Mod: PBBFAC,,, | Performed by: INTERNAL MEDICINE

## 2021-05-25 PROCEDURE — 99215 PR OFFICE/OUTPT VISIT, EST, LEVL V, 40-54 MIN: ICD-10-PCS | Mod: S$PBB,,, | Performed by: INTERNAL MEDICINE

## 2021-05-25 PROCEDURE — 99213 OFFICE O/P EST LOW 20 MIN: CPT | Mod: PBBFAC | Performed by: INTERNAL MEDICINE

## 2021-05-25 PROCEDURE — 99999 PR PBB SHADOW E&M-EST. PATIENT-LVL III: CPT | Mod: PBBFAC,,, | Performed by: INTERNAL MEDICINE

## 2021-05-25 PROCEDURE — 99215 OFFICE O/P EST HI 40 MIN: CPT | Mod: S$PBB,,, | Performed by: INTERNAL MEDICINE

## 2021-05-25 RX ORDER — ALBUTEROL SULFATE 90 UG/1
2 AEROSOL, METERED RESPIRATORY (INHALATION) EVERY 4 HOURS PRN
Qty: 18 G | Refills: 11 | Status: SHIPPED | OUTPATIENT
Start: 2021-05-25 | End: 2022-04-12 | Stop reason: SDUPTHER

## 2021-05-31 ENCOUNTER — EXTERNAL CHRONIC CARE MANAGEMENT (OUTPATIENT)
Dept: PRIMARY CARE CLINIC | Facility: CLINIC | Age: 71
End: 2021-05-31
Payer: MEDICARE

## 2021-05-31 PROCEDURE — 99490 PR CHRONIC CARE MGMT, 1ST 20 MIN: ICD-10-PCS | Mod: S$PBB,,, | Performed by: FAMILY MEDICINE

## 2021-05-31 PROCEDURE — 99490 CHRNC CARE MGMT STAFF 1ST 20: CPT | Mod: S$PBB,,, | Performed by: FAMILY MEDICINE

## 2021-05-31 PROCEDURE — 99490 CHRNC CARE MGMT STAFF 1ST 20: CPT | Mod: PBBFAC,PO | Performed by: FAMILY MEDICINE

## 2021-06-06 ENCOUNTER — PATIENT MESSAGE (OUTPATIENT)
Dept: FAMILY MEDICINE | Facility: CLINIC | Age: 71
End: 2021-06-06

## 2021-06-16 ENCOUNTER — TELEPHONE (OUTPATIENT)
Dept: PULMONOLOGY | Facility: CLINIC | Age: 71
End: 2021-06-16

## 2021-06-17 ENCOUNTER — CLINICAL SUPPORT (OUTPATIENT)
Dept: PULMONOLOGY | Facility: CLINIC | Age: 71
End: 2021-06-17
Payer: MEDICARE

## 2021-06-17 VITALS — WEIGHT: 307.19 LBS | BODY MASS INDEX: 46.56 KG/M2 | HEIGHT: 68 IN

## 2021-06-17 DIAGNOSIS — R06.09 DOE (DYSPNEA ON EXERTION): ICD-10-CM

## 2021-06-17 DIAGNOSIS — I27.20 PULMONARY HYPERTENSION: ICD-10-CM

## 2021-06-17 LAB
BRPFT: ABNORMAL
DLCO ADJ PRE: 20.31 ML/(MIN*MMHG) (ref 19.06–32.91)
DLCO SINGLE BREATH LLN: 19.06
DLCO SINGLE BREATH PRE REF: 78.1 %
DLCO SINGLE BREATH REF: 25.99
DLCOC SBVA LLN: 2.63
DLCOC SBVA PRE REF: 133.6 %
DLCOC SBVA REF: 3.85
DLCOC SINGLE BREATH LLN: 19.06
DLCOC SINGLE BREATH PRE REF: 78.1 %
DLCOC SINGLE BREATH REF: 25.99
DLCOVA LLN: 2.63
DLCOVA PRE REF: 133.6 %
DLCOVA PRE: 5.15 ML/(MIN*MMHG*L) (ref 2.63–5.08)
DLCOVA REF: 3.85
DLVAADJ PRE: 5.15 ML/(MIN*MMHG*L) (ref 2.63–5.08)
ERV LLN: -16448.96
ERV PRE REF: 27.3 %
ERV REF: 1.04
FEF 25 75 CHG: 15.3 %
FEF 25 75 LLN: 1.05
FEF 25 75 POST REF: 41.1 %
FEF 25 75 PRE REF: 35.7 %
FEF 25 75 REF: 2.39
FET100 CHG: -19.4 %
FEV1 CHG: 2 %
FEV1 FVC CHG: 3.1 %
FEV1 FVC LLN: 63
FEV1 FVC POST REF: 90.4 %
FEV1 FVC PRE REF: 87.7 %
FEV1 FVC REF: 76
FEV1 LLN: 2.23
FEV1 POST REF: 54.3 %
FEV1 PRE REF: 53.3 %
FEV1 REF: 3.06
FRCPLETH LLN: 2.58
FRCPLETH PREREF: 91.1 %
FRCPLETH REF: 3.57
FVC CHG: -1.1 %
FVC LLN: 2.99
FVC POST REF: 59.9 %
FVC PRE REF: 60.6 %
FVC REF: 4.02
IVC PRE: 2.19 L (ref 2.99–5.05)
IVC SINGLE BREATH LLN: 2.99
IVC SINGLE BREATH PRE REF: 54.6 %
IVC SINGLE BREATH REF: 4.02
MVV LLN: 101
MVV PRE REF: 53.7 %
MVV REF: 119
PEF CHG: 0.5 %
PEF LLN: 5.94
PEF POST REF: 63.1 %
PEF PRE REF: 62.7 %
PEF REF: 8.13
POST FEF 25 75: 0.98 L/S (ref 1.05–3.72)
POST FET 100: 8.69 SEC
POST FEV1 FVC: 69.12 % (ref 63.19–89.76)
POST FEV1: 1.67 L (ref 2.23–3.9)
POST FVC: 2.41 L (ref 2.99–5.05)
POST PEF: 5.13 L/S (ref 5.94–10.33)
PRE DLCO: 20.31 ML/(MIN*MMHG) (ref 19.06–32.91)
PRE ERV: 0.28 L (ref -16448.96–16451.04)
PRE FEF 25 75: 0.85 L/S (ref 1.05–3.72)
PRE FET 100: 10.78 SEC
PRE FEV1 FVC: 67.04 % (ref 63.19–89.76)
PRE FEV1: 1.63 L (ref 2.23–3.9)
PRE FRC PL: 3.25 L
PRE FVC: 2.44 L (ref 2.99–5.05)
PRE MVV: 64 L/MIN (ref 101.22–136.94)
PRE PEF: 5.1 L/S (ref 5.94–10.33)
PRE RV: 2.95 L (ref 1.86–3.21)
PRE TLC: 5.89 L (ref 5.59–7.89)
RAW LLN: 3.06
RAW PRE REF: 133.5 %
RAW PRE: 4.08 CMH2O*S/L (ref 3.06–3.06)
RAW REF: 3.06
RV LLN: 1.86
RV PRE REF: 116.6 %
RV REF: 2.53
RVTLC LLN: 31
RVTLC PRE REF: 123.8 %
RVTLC PRE: 50.11 % (ref 31.5–49.46)
RVTLC REF: 40
TLC LLN: 5.59
TLC PRE REF: 87.4 %
TLC REF: 6.74
VA PRE: 3.95 L (ref 6.59–6.59)
VA SINGLE BREATH LLN: 6.59
VA SINGLE BREATH PRE REF: 59.9 %
VA SINGLE BREATH REF: 6.59
VC LLN: 2.99
VC PRE REF: 73.2 %
VC PRE: 2.94 L (ref 2.99–5.05)
VC REF: 4.02
VTGRAWPRE: 3.71 L

## 2021-06-17 PROCEDURE — 94726 PLETHYSMOGRAPHY LUNG VOLUMES: CPT | Mod: 26,S$PBB,, | Performed by: INTERNAL MEDICINE

## 2021-06-17 PROCEDURE — 94618 PULMONARY STRESS TESTING: ICD-10-PCS | Mod: 26,S$PBB,, | Performed by: INTERNAL MEDICINE

## 2021-06-17 PROCEDURE — 94060 EVALUATION OF WHEEZING: CPT | Mod: 26,59,S$PBB, | Performed by: INTERNAL MEDICINE

## 2021-06-17 PROCEDURE — 94726 PULM FUNCT TST PLETHYSMOGRAP: ICD-10-PCS | Mod: 26,S$PBB,, | Performed by: INTERNAL MEDICINE

## 2021-06-17 PROCEDURE — 94729 DIFFUSING CAPACITY: CPT | Mod: 26,S$PBB,, | Performed by: INTERNAL MEDICINE

## 2021-06-17 PROCEDURE — 99211 OFF/OP EST MAY X REQ PHY/QHP: CPT | Mod: PBBFAC,25

## 2021-06-17 PROCEDURE — 94618 PULMONARY STRESS TESTING: CPT | Mod: 26,S$PBB,, | Performed by: INTERNAL MEDICINE

## 2021-06-17 PROCEDURE — 94060 PR EVAL OF BRONCHOSPASM: ICD-10-PCS | Mod: 26,59,S$PBB, | Performed by: INTERNAL MEDICINE

## 2021-06-17 PROCEDURE — 99999 PR PBB SHADOW E&M-EST. PATIENT-LVL I: ICD-10-PCS | Mod: PBBFAC,,,

## 2021-06-17 PROCEDURE — 94729 PR C02/MEMBANE DIFFUSE CAPACITY: ICD-10-PCS | Mod: 26,S$PBB,, | Performed by: INTERNAL MEDICINE

## 2021-06-17 PROCEDURE — 94729 DIFFUSING CAPACITY: CPT | Mod: PBBFAC

## 2021-06-17 PROCEDURE — 94726 PLETHYSMOGRAPHY LUNG VOLUMES: CPT | Mod: PBBFAC

## 2021-06-17 PROCEDURE — 94618 PULMONARY STRESS TESTING: CPT | Mod: PBBFAC

## 2021-06-17 PROCEDURE — 94060 EVALUATION OF WHEEZING: CPT | Mod: PBBFAC

## 2021-06-17 PROCEDURE — 99999 PR PBB SHADOW E&M-EST. PATIENT-LVL I: CPT | Mod: PBBFAC,,,

## 2021-06-30 ENCOUNTER — EXTERNAL CHRONIC CARE MANAGEMENT (OUTPATIENT)
Dept: PRIMARY CARE CLINIC | Facility: CLINIC | Age: 71
End: 2021-06-30
Payer: MEDICARE

## 2021-06-30 PROCEDURE — 99489 PR COMPLX CHRON CARE MGMT, EA ADDTL 30 MIN, PER MONTH: ICD-10-PCS | Mod: S$PBB,,, | Performed by: FAMILY MEDICINE

## 2021-06-30 PROCEDURE — 99487 CPLX CHRNC CARE 1ST 60 MIN: CPT | Mod: S$PBB,,, | Performed by: FAMILY MEDICINE

## 2021-06-30 PROCEDURE — 99487 PR COMPLX CHRON CARE MGMT, 1ST HR, PER MONTH: ICD-10-PCS | Mod: S$PBB,,, | Performed by: FAMILY MEDICINE

## 2021-06-30 PROCEDURE — 99489 CPLX CHRNC CARE EA ADDL 30: CPT | Mod: S$PBB,,, | Performed by: FAMILY MEDICINE

## 2021-06-30 PROCEDURE — 99457 RPM TX MGMT 1ST 20 MIN: CPT | Mod: S$PBB,,, | Performed by: FAMILY MEDICINE

## 2021-06-30 PROCEDURE — 99457 PR MONITORING, PHYSIOL PARAM, REMOTE, 1ST 20 MINS, PER MONTH: ICD-10-PCS | Mod: S$PBB,,, | Performed by: FAMILY MEDICINE

## 2021-06-30 PROCEDURE — 99489 CPLX CHRNC CARE EA ADDL 30: CPT | Mod: PBBFAC,PO | Performed by: FAMILY MEDICINE

## 2021-06-30 PROCEDURE — 99487 CPLX CHRNC CARE 1ST 60 MIN: CPT | Mod: PBBFAC,25,PO | Performed by: FAMILY MEDICINE

## 2021-07-06 ENCOUNTER — PATIENT MESSAGE (OUTPATIENT)
Dept: FAMILY MEDICINE | Facility: CLINIC | Age: 71
End: 2021-07-06

## 2021-07-06 ENCOUNTER — OFFICE VISIT (OUTPATIENT)
Dept: SLEEP MEDICINE | Facility: CLINIC | Age: 71
End: 2021-07-06
Payer: MEDICARE

## 2021-07-06 VITALS
WEIGHT: 295.19 LBS | DIASTOLIC BLOOD PRESSURE: 80 MMHG | HEART RATE: 60 BPM | SYSTOLIC BLOOD PRESSURE: 126 MMHG | OXYGEN SATURATION: 95 % | BODY MASS INDEX: 44.74 KG/M2 | RESPIRATION RATE: 18 BRPM | HEIGHT: 68 IN

## 2021-07-06 DIAGNOSIS — G47.33 OSA TREATED WITH BIPAP: ICD-10-CM

## 2021-07-06 DIAGNOSIS — F51.12 BEHAVIORALLY INDUCED INSUFFICIENT SLEEP SYNDROME: ICD-10-CM

## 2021-07-06 DIAGNOSIS — E66.01 OBESITY, CLASS III, BMI 40-49.9 (MORBID OBESITY): ICD-10-CM

## 2021-07-06 DIAGNOSIS — G47.61 PERIODIC LIMB MOVEMENT DISORDER (PLMD): ICD-10-CM

## 2021-07-06 DIAGNOSIS — I27.20 PULMONARY HYPERTENSION: Primary | ICD-10-CM

## 2021-07-06 DIAGNOSIS — I50.9 CONGESTIVE HEART FAILURE, UNSPECIFIED HF CHRONICITY, UNSPECIFIED HEART FAILURE TYPE: ICD-10-CM

## 2021-07-06 DIAGNOSIS — R60.9 EDEMA, UNSPECIFIED TYPE: ICD-10-CM

## 2021-07-06 DIAGNOSIS — I10 ESSENTIAL HYPERTENSION: ICD-10-CM

## 2021-07-06 PROCEDURE — 99214 OFFICE O/P EST MOD 30 MIN: CPT | Mod: PBBFAC | Performed by: NURSE PRACTITIONER

## 2021-07-06 PROCEDURE — 99999 PR PBB SHADOW E&M-EST. PATIENT-LVL IV: CPT | Mod: PBBFAC,,, | Performed by: NURSE PRACTITIONER

## 2021-07-06 PROCEDURE — 99214 OFFICE O/P EST MOD 30 MIN: CPT | Mod: S$PBB,,, | Performed by: NURSE PRACTITIONER

## 2021-07-06 PROCEDURE — 99999 PR PBB SHADOW E&M-EST. PATIENT-LVL IV: ICD-10-PCS | Mod: PBBFAC,,, | Performed by: NURSE PRACTITIONER

## 2021-07-06 PROCEDURE — 99214 PR OFFICE/OUTPT VISIT, EST, LEVL IV, 30-39 MIN: ICD-10-PCS | Mod: S$PBB,,, | Performed by: NURSE PRACTITIONER

## 2021-07-06 RX ORDER — TORSEMIDE 20 MG/1
20 TABLET ORAL DAILY
Qty: 90 TABLET | Refills: 0 | Status: SHIPPED | OUTPATIENT
Start: 2021-07-06 | End: 2021-08-04 | Stop reason: SDUPTHER

## 2021-07-31 ENCOUNTER — EXTERNAL CHRONIC CARE MANAGEMENT (OUTPATIENT)
Dept: PRIMARY CARE CLINIC | Facility: CLINIC | Age: 71
End: 2021-07-31
Payer: MEDICARE

## 2021-07-31 PROCEDURE — 99490 CHRNC CARE MGMT STAFF 1ST 20: CPT | Mod: PBBFAC,PO | Performed by: FAMILY MEDICINE

## 2021-07-31 PROCEDURE — 99490 CHRNC CARE MGMT STAFF 1ST 20: CPT | Mod: S$PBB,,, | Performed by: FAMILY MEDICINE

## 2021-07-31 PROCEDURE — 99490 PR CHRONIC CARE MGMT, 1ST 20 MIN: ICD-10-PCS | Mod: S$PBB,,, | Performed by: FAMILY MEDICINE

## 2021-08-04 ENCOUNTER — OFFICE VISIT (OUTPATIENT)
Dept: FAMILY MEDICINE | Facility: CLINIC | Age: 71
End: 2021-08-04
Payer: MEDICARE

## 2021-08-04 VITALS
WEIGHT: 300.81 LBS | TEMPERATURE: 97 F | SYSTOLIC BLOOD PRESSURE: 138 MMHG | OXYGEN SATURATION: 96 % | BODY MASS INDEX: 45.59 KG/M2 | HEIGHT: 68 IN | RESPIRATION RATE: 16 BRPM | HEART RATE: 60 BPM | DIASTOLIC BLOOD PRESSURE: 62 MMHG

## 2021-08-04 DIAGNOSIS — H61.20 IMPACTED CERUMEN, UNSPECIFIED LATERALITY: ICD-10-CM

## 2021-08-04 DIAGNOSIS — Z76.0 MEDICATION REFILL: ICD-10-CM

## 2021-08-04 DIAGNOSIS — Z79.899 ENCOUNTER FOR MEDICATION REVIEW: ICD-10-CM

## 2021-08-04 PROCEDURE — 99215 OFFICE O/P EST HI 40 MIN: CPT | Mod: PBBFAC,PO | Performed by: NURSE PRACTITIONER

## 2021-08-04 PROCEDURE — 99999 PR PBB SHADOW E&M-EST. PATIENT-LVL V: CPT | Mod: PBBFAC,,, | Performed by: NURSE PRACTITIONER

## 2021-08-04 PROCEDURE — 99999 PR PBB SHADOW E&M-EST. PATIENT-LVL V: ICD-10-PCS | Mod: PBBFAC,,, | Performed by: NURSE PRACTITIONER

## 2021-08-04 PROCEDURE — 99213 OFFICE O/P EST LOW 20 MIN: CPT | Mod: S$PBB,,, | Performed by: NURSE PRACTITIONER

## 2021-08-04 PROCEDURE — 99213 PR OFFICE/OUTPT VISIT, EST, LEVL III, 20-29 MIN: ICD-10-PCS | Mod: S$PBB,,, | Performed by: NURSE PRACTITIONER

## 2021-08-04 RX ORDER — TORSEMIDE 20 MG/1
20 TABLET ORAL DAILY
Qty: 90 TABLET | Refills: 0 | Status: SHIPPED | OUTPATIENT
Start: 2021-08-04 | End: 2021-10-04 | Stop reason: SDUPTHER

## 2021-08-04 RX ORDER — METOPROLOL TARTRATE 50 MG/1
50 TABLET ORAL 2 TIMES DAILY
Qty: 180 TABLET | Refills: 3 | Status: SHIPPED | OUTPATIENT
Start: 2021-08-04 | End: 2021-10-04 | Stop reason: SDUPTHER

## 2021-08-04 RX ORDER — IRBESARTAN 300 MG/1
300 TABLET ORAL NIGHTLY
Qty: 90 TABLET | Refills: 3 | Status: SHIPPED | OUTPATIENT
Start: 2021-08-04 | End: 2021-10-04 | Stop reason: SDUPTHER

## 2021-08-04 RX ORDER — ATORVASTATIN CALCIUM 40 MG/1
40 TABLET, FILM COATED ORAL DAILY
Qty: 90 TABLET | Refills: 3 | Status: SHIPPED | OUTPATIENT
Start: 2021-08-04 | End: 2021-10-04 | Stop reason: SDUPTHER

## 2021-08-31 ENCOUNTER — EXTERNAL CHRONIC CARE MANAGEMENT (OUTPATIENT)
Dept: PRIMARY CARE CLINIC | Facility: CLINIC | Age: 71
End: 2021-08-31
Payer: MEDICARE

## 2021-08-31 PROCEDURE — 99490 PR CHRONIC CARE MGMT, 1ST 20 MIN: ICD-10-PCS | Mod: S$PBB,,, | Performed by: FAMILY MEDICINE

## 2021-08-31 PROCEDURE — 99490 CHRNC CARE MGMT STAFF 1ST 20: CPT | Mod: PBBFAC,PO | Performed by: FAMILY MEDICINE

## 2021-08-31 PROCEDURE — 99490 CHRNC CARE MGMT STAFF 1ST 20: CPT | Mod: S$PBB,,, | Performed by: FAMILY MEDICINE

## 2021-09-22 ENCOUNTER — PATIENT MESSAGE (OUTPATIENT)
Dept: FAMILY MEDICINE | Facility: CLINIC | Age: 71
End: 2021-09-22

## 2021-09-22 ENCOUNTER — TELEPHONE (OUTPATIENT)
Dept: FAMILY MEDICINE | Facility: CLINIC | Age: 71
End: 2021-09-22

## 2021-09-22 DIAGNOSIS — I10 ESSENTIAL HYPERTENSION: Primary | ICD-10-CM

## 2021-09-22 DIAGNOSIS — Z12.5 ENCOUNTER FOR PROSTATE CANCER SCREENING: ICD-10-CM

## 2021-09-30 ENCOUNTER — EXTERNAL CHRONIC CARE MANAGEMENT (OUTPATIENT)
Dept: PRIMARY CARE CLINIC | Facility: CLINIC | Age: 71
End: 2021-09-30
Payer: MEDICARE

## 2021-09-30 PROCEDURE — 99490 CHRNC CARE MGMT STAFF 1ST 20: CPT | Mod: PBBFAC,PO | Performed by: FAMILY MEDICINE

## 2021-09-30 PROCEDURE — 99439 CHRNC CARE MGMT STAF EA ADDL: CPT | Mod: S$PBB,,, | Performed by: FAMILY MEDICINE

## 2021-09-30 PROCEDURE — 99490 CHRNC CARE MGMT STAFF 1ST 20: CPT | Mod: S$PBB,,, | Performed by: FAMILY MEDICINE

## 2021-09-30 PROCEDURE — 99439 CHRNC CARE MGMT STAF EA ADDL: CPT | Mod: PBBFAC,PO | Performed by: FAMILY MEDICINE

## 2021-09-30 PROCEDURE — 99439 PR CHRONIC CARE MGMT, EA ADDTL 20 MIN: ICD-10-PCS | Mod: S$PBB,,, | Performed by: FAMILY MEDICINE

## 2021-09-30 PROCEDURE — 99490 PR CHRONIC CARE MGMT, 1ST 20 MIN: ICD-10-PCS | Mod: S$PBB,,, | Performed by: FAMILY MEDICINE

## 2021-10-04 ENCOUNTER — LAB VISIT (OUTPATIENT)
Dept: LAB | Facility: HOSPITAL | Age: 71
End: 2021-10-04
Attending: FAMILY MEDICINE
Payer: MEDICARE

## 2021-10-04 ENCOUNTER — OFFICE VISIT (OUTPATIENT)
Dept: FAMILY MEDICINE | Facility: CLINIC | Age: 71
End: 2021-10-04
Payer: MEDICARE

## 2021-10-04 VITALS
HEART RATE: 68 BPM | DIASTOLIC BLOOD PRESSURE: 72 MMHG | WEIGHT: 297 LBS | TEMPERATURE: 98 F | SYSTOLIC BLOOD PRESSURE: 134 MMHG | HEIGHT: 68 IN | BODY MASS INDEX: 45.01 KG/M2

## 2021-10-04 DIAGNOSIS — Z12.5 ENCOUNTER FOR PROSTATE CANCER SCREENING: ICD-10-CM

## 2021-10-04 DIAGNOSIS — I10 ESSENTIAL HYPERTENSION: ICD-10-CM

## 2021-10-04 DIAGNOSIS — Z76.0 MEDICATION REFILL: ICD-10-CM

## 2021-10-04 LAB
ALBUMIN SERPL BCP-MCNC: 3.6 G/DL (ref 3.5–5.2)
ALP SERPL-CCNC: 72 U/L (ref 55–135)
ALT SERPL W/O P-5'-P-CCNC: 30 U/L (ref 10–44)
ANION GAP SERPL CALC-SCNC: 13 MMOL/L (ref 8–16)
AST SERPL-CCNC: 26 U/L (ref 10–40)
BILIRUB SERPL-MCNC: 0.7 MG/DL (ref 0.1–1)
BUN SERPL-MCNC: 13 MG/DL (ref 8–23)
CALCIUM SERPL-MCNC: 9.7 MG/DL (ref 8.7–10.5)
CHLORIDE SERPL-SCNC: 100 MMOL/L (ref 95–110)
CHOLEST SERPL-MCNC: 121 MG/DL (ref 120–199)
CHOLEST/HDLC SERPL: 3 {RATIO} (ref 2–5)
CO2 SERPL-SCNC: 26 MMOL/L (ref 23–29)
COMPLEXED PSA SERPL-MCNC: 0.23 NG/ML (ref 0–4)
CREAT SERPL-MCNC: 0.8 MG/DL (ref 0.5–1.4)
EST. GFR  (AFRICAN AMERICAN): >60 ML/MIN/1.73 M^2
EST. GFR  (NON AFRICAN AMERICAN): >60 ML/MIN/1.73 M^2
GLUCOSE SERPL-MCNC: 93 MG/DL (ref 70–110)
HDLC SERPL-MCNC: 40 MG/DL (ref 40–75)
HDLC SERPL: 33.1 % (ref 20–50)
LDLC SERPL CALC-MCNC: 49.4 MG/DL (ref 63–159)
NONHDLC SERPL-MCNC: 81 MG/DL
POTASSIUM SERPL-SCNC: 4.6 MMOL/L (ref 3.5–5.1)
PROT SERPL-MCNC: 7.6 G/DL (ref 6–8.4)
SODIUM SERPL-SCNC: 139 MMOL/L (ref 136–145)
TRIGL SERPL-MCNC: 158 MG/DL (ref 30–150)

## 2021-10-04 PROCEDURE — 99213 PR OFFICE/OUTPT VISIT, EST, LEVL III, 20-29 MIN: ICD-10-PCS | Mod: S$PBB,,, | Performed by: FAMILY MEDICINE

## 2021-10-04 PROCEDURE — 80061 LIPID PANEL: CPT | Performed by: FAMILY MEDICINE

## 2021-10-04 PROCEDURE — 80053 COMPREHEN METABOLIC PANEL: CPT | Performed by: FAMILY MEDICINE

## 2021-10-04 PROCEDURE — 84153 ASSAY OF PSA TOTAL: CPT | Performed by: FAMILY MEDICINE

## 2021-10-04 PROCEDURE — 99999 PR PBB SHADOW E&M-EST. PATIENT-LVL III: CPT | Mod: PBBFAC,,, | Performed by: FAMILY MEDICINE

## 2021-10-04 PROCEDURE — 99213 OFFICE O/P EST LOW 20 MIN: CPT | Mod: PBBFAC,PO | Performed by: FAMILY MEDICINE

## 2021-10-04 PROCEDURE — 36415 COLL VENOUS BLD VENIPUNCTURE: CPT | Mod: PO | Performed by: FAMILY MEDICINE

## 2021-10-04 PROCEDURE — 99213 OFFICE O/P EST LOW 20 MIN: CPT | Mod: S$PBB,,, | Performed by: FAMILY MEDICINE

## 2021-10-04 PROCEDURE — 99999 PR PBB SHADOW E&M-EST. PATIENT-LVL III: ICD-10-PCS | Mod: PBBFAC,,, | Performed by: FAMILY MEDICINE

## 2021-10-04 RX ORDER — IRBESARTAN 300 MG/1
300 TABLET ORAL NIGHTLY
Qty: 90 TABLET | Refills: 3 | Status: SHIPPED | OUTPATIENT
Start: 2021-10-04 | End: 2022-10-24 | Stop reason: SDUPTHER

## 2021-10-04 RX ORDER — METOPROLOL TARTRATE 50 MG/1
50 TABLET ORAL 2 TIMES DAILY
Qty: 180 TABLET | Refills: 3 | Status: SHIPPED | OUTPATIENT
Start: 2021-10-04 | End: 2022-10-24 | Stop reason: SDUPTHER

## 2021-10-04 RX ORDER — ATORVASTATIN CALCIUM 40 MG/1
40 TABLET, FILM COATED ORAL DAILY
Qty: 90 TABLET | Refills: 3 | Status: SHIPPED | OUTPATIENT
Start: 2021-10-04 | End: 2022-10-24 | Stop reason: SDUPTHER

## 2021-10-04 RX ORDER — TORSEMIDE 20 MG/1
20 TABLET ORAL DAILY
Qty: 90 TABLET | Refills: 3 | Status: SHIPPED | OUTPATIENT
Start: 2021-10-04 | End: 2022-04-12 | Stop reason: SDUPTHER

## 2021-10-15 ENCOUNTER — TELEPHONE (OUTPATIENT)
Dept: FAMILY MEDICINE | Facility: CLINIC | Age: 71
End: 2021-10-15

## 2021-10-15 DIAGNOSIS — Z20.822 SUSPECTED COVID-19 VIRUS INFECTION: Primary | ICD-10-CM

## 2021-10-17 ENCOUNTER — PATIENT MESSAGE (OUTPATIENT)
Dept: FAMILY MEDICINE | Facility: CLINIC | Age: 71
End: 2021-10-17
Payer: COMMERCIAL

## 2021-10-17 DIAGNOSIS — U07.1 COVID-19: Primary | ICD-10-CM

## 2021-10-18 ENCOUNTER — INFUSION (OUTPATIENT)
Dept: INFECTIOUS DISEASES | Facility: HOSPITAL | Age: 71
End: 2021-10-18
Attending: INTERNAL MEDICINE
Payer: MEDICARE

## 2021-10-18 VITALS
HEART RATE: 64 BPM | TEMPERATURE: 98 F | OXYGEN SATURATION: 95 % | DIASTOLIC BLOOD PRESSURE: 58 MMHG | RESPIRATION RATE: 18 BRPM | SYSTOLIC BLOOD PRESSURE: 124 MMHG

## 2021-10-18 DIAGNOSIS — U07.1 COVID-19: Primary | ICD-10-CM

## 2021-10-18 DIAGNOSIS — U07.1 COVID-19: ICD-10-CM

## 2021-10-18 PROCEDURE — 63600175 PHARM REV CODE 636 W HCPCS: Performed by: INTERNAL MEDICINE

## 2021-10-18 PROCEDURE — M0243 CASIRIVI AND IMDEVI INFUSION: HCPCS | Performed by: INTERNAL MEDICINE

## 2021-10-18 PROCEDURE — 25000003 PHARM REV CODE 250: Performed by: INTERNAL MEDICINE

## 2021-10-18 RX ORDER — ACETAMINOPHEN 325 MG/1
650 TABLET ORAL ONCE AS NEEDED
Status: ACTIVE | OUTPATIENT
Start: 2021-10-18 | End: 2033-03-16

## 2021-10-18 RX ORDER — DIPHENHYDRAMINE HYDROCHLORIDE 50 MG/ML
25 INJECTION INTRAMUSCULAR; INTRAVENOUS ONCE AS NEEDED
Status: ACTIVE | OUTPATIENT
Start: 2021-10-18 | End: 2033-03-16

## 2021-10-18 RX ORDER — ALBUTEROL SULFATE 90 UG/1
2 AEROSOL, METERED RESPIRATORY (INHALATION)
Status: DISCONTINUED | OUTPATIENT
Start: 2021-10-18 | End: 2022-04-12

## 2021-10-18 RX ORDER — ONDANSETRON 4 MG/1
4 TABLET, ORALLY DISINTEGRATING ORAL ONCE AS NEEDED
Status: DISCONTINUED | OUTPATIENT
Start: 2021-10-18 | End: 2022-12-06

## 2021-10-18 RX ORDER — SODIUM CHLORIDE 0.9 % (FLUSH) 0.9 %
10 SYRINGE (ML) INJECTION
Status: ACTIVE | OUTPATIENT
Start: 2021-10-18

## 2021-10-18 RX ORDER — EPINEPHRINE 0.3 MG/.3ML
0.3 INJECTION SUBCUTANEOUS
Status: DISCONTINUED | OUTPATIENT
Start: 2021-10-18 | End: 2022-12-06

## 2021-10-18 RX ADMIN — CASIRIVIMAB AND IMDEVIMAB 600 MG: 600; 600 INJECTION, SOLUTION, CONCENTRATE INTRAVENOUS at 01:10

## 2021-10-31 ENCOUNTER — EXTERNAL CHRONIC CARE MANAGEMENT (OUTPATIENT)
Dept: PRIMARY CARE CLINIC | Facility: CLINIC | Age: 71
End: 2021-10-31
Payer: MEDICARE

## 2021-10-31 PROCEDURE — 99490 PR CHRONIC CARE MGMT, 1ST 20 MIN: ICD-10-PCS | Mod: S$PBB,,, | Performed by: FAMILY MEDICINE

## 2021-10-31 PROCEDURE — 99439 CHRNC CARE MGMT STAF EA ADDL: CPT | Mod: S$PBB,,, | Performed by: FAMILY MEDICINE

## 2021-10-31 PROCEDURE — 99439 CHRNC CARE MGMT STAF EA ADDL: CPT | Mod: PBBFAC,PO | Performed by: FAMILY MEDICINE

## 2021-10-31 PROCEDURE — 99490 CHRNC CARE MGMT STAFF 1ST 20: CPT | Mod: PBBFAC,25,PO | Performed by: FAMILY MEDICINE

## 2021-10-31 PROCEDURE — 99490 CHRNC CARE MGMT STAFF 1ST 20: CPT | Mod: S$PBB,,, | Performed by: FAMILY MEDICINE

## 2021-10-31 PROCEDURE — 99439 PR CHRONIC CARE MGMT, EA ADDTL 20 MIN: ICD-10-PCS | Mod: S$PBB,,, | Performed by: FAMILY MEDICINE

## 2021-11-30 ENCOUNTER — EXTERNAL CHRONIC CARE MANAGEMENT (OUTPATIENT)
Dept: PRIMARY CARE CLINIC | Facility: CLINIC | Age: 71
End: 2021-11-30
Payer: MEDICARE

## 2021-11-30 PROCEDURE — 99490 PR CHRONIC CARE MGMT, 1ST 20 MIN: ICD-10-PCS | Mod: S$PBB,,, | Performed by: FAMILY MEDICINE

## 2021-11-30 PROCEDURE — 99490 CHRNC CARE MGMT STAFF 1ST 20: CPT | Mod: S$PBB,,, | Performed by: FAMILY MEDICINE

## 2021-11-30 PROCEDURE — 99490 CHRNC CARE MGMT STAFF 1ST 20: CPT | Mod: PBBFAC,PO | Performed by: FAMILY MEDICINE

## 2021-12-21 ENCOUNTER — PATIENT MESSAGE (OUTPATIENT)
Dept: FAMILY MEDICINE | Facility: CLINIC | Age: 71
End: 2021-12-21
Payer: COMMERCIAL

## 2021-12-22 ENCOUNTER — PATIENT MESSAGE (OUTPATIENT)
Dept: FAMILY MEDICINE | Facility: CLINIC | Age: 71
End: 2021-12-22
Payer: COMMERCIAL

## 2021-12-29 ENCOUNTER — TELEPHONE (OUTPATIENT)
Dept: PULMONOLOGY | Facility: CLINIC | Age: 71
End: 2021-12-29
Payer: COMMERCIAL

## 2022-01-30 ENCOUNTER — PATIENT MESSAGE (OUTPATIENT)
Dept: FAMILY MEDICINE | Facility: CLINIC | Age: 72
End: 2022-01-30
Payer: COMMERCIAL

## 2022-02-15 ENCOUNTER — PATIENT MESSAGE (OUTPATIENT)
Dept: FAMILY MEDICINE | Facility: CLINIC | Age: 72
End: 2022-02-15
Payer: COMMERCIAL

## 2022-02-28 ENCOUNTER — PATIENT MESSAGE (OUTPATIENT)
Dept: FAMILY MEDICINE | Facility: CLINIC | Age: 72
End: 2022-02-28
Payer: COMMERCIAL

## 2022-03-01 ENCOUNTER — TELEPHONE (OUTPATIENT)
Dept: ENDOSCOPY | Facility: HOSPITAL | Age: 72
End: 2022-03-01
Payer: COMMERCIAL

## 2022-03-01 NOTE — TELEPHONE ENCOUNTER
His blood pressure continues to be high.      2/28/2022 2/28/2022 2/28/2022 2/28/2022 2/21/2022 2/17/2022 2/16/2022   Time 10:01 PM  9:51 PM  5:57 AM  5:53 AM  9:48 AM  3:18 PM  4:52 AM   Systolic Blood Pressure 189(A) 185(A) 176(A) 174(A) 146 157 162(A)   Diastolic Blood Pressure 81 94 88 85 88 68 93     I had messaged him about increasing his LOPRESSOR  From 50 mg twice a day to 100 mg twice a day and never heard from him. Please connect w him and ask him to do this and schedule a telemed virtual visit in 2 weeks and get him to continue checking his blood pressures and entering them.

## 2022-03-16 ENCOUNTER — LAB VISIT (OUTPATIENT)
Dept: LAB | Facility: HOSPITAL | Age: 72
End: 2022-03-16
Attending: FAMILY MEDICINE
Payer: MEDICARE

## 2022-03-16 ENCOUNTER — OFFICE VISIT (OUTPATIENT)
Dept: FAMILY MEDICINE | Facility: CLINIC | Age: 72
End: 2022-03-16
Payer: MEDICARE

## 2022-03-16 VITALS
HEIGHT: 68 IN | TEMPERATURE: 98 F | DIASTOLIC BLOOD PRESSURE: 70 MMHG | SYSTOLIC BLOOD PRESSURE: 138 MMHG | HEART RATE: 64 BPM | WEIGHT: 294 LBS | BODY MASS INDEX: 44.56 KG/M2

## 2022-03-16 DIAGNOSIS — G89.29 CHRONIC PAIN OF BOTH KNEES: Primary | ICD-10-CM

## 2022-03-16 DIAGNOSIS — R53.83 FATIGUE, UNSPECIFIED TYPE: ICD-10-CM

## 2022-03-16 DIAGNOSIS — M54.9 DORSALGIA, UNSPECIFIED: ICD-10-CM

## 2022-03-16 DIAGNOSIS — M25.562 CHRONIC PAIN OF BOTH KNEES: Primary | ICD-10-CM

## 2022-03-16 DIAGNOSIS — M51.36 DDD (DEGENERATIVE DISC DISEASE), LUMBAR: ICD-10-CM

## 2022-03-16 DIAGNOSIS — M25.561 CHRONIC PAIN OF BOTH KNEES: Primary | ICD-10-CM

## 2022-03-16 DIAGNOSIS — M54.50 LUMBAR PAIN: ICD-10-CM

## 2022-03-16 LAB
ALBUMIN SERPL BCP-MCNC: 3.8 G/DL (ref 3.5–5.2)
ALP SERPL-CCNC: 85 U/L (ref 55–135)
ALT SERPL W/O P-5'-P-CCNC: 22 U/L (ref 10–44)
ANION GAP SERPL CALC-SCNC: 13 MMOL/L (ref 8–16)
AST SERPL-CCNC: 21 U/L (ref 10–40)
BASOPHILS # BLD AUTO: 0.07 K/UL (ref 0–0.2)
BASOPHILS NFR BLD: 0.6 % (ref 0–1.9)
BILIRUB SERPL-MCNC: 0.5 MG/DL (ref 0.1–1)
BUN SERPL-MCNC: 9 MG/DL (ref 8–23)
CALCIUM SERPL-MCNC: 10.2 MG/DL (ref 8.7–10.5)
CHLORIDE SERPL-SCNC: 96 MMOL/L (ref 95–110)
CO2 SERPL-SCNC: 33 MMOL/L (ref 23–29)
CREAT SERPL-MCNC: 0.9 MG/DL (ref 0.5–1.4)
DIFFERENTIAL METHOD: ABNORMAL
EOSINOPHIL # BLD AUTO: 0.1 K/UL (ref 0–0.5)
EOSINOPHIL NFR BLD: 0.8 % (ref 0–8)
ERYTHROCYTE [DISTWIDTH] IN BLOOD BY AUTOMATED COUNT: 16.3 % (ref 11.5–14.5)
EST. GFR  (AFRICAN AMERICAN): >60 ML/MIN/1.73 M^2
EST. GFR  (NON AFRICAN AMERICAN): >60 ML/MIN/1.73 M^2
GLUCOSE SERPL-MCNC: 83 MG/DL (ref 70–110)
HCT VFR BLD AUTO: 51.9 % (ref 40–54)
HGB BLD-MCNC: 16.1 G/DL (ref 14–18)
IMM GRANULOCYTES # BLD AUTO: 0.05 K/UL (ref 0–0.04)
IMM GRANULOCYTES NFR BLD AUTO: 0.4 % (ref 0–0.5)
LYMPHOCYTES # BLD AUTO: 1.9 K/UL (ref 1–4.8)
LYMPHOCYTES NFR BLD: 16 % (ref 18–48)
MCH RBC QN AUTO: 28.1 PG (ref 27–31)
MCHC RBC AUTO-ENTMCNC: 31 G/DL (ref 32–36)
MCV RBC AUTO: 91 FL (ref 82–98)
MONOCYTES # BLD AUTO: 1 K/UL (ref 0.3–1)
MONOCYTES NFR BLD: 8.6 % (ref 4–15)
NEUTROPHILS # BLD AUTO: 8.7 K/UL (ref 1.8–7.7)
NEUTROPHILS NFR BLD: 73.6 % (ref 38–73)
NRBC BLD-RTO: 0 /100 WBC
PLATELET # BLD AUTO: 297 K/UL (ref 150–450)
PMV BLD AUTO: 11.2 FL (ref 9.2–12.9)
POTASSIUM SERPL-SCNC: 5.2 MMOL/L (ref 3.5–5.1)
PROT SERPL-MCNC: 7.8 G/DL (ref 6–8.4)
RBC # BLD AUTO: 5.73 M/UL (ref 4.6–6.2)
SODIUM SERPL-SCNC: 142 MMOL/L (ref 136–145)
TESTOST SERPL-MCNC: 114 NG/DL (ref 304–1227)
TSH SERPL DL<=0.005 MIU/L-ACNC: 2.95 UIU/ML (ref 0.4–4)
WBC # BLD AUTO: 11.86 K/UL (ref 3.9–12.7)

## 2022-03-16 PROCEDURE — 80053 COMPREHEN METABOLIC PANEL: CPT | Performed by: FAMILY MEDICINE

## 2022-03-16 PROCEDURE — 99999 PR PBB SHADOW E&M-EST. PATIENT-LVL IV: CPT | Mod: PBBFAC,,, | Performed by: FAMILY MEDICINE

## 2022-03-16 PROCEDURE — 99999 PR PBB SHADOW E&M-EST. PATIENT-LVL IV: ICD-10-PCS | Mod: PBBFAC,,, | Performed by: FAMILY MEDICINE

## 2022-03-16 PROCEDURE — 36415 COLL VENOUS BLD VENIPUNCTURE: CPT | Mod: PO | Performed by: FAMILY MEDICINE

## 2022-03-16 PROCEDURE — 84403 ASSAY OF TOTAL TESTOSTERONE: CPT | Performed by: FAMILY MEDICINE

## 2022-03-16 PROCEDURE — 84443 ASSAY THYROID STIM HORMONE: CPT | Performed by: FAMILY MEDICINE

## 2022-03-16 PROCEDURE — 99214 OFFICE O/P EST MOD 30 MIN: CPT | Mod: PBBFAC,PO | Performed by: FAMILY MEDICINE

## 2022-03-16 PROCEDURE — 99214 OFFICE O/P EST MOD 30 MIN: CPT | Mod: S$PBB,,, | Performed by: FAMILY MEDICINE

## 2022-03-16 PROCEDURE — 99214 PR OFFICE/OUTPT VISIT, EST, LEVL IV, 30-39 MIN: ICD-10-PCS | Mod: S$PBB,,, | Performed by: FAMILY MEDICINE

## 2022-03-16 PROCEDURE — 85025 COMPLETE CBC W/AUTO DIFF WBC: CPT | Performed by: FAMILY MEDICINE

## 2022-03-16 NOTE — PROGRESS NOTES
Subjective:      Patient ID: Marvin Chacon Jr. is a 71 y.o. male.    Chief Complaint: Annual Exam    Knee Pain: Patient presents with knee pain involving the  bilateral knee. Onset of the symptoms was several years ago. Inciting event: this is a longstanding problem which has been getting worse. Current symptoms include pain located in the knees diffusely.  . Pain is aggravated by particular movements. .  Patient has had prior knee problems.   Reading Physician Reading Date Result Priority   Juan Dickens DO  [phone] 4/3/2019      Narrative & Impression  EXAMINATION:  XR KNEE ORTHO LEFT     CLINICAL HISTORY:  Pain in left knee     TECHNIQUE:  AP standing views of both knees, AP flexion views of both knees, lateral view of the left knee and Merchant views of both knees     COMPARISON:  03/07/2018     FINDINGS:  There is moderate joint space narrowing and minimal marginal osteophyte formation seen involving the medial compartment of the left knee with mild joint space narrowing and osteophyte formation seen involving the lateral compartment of the left knee.  There is minimal joint space narrowing of the medial compartment of the right knee.  Moderate degenerative change at the left patellofemoral compartment.  No joint effusion.  No acute fracture or dislocation.     IMPRESSION:      1.  As above        Electronically signed by: Juan Dickens DO  Date:                                            04/03/2019  Time:                                           09:00      He now complains of having a weak feeling in the leg if he has been sitting for a long time.  He also has a numbness of the toes at times when he is laying in a specific position.        He has had an MRI of the spine.   MRI Lumbar Spine Without Contrast  Order: [id number]   Status: Final result     Visible to patient: Yes (seen)     Next appt: 04/12/2022 at 08:00 AM in Pulmonology ([facility])     Dx: Back pain; Prolapse, disk     2  Result Notes    Details    Reading Physician Reading Date Result Priority   Marcos Clements IV, MD  [phone] 12/28/2012      Narrative  Routine multiplanar imaging through this 62-year-old males lumbar spine was obtained with the addition of a stir weighted sequence.     The vertebral bodies appear relatively well aligned.  Vertebral body heights appear well preserved.  Mild decreased T2 signal is noted at the L3-L4 L4-L5 and L5-S1 disks consistent with degenerative disk desiccation.  The spinal cord appears to terminate    at the L1 level.  No stir weighted signal abnormality was noted to suggest the presence of an aggressive the marrow placement process or recent fracture.       Osseous spurring is noted at each SI joint with partial ankylosis suspected on the right greater than left.  This is incompletely visualized but may relate to the history of degenerative change or sacroiliitis.  Please clinically correlate.     Anterior osseous spurring is noted at multiple levels including the T11-T12 T12 L1, L1 L2, L3 L4, L4-L5 and L5-S1 levels.     At the T12-L1 level no significant disk bulge, central canal stenosis, or nerve foraminal stenosis is noted.     At the L1-L2 level, mild facet arthropathy and ligamentous hypertrophy is noted.  Minimal disk bulge of approximately 1 mm is noted with no significant central canal stenosis or neural foraminal stenosis  noted.     At the L2-L3 level, facet arthropathy and ligamentous hypertrophy is noted.  Minimal broad-based bulging is noted asymmetrically towards each nerve foramen of approximately 1 to 2 mm.  No significant central canal stenosis is noted.  Minimal   neuroforamina narrowing is noted with the exiting nerve roots appearing to exit unimpinged.     At the L3-L4 level, mild broad-based bulging is noted towards the left neural foramen with an asymmetric protrusive component towards the right nerve foramen and far lateral right that is broad based of  approximately 4 mm.  Mild left neuroforaminal   stenosis is noted with moderate right nerve foraminal stenosis.  There is suspected to be contact of the exiting nerve root on the right.  No significant central canal stenosis is noted.     At the L4-L5 level facet arthropathy and ligamentous hypertrophy are noted.  No significant central canal stenosis is noted.  Asymmetric broad-based bulging is noted far lateral to the right and towards the right nerve foramen with moderate to severe   right-sided neural foraminal stenosis and suspected contact of the exiting nerve root.  Mild left-sided neural foraminal stenosis is noted with the exiting nerve root favored to exit unimpinged.  Facet arthropathy and ligamentous hypertrophy are seen.     At the L5-S1 level, a broad based central disk bulge appears to be present with a asymmetric protrusive component towards the paracentric left of approximately 4 to 5 mm.  Mild bilateral neuroforaminal stenosis noted.  Mild central canal narrowing is   noted.  Left lateral recess stenosis is noted with a nerve root coming near the disk material but without definitive contact.  Impression      1.  The protrusive changes are noted at the L3-L4 L4-L5 and L5-S1 levels as described above most   notable for neuroforaminal stenosis at the L3-L4 and L4-L5 levels on the right.  The protrusion at the L5-S1 level is paracentric to the left and causing   left lateral recess stenosis.     2.  SI joint spurring and ankylosis appears to be present.  This may be degenerative relate to the history of sacroiliitis.         Electronically signed by: Marcos Clements MD   Date: 12/28/12   Time: 11:22              Exam Ended: 12/28/12 09:58              He has been on mobic in the past and he has now been off of it.     He has fatigue. He has had low testosterone treated in the past and he states that it helped but he has not been on that for a long time. He does not have a libido problem  Now.     The  patient's Health Maintenance was reviewed and the following appears to be due at this time:   Health Maintenance Due   Topic Date Due    Shingles Vaccine (1 of 2) Never done    TETANUS VACCINE  01/21/2021    COVID-19 Vaccine (3 - Booster for Moderna series) 08/03/2021    Influenza Vaccine (1) 09/01/2021        Past Medical History:  Past Medical History:   Diagnosis Date    Arthritis     Bronchitis     Cerebrovascular disease 3/15/2013    LUGO (dyspnea on exertion)     History of colon polyps     Adenomatous polyp of colon (D12.6)    History of nephrolithiasis     History of seasonal allergies     Hyperlipidemia LDL goal < 70     Hypertension     PVD (peripheral vascular disease)     Skin cancer     Sleep apnea     compliant with CPAP    TIA (transient ischemic attack)     Venous insufficiency      Past Surgical History:   Procedure Laterality Date    COLONOSCOPY N/A 9/5/2019    Procedure: COLONOSCOPY;  Surgeon: Stef Brady MD;  [facility] ENDO;  Service: Endoscopy;  Laterality: N/A;    CYST REMOVAL      HERNIA REPAIR      umbilical hernia repair    PHLEBOGRAPHY Bilateral 7/10/2018    Procedure: Venogram;  Surgeon: Tam Pineda MD;  Location: [facility] CATH;  Service: Cardiovascular;  Laterality: Bilateral;  bilateral iliac venography and possible iliac vein stenting via the internal jugular vein    PHLEBOGRAPHY Bilateral 1/30/2020    Procedure: Venogram, bilateral iliac;  Surgeon: Tam Pineda MD;  Location: [facility] CATH;  Service: Cardiology;  Laterality: Bilateral;    ME COLONOSCOPY,BIOPSY  4/17/2012    repeat colonoscopy 2015    rhino fyma N/A     [facility]      SPINE BIOPSY      Cyst Removed    TONSILLECTOMY      UVULOPALATOPHARYNGOPLASTY      VASCULAR SURGERY Bilateral 07/2018     Review of patient's allergies indicates:   Allergen Reactions    Gabapentin      Current Outpatient Medications on File Prior to Visit   Medication Sig Dispense Refill    aspirin  (ECOTRIN) 81 MG EC tablet Take 81 mg by mouth once daily.      atorvastatin (LIPITOR) 40 MG tablet Take 1 tablet (40 mg total) by mouth once daily. 90 tablet 3    irbesartan (AVAPRO) 300 MG tablet Take 1 tablet (300 mg total) by mouth every evening. 90 tablet 3    metoprolol tartrate (LOPRESSOR) 50 MG tablet Take 1 tablet (50 mg total) by mouth 2 (two) times daily. 180 tablet 3    multivitamin-minerals-lutein Tab Take 1 tablet by mouth once daily. Every day      albuterol (PROVENTIL/VENTOLIN HFA) 90 mcg/actuation inhaler Inhale 2 puffs into the lungs every 4 (four) hours as needed for Wheezing or Shortness of Breath. (Patient not taking: Reported on 3/16/2022) 18 g 11    torsemide (DEMADEX) 20 MG Tab Take 1 tablet (20 mg total) by mouth once daily. 90 tablet 3     Current Facility-Administered Medications on File Prior to Visit   Medication Dose Route Frequency Provider Last Rate Last Admin    acetaminophen tablet 650 mg  650 mg Oral Once PRN Jeremy Mcgovern MD        albuterol inhaler 2 puff  2 puff Inhalation Q20 Min PRN Jeremy Mcgovern MD        diphenhydrAMINE injection 25 mg  25 mg Intravenous Once PRN Jeremy Mcgovern MD        EPINEPHrine (EPIPEN) 0.3 mg/0.3 mL pen injection 0.3 mg  0.3 mg Intramuscular PRN Jeremy Mcgovern MD        methylPREDNISolone sodium succinate injection 40 mg  40 mg Intravenous Once PRN Jeremy Mcgovern MD        ondansetron disintegrating tablet 4 mg  4 mg Oral Once PRN Jeremy Mcgovern MD        sodium chloride 0.9% 500 mL flush bag   Intravenous PRN Jeremy Mcgovern MD        sodium chloride 0.9% flush 10 mL  10 mL Intravenous PRN Kostas Pascual MD   10 mL at 01/27/21 1455    sodium chloride 0.9% flush 10 mL  10 mL Intravenous PRN Jeremy Mcgovern MD         Social History     Socioeconomic History    Marital status:      Spouse name: Monika    Number of children: 2   Occupational History    Occupation: retired     Employer:  [employer]  "  Tobacco Use    Smoking status: Former Smoker     Packs/day: 0.50     Years: 10.00     Pack years: 5.00     Start date: 1970     Quit date: 1982     Years since quittin.8    Smokeless tobacco: Former User     Quit date: 1982    Tobacco comment: Hated it   Substance and Sexual Activity    Alcohol use: Not Currently     Alcohol/week: 0.0 standard drinks     Comment: 33 1/2 yrs abstinent    Drug use: Not Currently     Types: Cocaine, Marijuana     Comment: 33 1/2 yrs abstinent (CLEAN)    Sexual activity: Yes     Partners: Female     Birth control/protection: Post-menopausal     Family History   Problem Relation Age of Onset    Heart disease Mother         aorata valve    Cancer Mother         Colon    Cancer Father         bone and lung    Hypertension Father     Stroke Father     Cancer Maternal Uncle         Bone    Cancer Maternal Uncle         Throat    Stroke Maternal Uncle         passed on    Thyroid disease Sister     Cancer Maternal Uncle         throat    Cancer Sister         breast & bone marrow       Review of Systems   Musculoskeletal: Positive for arthralgias and back pain.   Neurological: Positive for weakness and numbness.       Objective:   /70   Pulse 64   Temp 98.2 °F (36.8 °C)   Ht 5' 8" (1.727 m)   Wt 133.4 kg (294 lb)   BMI 44.70 kg/m²     Physical Exam  Musculoskeletal:      Lumbar back: Spasms and tenderness present. No swelling, edema or deformity. Normal range of motion.   Neurological:      Mental Status: He is alert.      Sensory: No sensory deficit.      Motor: No tremor, atrophy or abnormal muscle tone.      Coordination: Coordination normal.      Gait: Gait normal.      Comments: The patient has a negative straight leg raise bilaterally.         Assessment:     1. Chronic pain of both knees    2. Lumbar pain    3. DDD (degenerative disc disease), lumbar    4. Dorsalgia, unspecified    5. Fatigue, unspecified type      Plan:   I am having " Marvin Chacon Jr. maintain his multivitamin-minerals-lutein, aspirin, albuterol, torsemide, atorvastatin, irbesartan, and metoprolol tartrate. We will continue to administer sodium chloride 0.9%, diphenhydrAMINE, methylPREDNISolone sodium succinate, EPINEPHrine, ondansetron, acetaminophen, albuterol, sodium chloride 0.9% 500 mL flush bag, and sodium chloride 0.9%.  Problem List Items Addressed This Visit    None     Visit Diagnoses     Chronic pain of both knees    -  Primary    Lumbar pain        DDD (degenerative disc disease), lumbar        Dorsalgia, unspecified        Relevant Orders    MRI Lumbar Spine Without Contrast    Fatigue, unspecified type        Relevant Orders    CBC Auto Differential    TSH    Comprehensive Metabolic Panel    Testosterone        Follow up for Draw labs now, Arrange imaging ordered today.    Marvin was seen today for annual exam.    Diagnoses and all orders for this visit:    Chronic pain of both knees    Lumbar pain    DDD (degenerative disc disease), lumbar    Dorsalgia, unspecified  -     MRI Lumbar Spine Without Contrast; Future    Fatigue, unspecified type  -     CBC Auto Differential; Future  -     TSH; Future  -     Comprehensive Metabolic Panel; Future  -     Testosterone; Future         The patient was instructed to stop the following meds:  There are no discontinued medications.  Orders Placed This Encounter   Procedures    MRI Lumbar Spine Without Contrast     He is 300 pound and has tolerated the machine in [place] at [facility] in the past.     Standing Status:   Future     Standing Expiration Date:   3/16/2023     Scheduling Instructions:      MRI procedures requiring sedation at [facility] are only available between 8am and 4pm.     Order Specific Question:   Does the patient have a pacemaker, defibrilator, cerebral aneurysm clip or other metal implant?     Answer:   No     Order Specific Question:   Is the patient claustrophobic?     Answer:   No     Order  Specific Question:   Does the patient require sedation?     Answer:   No    CBC Auto Differential     Standing Status:   Future     Standing Expiration Date:   5/15/2023    TSH     Standing Status:   Future     Standing Expiration Date:   3/16/2023    Comprehensive Metabolic Panel     Standing Status:   Future     Standing Expiration Date:   3/16/2023    Testosterone     Standing Status:   Future     Standing Expiration Date:   3/16/2023       Medication List with Changes/Refills   Current Medications    ALBUTEROL (PROVENTIL/VENTOLIN HFA) 90 MCG/ACTUATION INHALER    Inhale 2 puffs into the lungs every 4 (four) hours as needed for Wheezing or Shortness of Breath.    ASPIRIN (ECOTRIN) 81 MG EC TABLET    Take 81 mg by mouth once daily.    ATORVASTATIN (LIPITOR) 40 MG TABLET    Take 1 tablet (40 mg total) by mouth once daily.    IRBESARTAN (AVAPRO) 300 MG TABLET    Take 1 tablet (300 mg total) by mouth every evening.    METOPROLOL TARTRATE (LOPRESSOR) 50 MG TABLET    Take 1 tablet (50 mg total) by mouth 2 (two) times daily.    MULTIVITAMIN-MINERALS-LUTEIN TAB    Take 1 tablet by mouth once daily. Every day    TORSEMIDE (DEMADEX) 20 MG TAB    Take 1 tablet (20 mg total) by mouth once daily.      Medication List with Changes/Refills   Current Medications    ALBUTEROL (PROVENTIL/VENTOLIN HFA) 90 MCG/ACTUATION INHALER    Inhale 2 puffs into the lungs every 4 (four) hours as needed for Wheezing or Shortness of Breath.       Start Date: 5/25/2021 End Date: --    ASPIRIN (ECOTRIN) 81 MG EC TABLET    Take 81 mg by mouth once daily.       Start Date: --        End Date: --    ATORVASTATIN (LIPITOR) 40 MG TABLET    Take 1 tablet (40 mg total) by mouth once daily.       Start Date: 10/4/2021 End Date: --    IRBESARTAN (AVAPRO) 300 MG TABLET    Take 1 tablet (300 mg total) by mouth every evening.       Start Date: 10/4/2021 End Date: 10/4/2022    METOPROLOL TARTRATE (LOPRESSOR) 50 MG TABLET    Take 1 tablet (50 mg total) by  mouth 2 (two) times daily.       Start Date: 10/4/2021 End Date: 10/4/2022    MULTIVITAMIN-MINERALS-LUTEIN TAB    Take 1 tablet by mouth once daily. Every day       Start Date: 8/26/2011 End Date: --    TORSEMIDE (DEMADEX) 20 MG TAB    Take 1 tablet (20 mg total) by mouth once daily.       Start Date: 10/4/2021 End Date: 1/2/2022        Consider neurology or neursurgical evaluation based on the report.

## 2022-03-17 ENCOUNTER — PATIENT MESSAGE (OUTPATIENT)
Dept: ADMINISTRATIVE | Facility: OTHER | Age: 72
End: 2022-03-17
Payer: MEDICARE

## 2022-03-19 NOTE — PROGRESS NOTES
I have reviewed the testosterone and it is abnormal.  I recommend that we use 200 mg of depo testosterone by injection every 1 month(s) and recheck a testosterone level, PSA and CBC in 3 months.      Your CBC shows no significant infection or anemia.     The electrolytes are all stable.    The TSH level,  which is a measure of the thyroid function, is normal.   No further workup on the thyroid is needed.

## 2022-03-20 ENCOUNTER — PATIENT MESSAGE (OUTPATIENT)
Dept: FAMILY MEDICINE | Facility: CLINIC | Age: 72
End: 2022-03-20
Payer: MEDICARE

## 2022-04-05 ENCOUNTER — HOSPITAL ENCOUNTER (OUTPATIENT)
Dept: RADIOLOGY | Facility: HOSPITAL | Age: 72
Discharge: HOME | End: 2022-04-05
Attending: FAMILY MEDICINE
Payer: MEDICARE

## 2022-04-05 ENCOUNTER — PATIENT MESSAGE (OUTPATIENT)
Dept: FAMILY MEDICINE | Facility: CLINIC | Age: 72
End: 2022-04-05
Payer: MEDICARE

## 2022-04-05 DIAGNOSIS — M54.9 DORSALGIA, UNSPECIFIED: ICD-10-CM

## 2022-04-12 ENCOUNTER — CLINICAL SUPPORT (OUTPATIENT)
Dept: PULMONOLOGY | Facility: CLINIC | Age: 72
End: 2022-04-12
Payer: MEDICARE

## 2022-04-12 ENCOUNTER — OFFICE VISIT (OUTPATIENT)
Dept: PULMONOLOGY | Facility: CLINIC | Age: 72
End: 2022-04-12
Payer: MEDICARE

## 2022-04-12 VITALS
HEIGHT: 68 IN | OXYGEN SATURATION: 95 % | WEIGHT: 313.06 LBS | HEIGHT: 68 IN | WEIGHT: 313.06 LBS | BODY MASS INDEX: 47.45 KG/M2 | DIASTOLIC BLOOD PRESSURE: 82 MMHG | SYSTOLIC BLOOD PRESSURE: 126 MMHG | BODY MASS INDEX: 47.45 KG/M2 | RESPIRATION RATE: 12 BRPM | HEART RATE: 68 BPM

## 2022-04-12 DIAGNOSIS — I50.1 CARDIAC ASTHMA: ICD-10-CM

## 2022-04-12 DIAGNOSIS — R09.02 EXERCISE HYPOXEMIA: Primary | ICD-10-CM

## 2022-04-12 DIAGNOSIS — R06.09 DYSPNEA ON EXERTION: ICD-10-CM

## 2022-04-12 DIAGNOSIS — Z76.0 MEDICATION REFILL: ICD-10-CM

## 2022-04-12 DIAGNOSIS — J45.20 MILD INTERMITTENT ASTHMA, UNSPECIFIED WHETHER COMPLICATED: ICD-10-CM

## 2022-04-12 PROCEDURE — 99999 PR PBB SHADOW E&M-EST. PATIENT-LVL I: CPT | Mod: PBBFAC,,,

## 2022-04-12 PROCEDURE — 94618 PULMONARY STRESS TESTING: ICD-10-PCS | Mod: 26,S$PBB,, | Performed by: INTERNAL MEDICINE

## 2022-04-12 PROCEDURE — 99215 PR OFFICE/OUTPT VISIT, EST, LEVL V, 40-54 MIN: ICD-10-PCS | Mod: 25,S$PBB,, | Performed by: INTERNAL MEDICINE

## 2022-04-12 PROCEDURE — 99214 OFFICE O/P EST MOD 30 MIN: CPT | Mod: PBBFAC,27 | Performed by: INTERNAL MEDICINE

## 2022-04-12 PROCEDURE — 94618 PULMONARY STRESS TESTING: CPT | Mod: 26,S$PBB,, | Performed by: INTERNAL MEDICINE

## 2022-04-12 PROCEDURE — 99999 PR PBB SHADOW E&M-EST. PATIENT-LVL IV: ICD-10-PCS | Mod: PBBFAC,,, | Performed by: INTERNAL MEDICINE

## 2022-04-12 PROCEDURE — 99999 PR PBB SHADOW E&M-EST. PATIENT-LVL I: ICD-10-PCS | Mod: PBBFAC,,,

## 2022-04-12 PROCEDURE — 99211 OFF/OP EST MAY X REQ PHY/QHP: CPT | Mod: PBBFAC

## 2022-04-12 PROCEDURE — 94618 PULMONARY STRESS TESTING: CPT | Mod: PBBFAC

## 2022-04-12 PROCEDURE — 99215 OFFICE O/P EST HI 40 MIN: CPT | Mod: 25,S$PBB,, | Performed by: INTERNAL MEDICINE

## 2022-04-12 PROCEDURE — 99999 PR PBB SHADOW E&M-EST. PATIENT-LVL IV: CPT | Mod: PBBFAC,,, | Performed by: INTERNAL MEDICINE

## 2022-04-12 RX ORDER — TORSEMIDE 20 MG/1
20 TABLET ORAL DAILY
Qty: 90 TABLET | Refills: 3 | Status: SHIPPED | OUTPATIENT
Start: 2022-04-12 | End: 2022-10-21 | Stop reason: SDUPTHER

## 2022-04-12 RX ORDER — ALBUTEROL SULFATE 90 UG/1
2 AEROSOL, METERED RESPIRATORY (INHALATION) EVERY 4 HOURS PRN
Qty: 54 G | Refills: 3 | Status: SHIPPED | OUTPATIENT
Start: 2022-04-12 | End: 2022-04-15

## 2022-04-12 NOTE — LETTER
April 12, 2022      Stef Brady MD  [address]  [place] [address]           [facility]  [address] [place] [address]  Phone: [phone]  Fax: [phone]          Patient: Marvin Chacon Jr.   MR Number: [id number]   YOB: 1950   Date of Visit: 4/12/2022           Thank you for referring Marvin Chacon to me for evaluation. Attached you will find relevant portions of my assessment and plan of care.    If you have questions, please do not hesitate to call me. I look forward to following Marvin Chacon along with you.    Sincerely,    Kostas Pascual MD    Enclosure  CC:  No Recipients    If you would like to receive this communication electronically, please contact [email or web address] or [phone] to request [employer] access.    [employer] is a tool which provides read-only access to select patient information with whom you have a relationship. It's easy to use and provides real time access to review your patients record including encounter summaries, notes, results, and demographic information.    If you feel you have received this communication in error or would no longer like to receive these types of communications, please e-mail [email or web address]

## 2022-04-12 NOTE — PROCEDURES
"[facility] - Pulmonary Function  Six Minute Walk   SUMMARY   Ordering Provider: Kostas Pascual MD   Interpreting Provider: Kostas Pascual MD  Performing nurse/tech/RT: V. T., RT  Diagnosis:  (Dyspnea on exertion; Cardiac asthma)  Height: 5' 8" (172.7 cm)  Weight: (!) 142 kg (313 lb 0.9 oz)  BMI (Calculated): 47.6   Patient Race:    Phase Oxygen Assessment Supplemental O2 Heart   Rate Blood Pressure Brandi Dyspnea Scale Rating   Resting 95 % Room Air 68 bpm 126/82 3   Exercise        Minute        1 93 % Room Air 115 bpm     2 88 % Room Air 123 bpm     3 95 % 2 L/M 120 bpm     4 96 % 2 L/M 87 bpm     5 96 % 2 L/M 100 bpm     6  96 % 2 L/M 107 bpm 142/74 5-6   Recovery        Minute        1 95 % 2 L/M 86 bpm     2 96 % 2 L/M 88 bpm     3 96 % 2 L/M 92 bpm     4 96 % 2 L/M 91 bpm 136/72 5-6     Six Minute Walk Summary  6MWT Status: completed with stops  Patient Reported: Dyspnea, Lightheadedness, Other (Comment) (Knee pain, lower back pain, chest tightness)     Interpretation:  Did the patient stop or pause?: Yes  How many times did the patient stop or pause?: 2  Stop Time 1: 170  Restart Time 1: 185  Pause Time 1: 15 seconds  Stop Time 2: 220  Restart Time 2: 284  Pause Time 2: 64 seconds      Total Time Walked (Calculated): 281 seconds  Final Partial Lap Distance (feet): 50 feet  Total Distance Meters (Calculated): 259.08 meters  Predicted Distance Meters (Calculated): 402.04 meters  Percentage of Predicted (Calculated): 64.44  Peak VO2 (Calculated): 11.75  Mets: 3.36  Has The Patient Had a Previous Six Minute Walk Test?: Yes       Previous 6MWT Results  Has The Patient Had a Previous Six Minute Walk Test?: Yes  Date of Previous Test: 06/17/21  Total Time Walked: 259 seconds  Total Distance (meters): 239.22  Predicted Distance (meters): 411.72 meters  Percentage of Predicted: 57.37  Percent Change (Calculated): -0.08    Interpretation:  Total distance walked in six minutes is moderately reduced indicating an " moderate reduction in functional capacity.  There was significant severe oxygen desaturation to 88 % with exercise on room air (oxygen saturation less than 89%). Clinical correlation suggested.    Patient met criteria for oxygen prescription.    [] Mild exercise-induced hypoxemia described as an arterial oxygen saturation of 93-95% (or 3-4% less than at rest),  [] Moderate exercise-induced hypoxemia as 89-93%  [x] Severe exercise induced hypoxemia as < 89% O2 saturation.  Medicare Criteria for oxygen prescription comments:   When arterial oxygen saturation is at or below 88% during exercise on room air (severe exercise induced hypoxemia) then the patient falls under Medicare Group 1 criteria for supplemental oxygen prescription.  Details about Medicare Group Criteria coverage can be found at http://www.cms.hhs.gov/manuals/downloads/       Kostas Pascual MD

## 2022-04-12 NOTE — PROGRESS NOTES
Subjective:     Patient ID: Marvin Chacon Jr. is a 71 y.o. male.    Chief Complaint:  Weight gain and swelling in right leg    HPI 70 y/o with right sided heart failure.On BiPAP , not using oxygen with BiPAP  Obtains BiPAP from [facility]. Obtains oxygen from [employer]  He is usuing BiPAP regularly with nasal mask    Since last seen has gained almost 20 lbs due to fluid retention   Numbness in legs when sitting - recently had MRI attempted - scheduled for 3 weeks    Severe Obstructive Sleep Apnea and Pulmonary Hypertension:  Patient also reports today for review of recent polysomnogram with BiPAP titration.  He has a history of marked pulmonary hypertension based on echocardiogram and his sleep study confirmed significant nocturnal hypoxemia.  The patient is using a nasal mask and during the sleep study was noted to have significant air leak through his mouth.  Patient reports that he feels that this can be controlled with the use of his false teeth at night and he does not want to use a chinstrap at this time.  Also refused to use full face mask.  Using BiPAP regulalry  Getting equipment from [employer]  The most recent BiPAP titration study demonstrated a significant increase in airway pressures that were needed to decrease his apnea-hypopnea index (22/17).  I feel that he is unlikely that he will be of the tolerate the elevated BiPAP pressure that is recommended.  For that reason well place patient on BiPAP of 20/16 He had a follow-up  an overnight oxygen saturation study while on BiPAP - noted to need oxygen with BiPAP      [facility] for durable medical equipment  Changed ramp time - decreased to 5 min    Past Medical History:   Diagnosis Date    Arthritis     Bronchitis     Cerebrovascular disease 3/15/2013    LUGO (dyspnea on exertion)     History of colon polyps     Adenomatous polyp of colon (D12.6)    History of nephrolithiasis     History of seasonal allergies     Hyperlipidemia LDL goal < 70      Hypertension     PVD (peripheral vascular disease)     Skin cancer     Sleep apnea     compliant with CPAP    TIA (transient ischemic attack)     Venous insufficiency      Past Surgical History:   Procedure Laterality Date    COLONOSCOPY N/A 9/5/2019    Procedure: COLONOSCOPY;  Surgeon: Stef Brady MD;  Location: [facility] ENDO;  Service: Endoscopy;  Laterality: N/A;    CYST REMOVAL      HERNIA REPAIR      umbilical hernia repair    PHLEBOGRAPHY Bilateral 7/10/2018    Procedure: Venogram;  Surgeon: Tam Pineda MD;  Location: [facility] CATH;  Service: Cardiovascular;  Laterality: Bilateral;  bilateral iliac venography and possible iliac vein stenting via the internal jugular vein    PHLEBOGRAPHY Bilateral 1/30/2020    Procedure: Venogram, bilateral iliac;  Surgeon: Tam Pineda MD;  Location: [facility] CATH;  Service: Cardiology;  Laterality: Bilateral;    [place] COLONOSCOPY,BIOPSY  4/17/2012    repeat colonoscopy 2015    rhino fyma N/A     [facility]      SPINE BIOPSY      Cyst Removed    TONSILLECTOMY      UVULOPALATOPHARYNGOPLASTY      VASCULAR SURGERY Bilateral 07/2018     Review of patient's allergies indicates:   Allergen Reactions    Gabapentin      Current Outpatient Medications on File Prior to Visit   Medication Sig Dispense Refill    aspirin (ECOTRIN) 81 MG EC tablet Take 81 mg by mouth once daily.      atorvastatin (LIPITOR) 40 MG tablet Take 1 tablet (40 mg total) by mouth once daily. 90 tablet 3    irbesartan (AVAPRO) 300 MG tablet Take 1 tablet (300 mg total) by mouth every evening. 90 tablet 3    metoprolol tartrate (LOPRESSOR) 50 MG tablet Take 1 tablet (50 mg total) by mouth 2 (two) times daily. 180 tablet 3    multivitamin-minerals-lutein Tab Take 1 tablet by mouth once daily. Every day      [DISCONTINUED] albuterol (PROVENTIL/VENTOLIN HFA) 90 mcg/actuation inhaler Inhale 2 puffs into the lungs every 4 (four) hours as needed for Wheezing or Shortness of Breath. 18 g 11     [DISCONTINUED] torsemide (DEMADEX) 20 MG Tab Take 1 tablet (20 mg total) by mouth once daily. (Patient not taking: Reported on 2022) 90 tablet 3     Current Facility-Administered Medications on File Prior to Visit   Medication Dose Route Frequency Provider Last Rate Last Admin    acetaminophen tablet 650 mg  650 mg Oral Once PRN Jeremy Mcgovern MD        diphenhydrAMINE injection 25 mg  25 mg Intravenous Once PRN Jeremy Mcgovern MD        EPINEPHrine (EPIPEN) 0.3 mg/0.3 mL pen injection 0.3 mg  0.3 mg Intramuscular PRN Jeremy Mcgovern MD        methylPREDNISolone sodium succinate injection 40 mg  40 mg Intravenous Once PRN Jeremy Mcgovern MD        ondansetron disintegrating tablet 4 mg  4 mg Oral Once PRN Jeremy Mcgovern MD        sodium chloride 0.9% 500 mL flush bag   Intravenous PRN Jeremy Mcgovern MD        sodium chloride 0.9% flush 10 mL  10 mL Intravenous PRN Kostas Pascual MD   10 mL at 21 1455    sodium chloride 0.9% flush 10 mL  10 mL Intravenous PRN Jeremy Mcgovern MD        [DISCONTINUED] albuterol inhaler 2 puff  2 puff Inhalation Q20 Min PRN Jeremy Mcgovern MD         Social History     Socioeconomic History    Marital status:      Spouse name: Monika    Number of children: 2   Occupational History    Occupation: retired     Employer:  [employer]   Tobacco Use    Smoking status: Former Smoker     Packs/day: 0.50     Years: 10.00     Pack years: 5.00     Start date: 1970     Quit date: 1982     Years since quittin.9    Smokeless tobacco: Former User     Quit date: 1982    Tobacco comment: Hated it   Substance and Sexual Activity    Alcohol use: Not Currently     Alcohol/week: 0.0 standard drinks     Comment: 33 1/2 yrs abstinent    Drug use: Not Currently     Types: Cocaine, Marijuana     Comment: 33 1/2 yrs abstinent (CLEAN)    Sexual activity: Yes     Partners: Female     Birth control/protection: Post-menopausal  "    Family History   Problem Relation Age of Onset    Heart disease Mother         aorata valve    Cancer Mother         Colon    Cancer Father         bone and lung    Hypertension Father     Stroke Father     Cancer Maternal Uncle         Bone    Cancer Maternal Uncle         Throat    Stroke Maternal Uncle         passed on    Thyroid disease Sister     Cancer Maternal Uncle         throat    Cancer Sister         breast & bone marrow       Review of Systems   Constitutional: Positive for fatigue and weakness.   Respiratory: Positive for apnea, shortness of breath, dyspnea on extertion and Paroxysmal Nocturnal Dyspnea.    Cardiovascular: Positive for leg swelling.   Musculoskeletal: Positive for arthralgias.   Psychiatric/Behavioral: Positive for sleep disturbance.       Objective:      /82   Pulse 68   Resp 12   Ht 5' 8" (1.727 m)   Wt (!) 142 kg (313 lb 0.9 oz)   SpO2 95%   BMI 47.60 kg/m²   Physical Exam  Vitals and nursing note reviewed.   Constitutional:       Appearance: He is well-developed. He is obese. He is ill-appearing.   HENT:      Head: Normocephalic and atraumatic.      Nose: Nose normal.   Eyes:      Conjunctiva/sclera: Conjunctivae normal.      Pupils: Pupils are equal, round, and reactive to light.   Neck:      Thyroid: No thyromegaly.      Vascular: JVD present.      Trachea: No tracheal deviation.   Cardiovascular:      Rate and Rhythm: Normal rate. Rhythm regularly irregular.      Chest Wall: PMI is displaced.      Heart sounds: Murmur heard.    Systolic murmur is present with a grade of 2/6.  Pulmonary:      Effort: Pulmonary effort is normal.      Breath sounds: Examination of the right-lower field reveals rales. Examination of the left-lower field reveals rales. Decreased breath sounds and rales present.   Abdominal:      Palpations: Abdomen is soft.   Musculoskeletal:         General: Normal range of motion.      Cervical back: Neck supple.   Lymphadenopathy:      " "Cervical: No cervical adenopathy.   Skin:     General: Skin is warm and dry.   Neurological:      Mental Status: He is alert and oriented to person, place, and time.       Personal Diagnostic Review  6 min walk      [facility] - Pulmonary Function  Six Minute Walk   SUMMARY   Ordering Provider: Kostas Pascual MD        Interpreting Provider: Kostas Pascual MD  Performing nurse/tech/RT: V. T., RT  Diagnosis:  (Dyspnea on exertion; Cardiac asthma)  Height: 5' 8" (172.7 cm)  Weight: (!) 142 kg (313 lb 0.9 oz)  BMI (Calculated): 47.6              Patient Race:    Phase Oxygen Assessment Supplemental O2 Heart   Rate Blood Pressure Brandi Dyspnea Scale Rating   Resting 95 % Room Air 68 bpm 126/82 3   Exercise             Minute             1 93 % Room Air 115 bpm       2 88 % Room Air 123 bpm       3 95 % 2 L/M 120 bpm       4 96 % 2 L/M 87 bpm       5 96 % 2 L/M 100 bpm       6  96 % 2 L/M 107 bpm 142/74 5-6   Recovery             Minute             1 95 % 2 L/M 86 bpm       2 96 % 2 L/M 88 bpm       3 96 % 2 L/M 92 bpm       4 96 % 2 L/M 91 bpm 136/72 5-6      Six Minute Walk Summary  6MWT Status: completed with stops  Patient Reported: Dyspnea, Lightheadedness, Other (Comment) (Knee pain, lower back pain, chest tightness)      Interpretation:  Did the patient stop or pause?: Yes  How many times did the patient stop or pause?: 2  Stop Time 1: 170  Restart Time 1: 185  Pause Time 1: 15 seconds  Stop Time 2: 220  Restart Time 2: 284  Pause Time 2: 64 seconds  Total Time Walked (Calculated): 281 seconds  Final Partial Lap Distance (feet): 50 feet  Total Distance Meters (Calculated): 259.08 meters  Predicted Distance Meters (Calculated): 402.04 meters  Percentage of Predicted (Calculated): 64.44  Peak VO2 (Calculated): 11.75  Mets: 3.36  Has The Patient Had a Previous Six Minute Walk Test?: Yes     Previous 6MWT Results  Has The Patient Had a Previous Six Minute Walk Test?: Yes  Date of Previous Test: 06/17/21  Total " Time Walked: 259 seconds  Total Distance (meters): 239.22  Predicted Distance (meters): 411.72 meters  Percentage of Predicted: 57.37  Percent Change (Calculated): -0.08          Pulmonary Studies Review 4/12/2022   SpO2 95   Ordering Provider -   Interpreting Provider -   Performing nurse/tech/RT -   Diagnosis -   Height 68   Weight 5008.85   BMI (Calculated) 47.6   Predicted Distance 241.1   Patient Race -   6MWT Status -   Patient Reported -   Was O2 used? -   Delivery Method -   6MW Distance walked (feet) -   Distance walked (meters) -   Did patient stop? -   How many times? -   Stop Time 1 -   Restart Time 1 -   Stop Time 2 -   Restart Time 2 -   Did patient restart? -   Type of assistive device(s) used? -   Is extra documentation required for this patient? -   Oxygen Saturation -   Supplemental Oxygen -   Heart Rate -   Blood Pressure -   Brandi Dyspnea Rating  -   Oxygen Saturation -   Supplemental Oxygen -   Heart Rate -   Blood Pressure -   Brandi Dyspnea Rating  -   Recovery Time (seconds) -   Oxygen Saturation -   Supplemental Oxygen -   Heart Rate -   Blood Pressure -   Brandi Dyspnea Rating  -   Is procedure ready for interpretation? -   Did the patient stop or pause? -   How many times did the patient stop or pause? -   Stop Time 1 -   Restart Time 1 -   Pause Time 1 -   Stop Time 2 -   Restart Time 2 -   Pause Time 2 -   Total Time Walked (Calculated) -   Total Laps Walked -   Final Partial Lap Distance (feet) -   Total Distance Feet (Calculated) -   Total Distance Meters (Calculated) -   Predicted Distance Meters (Calculated) 402.04   Percentage of Predicted (Calculated) -   Peak VO2 (Calculated) -   Mets -   Has The Patient Had a Previous Six Minute Walk Test? -   Oxygen Qualification? -   Oxygen Saturation -   Supplemental Oxygen -   Heart Rate -   Blood Pressure -   Brandi Dyspnea Rating  -   Oxygen Saturation -   Supplemental Oxygen -   Heart Rate -   Blood Pressure -   Brandi Dyspnea Rating  -   Recovery  Time (seconds) -   Oxygen Saturation -   Supplemental Oxygen -   Heart Rate -   Blood Pressure -   Brandi Dyspnea Rating  -       X-Ray Chest PA And Lateral  Narrative: EXAMINATION:  XR CHEST PA AND LATERAL    CLINICAL HISTORY:  Shortness of breath    COMPARISON:  Studies dating back to July 9, 2018    FINDINGS:  There is blunting of the right costophrenic angle.  The lungs are free of new pulmonary opacities.  The cardiac silhouette size is borderline enlarged.  The trachea is midline and the mediastinal width is normal. Negative for left effusion or pneumothorax.  Pulmonary vasculature is chronically mildly congested with prominence of the central hilar regions again seen.  Negative for osseous abnormalities. Tortuous aorta with calcifications of the aortic knob.  There are degenerative changes of the spine and both shoulder girdles.  Impression: 1.  There is blunting of the right costophrenic angle concerning for small effusion.  Similar degree of mild vascular congestion.  Cardiac silhouette size remains borderline enlarged.  Mild interstitial pulmonary edema or interstitial infectious process could have this appearance.  Clinical correlation is advised.    2.  Negative for acute process otherwise.    3.  Stable findings as noted above.    Electronically signed by: Alvaro Welch MD  Date:    03/16/2021  Time:    16:07      Office Spirometry Results:     No flowsheet data found.  Pulmonary Studies Review 4/12/2022   SpO2 95   Ordering Provider -   Interpreting Provider -   Performing nurse/tech/RT -   Diagnosis -   Height 68   Weight 5008.85   BMI (Calculated) 47.6   Predicted Distance 241.1   Patient Race -   6MWT Status -   Patient Reported -   Was O2 used? -   Delivery Method -   6MW Distance walked (feet) -   Distance walked (meters) -   Did patient stop? -   How many times? -   Stop Time 1 -   Restart Time 1 -   Stop Time 2 -   Restart Time 2 -   Did patient restart? -   Type of assistive device(s) used? -    Is extra documentation required for this patient? -   Oxygen Saturation -   Supplemental Oxygen -   Heart Rate -   Blood Pressure -   Brandi Dyspnea Rating  -   Oxygen Saturation -   Supplemental Oxygen -   Heart Rate -   Blood Pressure -   Brandi Dyspnea Rating  -   Recovery Time (seconds) -   Oxygen Saturation -   Supplemental Oxygen -   Heart Rate -   Blood Pressure -   Brandi Dyspnea Rating  -   Is procedure ready for interpretation? -   Did the patient stop or pause? -   How many times did the patient stop or pause? -   Stop Time 1 -   Restart Time 1 -   Pause Time 1 -   Stop Time 2 -   Restart Time 2 -   Pause Time 2 -   Total Time Walked (Calculated) -   Total Laps Walked -   Final Partial Lap Distance (feet) -   Total Distance Feet (Calculated) -   Total Distance Meters (Calculated) -   Predicted Distance Meters (Calculated) 402.04   Percentage of Predicted (Calculated) -   Peak VO2 (Calculated) -   Mets -   Has The Patient Had a Previous Six Minute Walk Test? -   Oxygen Qualification? -   Oxygen Saturation -   Supplemental Oxygen -   Heart Rate -   Blood Pressure -   Brandi Dyspnea Rating  -   Oxygen Saturation -   Supplemental Oxygen -   Heart Rate -   Blood Pressure -   Brandi Dyspnea Rating  -   Recovery Time (seconds) -   Oxygen Saturation -   Supplemental Oxygen -   Heart Rate -   Blood Pressure -   Brandi Dyspnea Rating  -         Assessment:            Exercise hypoxemia  -     OXYGEN FOR HOME USE    Medication refill  -     torsemide (DEMADEX) 20 MG Tab; Take 1 tablet (20 mg total) by mouth once daily.  Dispense: 90 tablet; Refill: 3    Dyspnea on exertion  -     albuterol (PROVENTIL/VENTOLIN HFA) 90 mcg/actuation inhaler; Inhale 2 puffs into the lungs every 4 (four) hours as needed for Wheezing or Shortness of Breath (at bedtime).  Dispense: 54 g; Refill: 3    Mild intermittent asthma, unspecified whether complicated  -     X-Ray Chest PA And Lateral; Future; Expected date: 04/12/2022  -     Cancel: Complete  PFT with bronchodilator; Future; Expected date: 04/12/2022  -     albuterol (PROVENTIL/VENTOLIN HFA) 90 mcg/actuation inhaler; Inhale 2 puffs into the lungs every 4 (four) hours as needed for Wheezing or Shortness of Breath (at bedtime).  Dispense: 54 g; Refill: 3  -     Complete PFT with bronchodilator; Future; Expected date: 04/12/2022    Cardiac asthma  -     Complete PFT with bronchodilator; Future; Expected date: 04/12/2022          Outpatient Encounter Medications as of 4/12/2022   Medication Sig Dispense Refill    aspirin (ECOTRIN) 81 MG EC tablet Take 81 mg by mouth once daily.      atorvastatin (LIPITOR) 40 MG tablet Take 1 tablet (40 mg total) by mouth once daily. 90 tablet 3    irbesartan (AVAPRO) 300 MG tablet Take 1 tablet (300 mg total) by mouth every evening. 90 tablet 3    metoprolol tartrate (LOPRESSOR) 50 MG tablet Take 1 tablet (50 mg total) by mouth 2 (two) times daily. 180 tablet 3    multivitamin-minerals-lutein Tab Take 1 tablet by mouth once daily. Every day      [DISCONTINUED] albuterol (PROVENTIL/VENTOLIN HFA) 90 mcg/actuation inhaler Inhale 2 puffs into the lungs every 4 (four) hours as needed for Wheezing or Shortness of Breath. 18 g 11    albuterol (PROVENTIL/VENTOLIN HFA) 90 mcg/actuation inhaler Inhale 2 puffs into the lungs every 4 (four) hours as needed for Wheezing or Shortness of Breath (at bedtime). 54 g 3    torsemide (DEMADEX) 20 MG Tab Take 1 tablet (20 mg total) by mouth once daily. 90 tablet 3    [DISCONTINUED] torsemide (DEMADEX) 20 MG Tab Take 1 tablet (20 mg total) by mouth once daily. (Patient not taking: Reported on 4/12/2022) 90 tablet 3     Facility-Administered Encounter Medications as of 4/12/2022   Medication Dose Route Frequency Provider Last Rate Last Admin    acetaminophen tablet 650 mg  650 mg Oral Once PRN Jeremy Mcgovern MD        diphenhydrAMINE injection 25 mg  25 mg Intravenous Once PRN Jeremy Mcgovern MD        EPINEPHrine (EPIPEN) 0.3  mg/0.3 mL pen injection 0.3 mg  0.3 mg Intramuscular PRN Jeremy Mcgovern MD        methylPREDNISolone sodium succinate injection 40 mg  40 mg Intravenous Once PRN Jeremy Mcgovern MD        ondansetron disintegrating tablet 4 mg  4 mg Oral Once PRN Jeremy Mcgovern MD        sodium chloride 0.9% 500 mL flush bag   Intravenous PRN Jeremy Mcgovern MD        sodium chloride 0.9% flush 10 mL  10 mL Intravenous PRN Kostas Pascual MD   10 mL at 01/27/21 1455    sodium chloride 0.9% flush 10 mL  10 mL Intravenous PRN Jeremy Mcgovern MD        [DISCONTINUED] albuterol inhaler 2 puff  2 puff Inhalation Q20 Min PRN Jeremy Mcgovern MD         Plan:       Requested Prescriptions     Signed Prescriptions Disp Refills    torsemide (DEMADEX) 20 MG Tab 90 tablet 3     Sig: Take 1 tablet (20 mg total) by mouth once daily.    albuterol (PROVENTIL/VENTOLIN HFA) 90 mcg/actuation inhaler 54 g 3     Sig: Inhale 2 puffs into the lungs every 4 (four) hours as needed for Wheezing or Shortness of Breath (at bedtime).     Problem List Items Addressed This Visit    None     Visit Diagnoses     Exercise hypoxemia    -  Primary    Relevant Orders    OXYGEN FOR HOME USE    Medication refill        Relevant Medications    torsemide (DEMADEX) 20 MG Tab    Dyspnea on exertion        Relevant Medications    albuterol (PROVENTIL/VENTOLIN HFA) 90 mcg/actuation inhaler    Mild intermittent asthma, unspecified whether complicated        Relevant Medications    albuterol (PROVENTIL/VENTOLIN HFA) 90 mcg/actuation inhaler    Other Relevant Orders    X-Ray Chest PA And Lateral    Complete PFT with bronchodilator    Cardiac asthma        Relevant Orders    Complete PFT with bronchodilator             No follow-ups on file.    MEDICAL DECISION MAKING: Moderate to high complexity.  Overall, the multiple problems listed are of moderate to high severity that may impact quality of life and activities of daily living. Side effects of medications,  treatment plan as well as options and alternatives reviewed and discussed with patient. There was counseling of patient concerning these issues.    Total time spent in counseling and coordination of care - 45  minutes of total time spent on the encounter, which includes face to face time and non-face to face time preparing to see the patient (eg, review of tests), Obtaining and/or reviewing separately obtained history, Documenting clinical information in the electronic or other health record, Independently interpreting results (not separately reported) and communicating results to the patient/family/caregiver, or Care coordination (not separately reported).    Time was used in discussion of prognosis, risks, benefits of treatment, instructions and compliance with regimen . Discussion with other physicians and/or health care providers - home health or for use of durable medical equipment (oxygen, nebulizers, CPAP, BiPAP) occurred.

## 2022-04-13 ENCOUNTER — PATIENT MESSAGE (OUTPATIENT)
Dept: FAMILY MEDICINE | Facility: CLINIC | Age: 72
End: 2022-04-13
Payer: MEDICARE

## 2022-04-25 ENCOUNTER — PATIENT MESSAGE (OUTPATIENT)
Dept: FAMILY MEDICINE | Facility: CLINIC | Age: 72
End: 2022-04-25
Payer: MEDICARE

## 2022-04-25 ENCOUNTER — HOSPITAL ENCOUNTER (OUTPATIENT)
Dept: RADIOLOGY | Facility: HOSPITAL | Age: 72
Discharge: HOME | End: 2022-04-25
Attending: FAMILY MEDICINE
Payer: MEDICARE

## 2022-04-25 PROCEDURE — 72148 MRI LUMBAR SPINE WITHOUT CONTRAST: ICD-10-PCS | Mod: 26,,, | Performed by: RADIOLOGY

## 2022-04-25 PROCEDURE — 72148 MRI LUMBAR SPINE W/O DYE: CPT | Mod: 26,,, | Performed by: RADIOLOGY

## 2022-04-25 PROCEDURE — 72148 MRI LUMBAR SPINE W/O DYE: CPT | Mod: TC

## 2022-04-26 ENCOUNTER — PATIENT MESSAGE (OUTPATIENT)
Dept: FAMILY MEDICINE | Facility: CLINIC | Age: 72
End: 2022-04-26
Payer: MEDICARE

## 2022-04-26 DIAGNOSIS — M51.36 DDD (DEGENERATIVE DISC DISEASE), LUMBAR: Primary | ICD-10-CM

## 2022-04-26 NOTE — PROGRESS NOTES
You have disc bulging along with arthritis of the spine that is causing some decreased space where the nerv roots exit.  I recommend that you be seen in the neurosurgery department by one of the providers.  If that is ok with you, I will place a referral.

## 2022-04-26 NOTE — TELEPHONE ENCOUNTER
I have signed for the following orders AND/OR meds.  Please call the patient and ask the patient to schedule the testing AND/OR inform about any medications that were sent.      Orders Placed This Encounter   Procedures    Ambulatory referral/consult to Neurosurgery     Standing Status:   Future     Standing Expiration Date:   5/26/2023     Referral Priority:   Routine     Referral Type:   Consultation     Referral Reason:   Specialty Services Required     Referred to Provider:   Eugene King MD     Requested Specialty:   Neurosurgery     Number of Visits Requested:   1        make sure that they call  [phone]  To book the appt.

## 2022-04-27 ENCOUNTER — PATIENT OUTREACH (OUTPATIENT)
Dept: ADMINISTRATIVE | Facility: OTHER | Age: 72
End: 2022-04-27
Payer: MEDICARE

## 2022-04-27 ENCOUNTER — TELEPHONE (OUTPATIENT)
Dept: NEUROSURGERY | Facility: CLINIC | Age: 72
End: 2022-04-27
Payer: MEDICARE

## 2022-04-27 NOTE — PROGRESS NOTES
Health Maintenance Due   Topic Date Due    Shingles Vaccine (1 of 2) Never done    TETANUS VACCINE  01/21/2021    Influenza Vaccine (1) 09/01/2021     Updates were requested from care everywhere.  Chart was reviewed for overdue Proactive [facility] Encounters ([facility]) topics (CRS, Breast Cancer Screening, Eye exam)  Health Maintenance has been updated.  [facility] immunization registry triggered.  Immunizations were reconciled.

## 2022-04-27 NOTE — TELEPHONE ENCOUNTER
----- Message from Meg Coy sent at 4/27/2022  9:34 AM CDT -----  Contact: Marvin  Patient is calling to speak with a nurse regarding scheduling an appointment for 4/28/22. Patient reports receiving a referral from Dr. Brady and would like to schedule the earliest appointment for tomorrow with Dr. King. Please give patient a call back at [phone] when possible.   Thanks,  GH

## 2022-04-28 ENCOUNTER — OFFICE VISIT (OUTPATIENT)
Dept: NEUROSURGERY | Facility: CLINIC | Age: 72
End: 2022-04-28
Payer: MEDICARE

## 2022-04-28 ENCOUNTER — PES CALL (OUTPATIENT)
Dept: ADMINISTRATIVE | Facility: CLINIC | Age: 72
End: 2022-04-28
Payer: MEDICARE

## 2022-04-28 VITALS
DIASTOLIC BLOOD PRESSURE: 82 MMHG | RESPIRATION RATE: 16 BRPM | WEIGHT: 313 LBS | SYSTOLIC BLOOD PRESSURE: 181 MMHG | HEIGHT: 68 IN | BODY MASS INDEX: 47.44 KG/M2 | HEART RATE: 65 BPM

## 2022-04-28 DIAGNOSIS — M51.36 DDD (DEGENERATIVE DISC DISEASE), LUMBAR: Primary | ICD-10-CM

## 2022-04-28 DIAGNOSIS — M51.36 DEGENERATIVE DISC DISEASE, LUMBAR: ICD-10-CM

## 2022-04-28 DIAGNOSIS — M25.562 CHRONIC PAIN OF BOTH KNEES: ICD-10-CM

## 2022-04-28 DIAGNOSIS — M25.561 CHRONIC PAIN OF BOTH KNEES: ICD-10-CM

## 2022-04-28 DIAGNOSIS — G89.29 CHRONIC PAIN OF BOTH KNEES: ICD-10-CM

## 2022-04-28 DIAGNOSIS — M47.818 SI JOINT ARTHRITIS: ICD-10-CM

## 2022-04-28 PROCEDURE — 99204 PR OFFICE/OUTPT VISIT, NEW, LEVL IV, 45-59 MIN: ICD-10-PCS | Mod: S$PBB,,, | Performed by: NEUROLOGICAL SURGERY

## 2022-04-28 PROCEDURE — 99214 OFFICE O/P EST MOD 30 MIN: CPT | Mod: PBBFAC,PO | Performed by: NEUROLOGICAL SURGERY

## 2022-04-28 PROCEDURE — 99999 PR PBB SHADOW E&M-EST. PATIENT-LVL IV: CPT | Mod: PBBFAC,,, | Performed by: NEUROLOGICAL SURGERY

## 2022-04-28 PROCEDURE — 99204 OFFICE O/P NEW MOD 45 MIN: CPT | Mod: S$PBB,,, | Performed by: NEUROLOGICAL SURGERY

## 2022-04-28 PROCEDURE — 99999 PR PBB SHADOW E&M-EST. PATIENT-LVL IV: ICD-10-PCS | Mod: PBBFAC,,, | Performed by: NEUROLOGICAL SURGERY

## 2022-04-28 NOTE — PROGRESS NOTES
Subjective:      Patient ID: Marvin Chacon Jr. is a 71 y.o. male.    Chief Complaint: Back Pain (Pt c/o of pain in back that radiates to his feet and 4/10 in pain. Pt states that sitting too long makes it worse and resting makes it better. He is not taking any medication for the pain.)    Patient presents for longstanding back and knee pain     Patient is a pleasant 71-year-old gentleman here for evaluation for chronic lower back pain as a new patient.     Pt tells me that this pain has been ongoing for the past 20-30 years   Mostly this can be a attributed to his career working as a  with the telephone company.   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  He will occasionally get the leg pain and when he does they will radiate bilaterally with the right side being equal to the left   Note that his right hip pain is worse than his left    Notes that there is a long history of chronic degenerative changes to his knees  The left knee pain is worse than the right    He has a history of venous insufficiency requiring multiple venous treatments and he wears bilateral compression socks to prevent fluid accumulation in his lower extremities    Over the years he has tried a number of treatments  No recent physical therapy or injections      He is here today with an MRI lumbar spine without contrast from my review    Review of Systems   Constitutional: Negative for activity change, appetite change and chills.   HENT: Negative for hearing loss, sore throat and tinnitus.    Eyes: Negative for pain, discharge and itching.   Cardiovascular: Negative for chest pain.   Gastrointestinal: Negative for abdominal pain.   Endocrine: Negative for cold intolerance and heat intolerance.   Genitourinary: Negative for difficulty urinating and dysuria.   Musculoskeletal: Positive for back pain and gait problem.    Allergic/Immunologic: Negative for environmental allergies.   Neurological: Negative for dizziness, tremors, light-headedness and headaches.   Hematological: Negative for adenopathy.   Psychiatric/Behavioral: Negative for agitation, behavioral problems and confusion.         Objective:       Neurosurgery Physical Exam  Ortho/SPM Exam  Ortho Exam    Nursing note and vitals reviewed  Gen:Oriented to person, place, and time.             Appears stated age   Skin: no rashes or lesions identified   Head:Normocephalic and atraumatic.  Nose: Nose normal.    Eyes: EOM are normal. Pupils are equal, round, and reactive to light.   Neck: Neck supple. No masses or lesions palpated  Cardiovascular: Intact distal pulses.    Abdominal: Soft.   Neurological: Alert and oriented to person, place, and time.  No cranial nerve deficit.  Coordination normal. Normal muscle tone  Psychiatric: Normal mood and affect. Behavior is normal.      Back:   FA WINTER positive on the right    None  Paraspinal muscle spasms   None  Pain with flexion and extention    Limited secondary to pain Range of motion     Limited range of motion across the hips bilaterally      Motor   Right Right Left Left  Level Group   5  5  L2 Hip flexor (Psoas)   5  5  L3 Leg extension (Quads)   5  5  L4 Dorsiflexion & foot inversion (Tibialis Anterior)   5  5  L5 Great toe extension ( EHL)   5  5  S1 Foot eversion (Gastroc, PL & PB)     Sensation  NL Decreased (R/L/BL) Level Sensation    X  L2 Anterio-medial thigh   X  L3 Medial thigh around knee   X  L4 Medial foot   X  L5 Dorsum foot   X  S1 Lateral foot     Reflex  2+  Patellar tendon (L4)   2+  Achilles tendon (S1)         MRI Lumbar Spine Without Contrast    Narrative  EXAMINATION:  MRI LUMBAR SPINE WITHOUT CONTRAST    CLINICAL HISTORY:  Low back pain, progressive neurologic deficit; Dorsalgia, unspecified    TECHNIQUE:  Multiplanar, multisequence MR images were acquired from the thoracolumbar junction to the sacrum  without the administration of contrast.    COMPARISON:  Lumbar spine MRI December 28, 2012    FINDINGS:  The alignment of the lumbar spine is normal.  No fracture.  Hemangiomas are seen within the T12 and L5 vertebral bodies.  Disc desiccation noted at the L3 through S1 levels.  No significant lumbar spine disc height loss.  Conus medullaris terminates at the T12-L1 level. Distal spinal cord intensity is normal.  There is no soft tissue abnormality.    T12-L1: No disc bulge.  No significant facet arthropathy.  No neural foraminal stenosis.  No spinal canal stenosis.    L1-L2: No disc bulge.  No significant facet arthropathy.  No neural foraminal stenosis.  No spinal canal stenosis.    L2-L3: Mild diffuse disc bulge.  No significant facet arthropathy.  No neural foraminal stenosis.  No spinal canal stenosis.    L3-L4: Diffuse disc bulge is present.  No significant facet arthropathy.  Moderate to severe right and mild left neural foraminal stenosis secondary to the disc bulge encroaching upon the neural foramen.  No spinal canal stenosis.    L4-L5: Diffuse disc bulge present.  Moderate bilateral facet arthropathy.  Moderate to severe right and mild left neural foraminal stenosis secondary to the disc bulge and facet arthropathy.  No significant spinal canal stenosis.    L5-S1: Diffuse disc bulge is present.  Severe right and mild left facet arthropathy.  Moderate right and mild left neural foraminal stenosis secondary to disc bulge and facet arthropathy.  No significant spinal canal stenosis.    Impression  Multilevel lumbar spine degenerative changes resulting in areas of neural foraminal stenosis as described above.  No significant spinal canal stenosis.         Assessment:     1. DDD (degenerative disc disease), lumbar      Plan:     DDD (degenerative disc disease), lumba  -     Ambulatory referral/consult to Physical/Occupational Therapy; Future; Expected date: 05/05/2022    Patient has multiple sources for his  current symptoms  MRI of the lumbar spine does show multilevel degenerative changes without any significant severe central or foraminal stenosis  I do not have any surgical recommendations for his lumbar spine at this time    On his physical exam findings he does have limited range of motion across the right hip and evidence of SI joint pain    Also went over his imaging of the knees and he has longstanding degenerative changes to bilateral knees    I would like to refer him to outpatient physical therapy to help with his lumbar range of motion as well as to increase the range of motion across his hips bilaterally    In addition to this I will refer to pain management for right-sided SI joint injection as well as injection to his bilateral knees     I would like to see him back after the injections are completed to discuss how he is doing and see if any further treatments are warranted  Injections knee b/l and R SI joint - he would like provider in [facility] weeks after injection     Thank you for the referral   Please call with any questions    Eugene King MD  Neurosurgery     Disclaimer: This note was prepared using a voice recognition system and is likely to have sound alike errors within the text.

## 2022-04-30 ENCOUNTER — EXTERNAL CHRONIC CARE MANAGEMENT (OUTPATIENT)
Dept: PRIMARY CARE CLINIC | Facility: CLINIC | Age: 72
End: 2022-04-30
Payer: MEDICARE

## 2022-04-30 PROCEDURE — 99490 PR CHRONIC CARE MGMT, 1ST 20 MIN: ICD-10-PCS | Mod: S$PBB,,, | Performed by: FAMILY MEDICINE

## 2022-04-30 PROCEDURE — 99490 CHRNC CARE MGMT STAFF 1ST 20: CPT | Mod: PBBFAC,PO | Performed by: FAMILY MEDICINE

## 2022-04-30 PROCEDURE — 99490 CHRNC CARE MGMT STAFF 1ST 20: CPT | Mod: S$PBB,,, | Performed by: FAMILY MEDICINE

## 2022-05-02 ENCOUNTER — PATIENT MESSAGE (OUTPATIENT)
Dept: NEUROSURGERY | Facility: CLINIC | Age: 72
End: 2022-05-02
Payer: MEDICARE

## 2022-05-08 ENCOUNTER — PATIENT MESSAGE (OUTPATIENT)
Dept: NEUROSURGERY | Facility: CLINIC | Age: 72
End: 2022-05-08
Payer: MEDICARE

## 2022-05-09 ENCOUNTER — TELEPHONE (OUTPATIENT)
Dept: NEUROSURGERY | Facility: CLINIC | Age: 72
End: 2022-05-09
Payer: MEDICARE

## 2022-05-09 NOTE — TELEPHONE ENCOUNTER
"----- Message from Johnna Kearney sent at 5/9/2022  2:13 PM CDT -----  "Type:  Patient Call Back    Who Called:COMFORT PIERSON JR. [[id number]]    What is the reqeust in detail:Pt spoke with Physical Therapy and they sate they haven't received an referral for pt. Please advise    Can the clinic reply by [facility]?no    Best Call Back Number:[phone]      Additional Information:Pt would like an call [facility] and also an referral for pain management.             "

## 2022-05-09 NOTE — TELEPHONE ENCOUNTER
Spoke with pt informed pt that both orders were faxed, PT order was faxed successfully to [facility] to fax # [phone] & PM order, as well as pts demographic& Dr King's last office note was faxed over to [facility] to fax # [phone]. Pt is aware and vu.

## 2022-05-10 ENCOUNTER — TELEPHONE (OUTPATIENT)
Dept: FAMILY MEDICINE | Facility: CLINIC | Age: 72
End: 2022-05-10
Payer: MEDICARE

## 2022-05-10 DIAGNOSIS — E29.1 HYPOGONADISM IN MALE: Primary | ICD-10-CM

## 2022-05-10 RX ORDER — TESTOSTERONE CYPIONATE 200 MG/ML
200 INJECTION, SOLUTION INTRAMUSCULAR
Status: DISCONTINUED | OUTPATIENT
Start: 2022-05-12 | End: 2022-07-11

## 2022-05-10 NOTE — TELEPHONE ENCOUNTER
I have signed for the following orders AND/OR meds.  Please call the patient and ask the patient to schedule the testing AND/OR inform about any medications that were sent.      Orders Placed This Encounter   Procedures    Prior authorization Order     Order Specific Question:   What medications need authorization?     Answer:   TESTOSTERONE CYPIONATE [[id number]]     Order Specific Question:   Dose     Answer:   200 mg     Order Specific Question:   Frequency     Answer:   q 28 days       Medications Ordered This Encounter   Medications    testosterone cypionate injection 200 mg

## 2022-05-11 ENCOUNTER — TELEPHONE (OUTPATIENT)
Dept: FAMILY MEDICINE | Facility: CLINIC | Age: 72
End: 2022-05-11
Payer: MEDICARE

## 2022-05-11 NOTE — TELEPHONE ENCOUNTER
----- Message from Shey Membreno sent at 5/11/2022  9:02 AM CDT -----  Contact: self/[phone]  Patient would like to reschedule his appt to 10:00am, please call call him back at [phone]. Thanks/ar

## 2022-05-11 NOTE — TELEPHONE ENCOUNTER
Patient asked to come in around 10 tomorrow or after he is finished with PT for his nurse visit, note added to chart.

## 2022-05-12 ENCOUNTER — CLINICAL SUPPORT (OUTPATIENT)
Dept: FAMILY MEDICINE | Facility: CLINIC | Age: 72
End: 2022-05-12
Payer: MEDICARE

## 2022-05-12 DIAGNOSIS — M25.562 CHRONIC PAIN OF BOTH KNEES: Primary | ICD-10-CM

## 2022-05-12 DIAGNOSIS — M25.561 CHRONIC PAIN OF BOTH KNEES: Primary | ICD-10-CM

## 2022-05-12 DIAGNOSIS — E29.1 HYPOGONADISM MALE: Primary | ICD-10-CM

## 2022-05-12 DIAGNOSIS — G89.29 CHRONIC PAIN OF BOTH KNEES: Primary | ICD-10-CM

## 2022-05-12 DIAGNOSIS — M46.1 SACROILIITIS: Primary | ICD-10-CM

## 2022-05-12 PROCEDURE — 96372 THER/PROPH/DIAG INJ SC/IM: CPT | Mod: PBBFAC,PO

## 2022-05-12 PROCEDURE — 99212 OFFICE O/P EST SF 10 MIN: CPT | Mod: PBBFAC,PO

## 2022-05-12 PROCEDURE — 99999 PR PBB SHADOW E&M-EST. PATIENT-LVL II: ICD-10-PCS | Mod: PBBFAC,,,

## 2022-05-12 PROCEDURE — 99999 PR PBB SHADOW E&M-EST. PATIENT-LVL II: CPT | Mod: PBBFAC,,,

## 2022-05-12 RX ADMIN — TESTOSTERONE CYPIONATE 200 MG: 200 INJECTION INTRAMUSCULAR at 10:05

## 2022-05-12 NOTE — PROGRESS NOTES
Pt received test 200 mg in left vent. Pt instructed to wait 15 min post injection. Pt verb understanding and tolerated well.

## 2022-05-20 ENCOUNTER — TELEPHONE (OUTPATIENT)
Dept: PAIN MEDICINE | Facility: CLINIC | Age: 72
End: 2022-05-20
Payer: MEDICARE

## 2022-05-20 NOTE — TELEPHONE ENCOUNTER
I contacted patient to schedule d2p from Dr. King and the patient stated that he is already scheduled with Dr. Melchor a pain management doctor in [place].  He stated that he lives 69 miles from the [facility] and he is not interested in driving here for procedure.  I informed him that if he changes his mind and wants to reschedule to call us back and we would get him scheduled.

## 2022-05-26 ENCOUNTER — PATIENT MESSAGE (OUTPATIENT)
Dept: FAMILY MEDICINE | Facility: CLINIC | Age: 72
End: 2022-05-26
Payer: MEDICARE

## 2022-05-31 ENCOUNTER — EXTERNAL CHRONIC CARE MANAGEMENT (OUTPATIENT)
Dept: PRIMARY CARE CLINIC | Facility: CLINIC | Age: 72
End: 2022-05-31
Payer: MEDICARE

## 2022-05-31 PROCEDURE — 99490 PR CHRONIC CARE MGMT, 1ST 20 MIN: ICD-10-PCS | Mod: S$PBB,,, | Performed by: FAMILY MEDICINE

## 2022-05-31 PROCEDURE — 99490 CHRNC CARE MGMT STAFF 1ST 20: CPT | Mod: PBBFAC,PO | Performed by: FAMILY MEDICINE

## 2022-05-31 PROCEDURE — 99490 CHRNC CARE MGMT STAFF 1ST 20: CPT | Mod: S$PBB,,, | Performed by: FAMILY MEDICINE

## 2022-06-09 ENCOUNTER — CLINICAL SUPPORT (OUTPATIENT)
Dept: FAMILY MEDICINE | Facility: CLINIC | Age: 72
End: 2022-06-09
Payer: MEDICARE

## 2022-06-09 DIAGNOSIS — E29.1 HYPOGONADISM IN MALE: Primary | ICD-10-CM

## 2022-06-09 PROCEDURE — 96372 THER/PROPH/DIAG INJ SC/IM: CPT | Mod: PBBFAC,PO

## 2022-06-09 PROCEDURE — 99999 PR PBB SHADOW E&M-EST. PATIENT-LVL II: ICD-10-PCS | Mod: PBBFAC,,,

## 2022-06-09 PROCEDURE — 99212 OFFICE O/P EST SF 10 MIN: CPT | Mod: PBBFAC,PO

## 2022-06-09 PROCEDURE — 99999 PR PBB SHADOW E&M-EST. PATIENT-LVL II: CPT | Mod: PBBFAC,,,

## 2022-06-09 RX ADMIN — TESTOSTERONE CYPIONATE 200 MG: 200 INJECTION INTRAMUSCULAR at 01:06

## 2022-06-13 ENCOUNTER — PATIENT MESSAGE (OUTPATIENT)
Dept: NEUROSURGERY | Facility: CLINIC | Age: 72
End: 2022-06-13
Payer: MEDICARE

## 2022-06-28 ENCOUNTER — PES CALL (OUTPATIENT)
Dept: ADMINISTRATIVE | Facility: CLINIC | Age: 72
End: 2022-06-28
Payer: MEDICARE

## 2022-06-30 ENCOUNTER — EXTERNAL CHRONIC CARE MANAGEMENT (OUTPATIENT)
Dept: PRIMARY CARE CLINIC | Facility: CLINIC | Age: 72
End: 2022-06-30
Payer: MEDICARE

## 2022-06-30 PROCEDURE — 99490 CHRNC CARE MGMT STAFF 1ST 20: CPT | Mod: S$PBB,,, | Performed by: FAMILY MEDICINE

## 2022-06-30 PROCEDURE — 99439 PR CHRONIC CARE MGMT, EA ADDTL 20 MIN: ICD-10-PCS | Mod: S$PBB,,, | Performed by: FAMILY MEDICINE

## 2022-06-30 PROCEDURE — 99439 CHRNC CARE MGMT STAF EA ADDL: CPT | Mod: S$PBB,,, | Performed by: FAMILY MEDICINE

## 2022-06-30 PROCEDURE — 99490 CHRNC CARE MGMT STAFF 1ST 20: CPT | Mod: PBBFAC,PO | Performed by: FAMILY MEDICINE

## 2022-06-30 PROCEDURE — 99490 PR CHRONIC CARE MGMT, 1ST 20 MIN: ICD-10-PCS | Mod: S$PBB,,, | Performed by: FAMILY MEDICINE

## 2022-06-30 PROCEDURE — 99439 CHRNC CARE MGMT STAF EA ADDL: CPT | Mod: PBBFAC,PO | Performed by: FAMILY MEDICINE

## 2022-07-05 ENCOUNTER — PATIENT MESSAGE (OUTPATIENT)
Dept: FAMILY MEDICINE | Facility: CLINIC | Age: 72
End: 2022-07-05
Payer: MEDICARE

## 2022-07-08 ENCOUNTER — PATIENT MESSAGE (OUTPATIENT)
Dept: FAMILY MEDICINE | Facility: CLINIC | Age: 72
End: 2022-07-08
Payer: MEDICARE

## 2022-07-08 ENCOUNTER — TELEPHONE (OUTPATIENT)
Dept: FAMILY MEDICINE | Facility: CLINIC | Age: 72
End: 2022-07-08
Payer: MEDICARE

## 2022-07-08 DIAGNOSIS — E29.1 HYPOGONADISM IN MALE: Primary | ICD-10-CM

## 2022-07-11 RX ORDER — TESTOSTERONE CYPIONATE 200 MG/ML
200 INJECTION, SOLUTION INTRAMUSCULAR
Status: ACTIVE | OUTPATIENT
Start: 2022-07-12 | End: 2022-11-01

## 2022-07-11 NOTE — TELEPHONE ENCOUNTER
I have signed for the following orders AND/OR meds.  Please call the patient and ask the patient to schedule the testing AND/OR inform about any medications that were sent.      Orders Placed This Encounter   Procedures    Prior authorization Order     Order Specific Question:   What medications need authorization?     Answer:   TESTOSTERONE CYPIONATE [[id number]]     Order Specific Question:   Dose     Answer:   200 mg     Order Specific Question:   Frequency     Answer:   q 28 days       Medications Ordered This Encounter   Medications    testosterone cypionate injection 200 mg

## 2022-07-12 ENCOUNTER — CLINICAL SUPPORT (OUTPATIENT)
Dept: FAMILY MEDICINE | Facility: CLINIC | Age: 72
End: 2022-07-12
Payer: MEDICARE

## 2022-07-12 DIAGNOSIS — E29.1 HYPOGONADISM IN MALE: Primary | ICD-10-CM

## 2022-07-12 PROCEDURE — 99999 PR PBB SHADOW E&M-EST. PATIENT-LVL II: ICD-10-PCS | Mod: PBBFAC,,,

## 2022-07-12 PROCEDURE — 96372 THER/PROPH/DIAG INJ SC/IM: CPT | Mod: PBBFAC,PO

## 2022-07-12 PROCEDURE — 99999 PR PBB SHADOW E&M-EST. PATIENT-LVL II: CPT | Mod: PBBFAC,,,

## 2022-07-12 PROCEDURE — 99212 OFFICE O/P EST SF 10 MIN: CPT | Mod: PBBFAC,PO

## 2022-07-12 RX ADMIN — TESTOSTERONE CYPIONATE 200 MG: 200 INJECTION, SOLUTION INTRAMUSCULAR at 11:07

## 2022-07-31 ENCOUNTER — EXTERNAL CHRONIC CARE MANAGEMENT (OUTPATIENT)
Dept: PRIMARY CARE CLINIC | Facility: CLINIC | Age: 72
End: 2022-07-31
Payer: MEDICARE

## 2022-07-31 PROCEDURE — 99490 PR CHRONIC CARE MGMT, 1ST 20 MIN: ICD-10-PCS | Mod: S$PBB,,, | Performed by: FAMILY MEDICINE

## 2022-07-31 PROCEDURE — 99490 CHRNC CARE MGMT STAFF 1ST 20: CPT | Mod: PBBFAC,PO | Performed by: FAMILY MEDICINE

## 2022-07-31 PROCEDURE — 99490 CHRNC CARE MGMT STAFF 1ST 20: CPT | Mod: S$PBB,,, | Performed by: FAMILY MEDICINE

## 2022-08-02 ENCOUNTER — TELEPHONE (OUTPATIENT)
Dept: DERMATOLOGY | Facility: CLINIC | Age: 72
End: 2022-08-02

## 2022-08-02 ENCOUNTER — OFFICE VISIT (OUTPATIENT)
Dept: DERMATOLOGY | Facility: CLINIC | Age: 72
End: 2022-08-02
Payer: MEDICARE

## 2022-08-02 VITALS — BODY MASS INDEX: 47.45 KG/M2 | WEIGHT: 313.06 LBS | RESPIRATION RATE: 18 BRPM | HEIGHT: 68 IN

## 2022-08-02 DIAGNOSIS — L57.8 SUN-DAMAGED SKIN: ICD-10-CM

## 2022-08-02 DIAGNOSIS — L90.5 SCAR: Primary | ICD-10-CM

## 2022-08-02 DIAGNOSIS — D48.5 NEOPLASM OF UNCERTAIN BEHAVIOR OF SKIN: ICD-10-CM

## 2022-08-02 DIAGNOSIS — L91.8 CUTANEOUS SKIN TAGS: ICD-10-CM

## 2022-08-02 DIAGNOSIS — L82.0 INFLAMED SEBORRHEIC KERATOSIS: ICD-10-CM

## 2022-08-02 DIAGNOSIS — L57.0 ACTINIC KERATOSES: ICD-10-CM

## 2022-08-02 DIAGNOSIS — Z85.828 PERSONAL HISTORY OF OTHER MALIGNANT NEOPLASM OF SKIN: ICD-10-CM

## 2022-08-02 PROCEDURE — 99213 PR OFFICE/OUTPT VISIT, EST, LEVL III, 20-29 MIN: ICD-10-PCS | Mod: 25,S$PBB,, | Performed by: DERMATOLOGY

## 2022-08-02 PROCEDURE — 99213 OFFICE O/P EST LOW 20 MIN: CPT | Mod: 25,S$PBB,, | Performed by: DERMATOLOGY

## 2022-08-02 PROCEDURE — 17003 DESTRUCT PREMALG LES 2-14: CPT | Mod: S$PBB,,, | Performed by: DERMATOLOGY

## 2022-08-02 PROCEDURE — 11103 TANGNTL BX SKIN EA SEP/ADDL: CPT | Mod: PBBFAC,PO | Performed by: DERMATOLOGY

## 2022-08-02 PROCEDURE — 11103 TANGNTL BX SKIN EA SEP/ADDL: CPT | Mod: S$PBB,,, | Performed by: DERMATOLOGY

## 2022-08-02 PROCEDURE — 11200 PR REMOVAL OF SKIN TAGS, UP TO 15: ICD-10-PCS | Mod: S$PBB,59,, | Performed by: DERMATOLOGY

## 2022-08-02 PROCEDURE — 17003 DESTRUCT PREMALG LES 2-14: CPT | Mod: PBBFAC,PO | Performed by: DERMATOLOGY

## 2022-08-02 PROCEDURE — 11103 PR TANGENTIAL BIOPSY, SKIN, EA ADDTL LESION: ICD-10-PCS | Mod: S$PBB,,, | Performed by: DERMATOLOGY

## 2022-08-02 PROCEDURE — 88305 TISSUE EXAM BY PATHOLOGIST: ICD-10-PCS | Mod: 26,,, | Performed by: PATHOLOGY

## 2022-08-02 PROCEDURE — 99999 PR PBB SHADOW E&M-EST. PATIENT-LVL IV: CPT | Mod: PBBFAC,,, | Performed by: DERMATOLOGY

## 2022-08-02 PROCEDURE — 99999 PR PBB SHADOW E&M-EST. PATIENT-LVL IV: ICD-10-PCS | Mod: PBBFAC,,, | Performed by: DERMATOLOGY

## 2022-08-02 PROCEDURE — 11102 TANGNTL BX SKIN SINGLE LES: CPT | Mod: S$PBB,,, | Performed by: DERMATOLOGY

## 2022-08-02 PROCEDURE — 17000 DESTRUCT PREMALG LESION: CPT | Mod: 59,PBBFAC,PO | Performed by: DERMATOLOGY

## 2022-08-02 PROCEDURE — 17000 PR DESTRUCTION(LASER SURGERY,CRYOSURGERY,CHEMOSURGERY),PREMALIGNANT LESIONS,FIRST LESION: ICD-10-PCS | Mod: S$PBB,59,, | Performed by: DERMATOLOGY

## 2022-08-02 PROCEDURE — 11102 PR TANGENTIAL BIOPSY, SKIN, SINGLE LESION: ICD-10-PCS | Mod: S$PBB,,, | Performed by: DERMATOLOGY

## 2022-08-02 PROCEDURE — 11102 TANGNTL BX SKIN SINGLE LES: CPT | Mod: PBBFAC,PO | Performed by: DERMATOLOGY

## 2022-08-02 PROCEDURE — 11200 RMVL SKIN TAGS UP TO&INC 15: CPT | Mod: S$PBB,59,, | Performed by: DERMATOLOGY

## 2022-08-02 PROCEDURE — 99214 OFFICE O/P EST MOD 30 MIN: CPT | Mod: PBBFAC,PO,25 | Performed by: DERMATOLOGY

## 2022-08-02 PROCEDURE — 17003 DESTRUCTION, PREMALIGNANT LESIONS; SECOND THROUGH 14 LESIONS: ICD-10-PCS | Mod: S$PBB,,, | Performed by: DERMATOLOGY

## 2022-08-02 PROCEDURE — 88305 TISSUE EXAM BY PATHOLOGIST: CPT | Mod: 59 | Performed by: PATHOLOGY

## 2022-08-02 PROCEDURE — 88305 TISSUE EXAM BY PATHOLOGIST: CPT | Mod: 26,,, | Performed by: PATHOLOGY

## 2022-08-02 PROCEDURE — 11200 RMVL SKIN TAGS UP TO&INC 15: CPT | Mod: PBBFAC,PO,59 | Performed by: DERMATOLOGY

## 2022-08-02 PROCEDURE — 17000 DESTRUCT PREMALG LESION: CPT | Mod: S$PBB,59,, | Performed by: DERMATOLOGY

## 2022-08-02 NOTE — PROGRESS NOTES
Subjective:       Patient ID:  Marvin Chacon Jr. is a 72 y.o. male who presents for   Chief Complaint   Patient presents with    Spot     Patient present for UBSC. LOV 11/10/20 by LT.    C/o spot on face, rough, asx, no tx  C/o spot on left leg  C/o spot on left shoulder  C/o spots on hands  Several bothersome areas under arms, irritating. No tx      yes Phx of NMSC.  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 barger   + SCC R arm (2020)  no Fhx of melanoma.    Past Medical History:  No date: Arthritis  No date: Bronchitis  3/15/2013: Cerebrovascular disease  No date: LUGO (dyspnea on exertion)  No date: History of colon polyps      Comment:  Adenomatous polyp of colon (D12.6)  No date: History of nephrolithiasis  No date: History of seasonal allergies  No date: Hyperlipidemia LDL goal < 70  No date: Hypertension  No date: PVD (peripheral vascular disease)  No date: Skin cancer  No date: Sleep apnea      Comment:  compliant with CPAP  No date: TIA (transient ischemic attack)  No date: Venous insufficiency,        Review of Systems   Constitutional: Negative for fever, chills, weight loss, fatigue, night sweats and malaise.   HENT: Negative for headaches.    Respiratory: Negative for cough and shortness of breath.    Gastrointestinal: Negative for indigestion.   Skin: Negative for rash, daily sunscreen use, activity-related sunscreen use, recent sunburn and wears hat.   Neurological: Negative for headaches.   Hematologic/Lymphatic: Negative for adenopathy. Does not bruise/bleed easily.        Objective:    Physical Exam   Constitutional: He appears well-developed and well-nourished. He is obese.  No distress.   Eyes: Lids are normal.  No conjunctival no injection.   Cardiovascular: There is no local extremity swelling and no dependent edema.     Neurological: He is alert and oriented to person, place, and time. He is not disoriented.   Psychiatric: He has a normal mood and affect.   Skin:   Areas Examined (abnormalities noted  in diagram):   Scalp / Hair Palpated and Inspected  Head / Face Inspection Performed  Neck Inspection Performed  Chest / Axilla Inspection Performed  Abdomen Inspection Performed  Back Inspection Performed  RUE Inspected  LUE Inspection Performed                       Diagram Legend     Erythematous scaling macule/papule c/w actinic keratosis       Vascular papule c/w angioma      Pigmented verrucoid papule/plaque c/w seborrheic keratosis      Yellow umbilicated papule c/w sebaceous hyperplasia      Irregularly shaped tan macule c/w lentigo     1-2 mm smooth white papules consistent with Milia      Movable subcutaneous cyst with punctum c/w epidermal inclusion cyst      Subcutaneous movable cyst c/w pilar cyst      Firm pink to brown papule c/w dermatofibroma      Pedunculated fleshy papule(s) c/w skin tag(s)      Evenly pigmented macule c/w junctional nevus     Mildly variegated pigmented, slightly irregular-bordered macule c/w mildly atypical nevus      Flesh colored to evenly pigmented papule c/w intradermal nevus       Pink pearly papule/plaque c/w basal cell carcinoma      Erythematous hyperkeratotic cursted plaque c/w SCC      Surgical scar with no sign of skin cancer recurrence      Open and closed comedones      Inflammatory papules and pustules      Verrucoid papule consistent consistent with wart     Erythematous eczematous patches and plaques     Dystrophic onycholytic nail with subungual debris c/w onychomycosis     Umbilicated papule    Erythematous-base heme-crusted tan verrucoid plaque consistent with inflamed seborrheic keratosis     Erythematous Silvery Scaling Plaque c/w Psoriasis     See annotation                  Assessment / Plan:      Pathology Orders:     Normal Orders This Visit    Specimen to Pathology, Dermatology     Comments:    Number of Specimens:->3  ------------------------->-------------------------  Spec 1 Procedure:->Biopsy  Spec 1 Clinical Impression:->scc vs other  Spec 1  Source:->left hand  ------------------------->-------------------------  Spec 2 Procedure:->Biopsy  Spec 2 Clinical Impression:->scc vs other  Spec 2 Source:->right hand  ------------------------->-------------------------  Spec 3 Procedure:->Biopsy  Spec 3 Clinical Impression:->scc vs other  Spec 3 Source:->right arm  Release to patient->Immediate    Questions:    Procedure Type: Dermatology and skin neoplasms    Number of Specimens: 3    ------------------------: -------------------------    Spec 1 Procedure: Biopsy    Spec 1 Clinical Impression: scc vs other    Spec 1 Source: left hand    ------------------------: -------------------------    Spec 2 Procedure: Biopsy    Spec 2 Clinical Impression: scc vs other    Spec 2 Source: right hand    ------------------------: -------------------------    Spec 3 Procedure: Biopsy    Spec 3 Clinical Impression: scc vs other    Spec 3 Source: right arm    Clinical Information: hyperkeratotic plaques    Release to patient: Immediate        Scar  NER nmsc    Personal history of other malignant neoplasm of skin  Area(s) of previous NMSC evaluated with no signs of recurrence.    Upper body skin examination performed today including at least 6 points as noted in physical examination. Suspicious lesions noted.      Actinic keratoses  Cryosurgery Procedure Note    Verbal consent from the patient is obtained and the patient is aware of the precancerous quality and need for treatment of these lesions. Liquid nitrogen cryosurgery is applied to the 4 actinic keratoses, as detailed in the physical exam, to produce a freeze injury. The patient is aware that blisters may form and is instructed on wound care with gentle cleansing and use of vaseline ointment to keep moist until healed. The patient is supplied a handout on cryosurgery and is instructed to call if lesions do not completely resolve. Discussed risk postinflammatory pigmentary changes.       Neoplasm of uncertain behavior of  skin  -     Specimen to Pathology, Dermatology    Inflamed seborrheic keratosis  Cryosurgery procedure note:    Verbal consent from the patient is obtained. Liquid nitrogen cryosurgery is applied to 9 lesions to produce a freeze injury. The patient is aware that blisters may form and is instructed on wound care with gentle cleansing and use of vaseline ointment to keep moist until healed. The patient is supplied a handout on cryosurgery and is instructed to call if lesions do not completely resolve. Risk of dyspigmentation discussed.       Cutaneous skin tags  Cryosurgery procedure note:    Verbal consent from the patient is obtained. Liquid nitrogen cryosurgery is applied to 10 lesions to produce a freeze injury. The patient is aware that blisters may form and is instructed on wound care with gentle cleansing and use of vaseline ointment to keep moist until healed. The patient is supplied a handout on cryosurgery and is instructed to call if lesions do not completely resolve. Risk of dyspigmentation discussed.     Sun damaged skin  5fu/calcipotriene sent to [facility]. aaa bid x 5 days. Discussed expected irritation, stop if severe. [place] area if not resolved after first round of treatment. Treat single area at a time.           Follow up in about 4 months (around 12/2/2022).

## 2022-08-02 NOTE — TELEPHONE ENCOUNTER
----- Message from Maryam Stiles MD sent at 8/2/2022  2:26 PM CDT -----  -please refill pt's [facility] 5fu- 3rf

## 2022-08-08 ENCOUNTER — TELEPHONE (OUTPATIENT)
Dept: FAMILY MEDICINE | Facility: CLINIC | Age: 72
End: 2022-08-08
Payer: MEDICARE

## 2022-08-08 LAB
FINAL PATHOLOGIC DIAGNOSIS: NORMAL
GROSS: NORMAL
Lab: NORMAL
MICROSCOPIC EXAM: NORMAL

## 2022-08-08 NOTE — TELEPHONE ENCOUNTER
----- Message from Yanelis Lechuga sent at 8/8/2022  3:02 PM CDT -----  States he has a nurse visit tomorrow and he would like to reschedule for next Tuesday. Please call pt [phone]. Thank you

## 2022-08-16 ENCOUNTER — TELEPHONE (OUTPATIENT)
Dept: DERMATOLOGY | Facility: CLINIC | Age: 72
End: 2022-08-16
Payer: MEDICARE

## 2022-08-16 ENCOUNTER — TELEPHONE (OUTPATIENT)
Dept: FAMILY MEDICINE | Facility: CLINIC | Age: 72
End: 2022-08-16
Payer: MEDICARE

## 2022-08-16 ENCOUNTER — CLINICAL SUPPORT (OUTPATIENT)
Dept: FAMILY MEDICINE | Facility: CLINIC | Age: 72
End: 2022-08-16
Payer: MEDICARE

## 2022-08-16 DIAGNOSIS — E29.1 HYPOGONADISM IN MALE: Primary | ICD-10-CM

## 2022-08-16 PROCEDURE — 99212 OFFICE O/P EST SF 10 MIN: CPT | Mod: PBBFAC,PO

## 2022-08-16 PROCEDURE — 96372 THER/PROPH/DIAG INJ SC/IM: CPT | Mod: PBBFAC,PO

## 2022-08-16 PROCEDURE — 99999 PR PBB SHADOW E&M-EST. PATIENT-LVL II: ICD-10-PCS | Mod: PBBFAC,,,

## 2022-08-16 PROCEDURE — 99999 PR PBB SHADOW E&M-EST. PATIENT-LVL II: CPT | Mod: PBBFAC,,,

## 2022-08-16 RX ORDER — TESTOSTERONE CYPIONATE 200 MG/ML
200 INJECTION, SOLUTION INTRAMUSCULAR
Status: DISCONTINUED | OUTPATIENT
Start: 2022-08-16 | End: 2022-10-27

## 2022-08-16 RX ADMIN — TESTOSTERONE CYPIONATE 200 MG: 200 INJECTION INTRAMUSCULAR at 09:08

## 2022-08-16 NOTE — TELEPHONE ENCOUNTER
Spoke w/ pt. Informed pt about results and recommendations per provider. pt verbalized understanding.    appt for 12/6/22 will be kept.

## 2022-08-16 NOTE — TELEPHONE ENCOUNTER
----- Message from Maryam Stiles MD sent at 8/8/2022 12:51 PM CDT -----  Please call with results. Lesion on right arm was a scc but appears removed with biopsy. F/u in a few months for wound check. Other two lesions were AKs. No further treatment unless recurs.

## 2022-08-31 ENCOUNTER — EXTERNAL CHRONIC CARE MANAGEMENT (OUTPATIENT)
Dept: PRIMARY CARE CLINIC | Facility: CLINIC | Age: 72
End: 2022-08-31
Payer: MEDICARE

## 2022-08-31 PROCEDURE — 99439 PR CHRONIC CARE MGMT, EA ADDTL 20 MIN: ICD-10-PCS | Mod: S$GLB,,, | Performed by: FAMILY MEDICINE

## 2022-08-31 PROCEDURE — 99439 CHRNC CARE MGMT STAF EA ADDL: CPT | Mod: S$GLB,,, | Performed by: FAMILY MEDICINE

## 2022-08-31 PROCEDURE — 99490 PR CHRONIC CARE MGMT, 1ST 20 MIN: ICD-10-PCS | Mod: S$GLB,,, | Performed by: FAMILY MEDICINE

## 2022-08-31 PROCEDURE — 99490 CHRNC CARE MGMT STAFF 1ST 20: CPT | Mod: S$GLB,,, | Performed by: FAMILY MEDICINE

## 2022-09-02 ENCOUNTER — PATIENT MESSAGE (OUTPATIENT)
Dept: DERMATOLOGY | Facility: CLINIC | Age: 72
End: 2022-09-02
Payer: MEDICARE

## 2022-09-10 ENCOUNTER — PATIENT MESSAGE (OUTPATIENT)
Dept: DERMATOLOGY | Facility: CLINIC | Age: 72
End: 2022-09-10
Payer: MEDICARE

## 2022-09-13 ENCOUNTER — CLINICAL SUPPORT (OUTPATIENT)
Dept: FAMILY MEDICINE | Facility: CLINIC | Age: 72
End: 2022-09-13
Payer: MEDICARE

## 2022-09-13 DIAGNOSIS — E29.1 HYPOGONADISM IN MALE: Primary | ICD-10-CM

## 2022-09-13 PROCEDURE — 99999 PR PBB SHADOW E&M-EST. PATIENT-LVL II: CPT | Mod: PBBFAC,,,

## 2022-09-13 PROCEDURE — 96372 THER/PROPH/DIAG INJ SC/IM: CPT | Mod: PBBFAC,PO

## 2022-09-13 PROCEDURE — 99999 PR PBB SHADOW E&M-EST. PATIENT-LVL II: ICD-10-PCS | Mod: PBBFAC,,,

## 2022-09-13 PROCEDURE — 99212 OFFICE O/P EST SF 10 MIN: CPT | Mod: PBBFAC,PO

## 2022-09-13 RX ADMIN — TESTOSTERONE CYPIONATE 200 MG: 200 INJECTION INTRAMUSCULAR at 09:09

## 2022-09-20 ENCOUNTER — HOSPITAL ENCOUNTER (OUTPATIENT)
Dept: RADIOLOGY | Facility: HOSPITAL | Age: 72
Discharge: HOME | End: 2022-09-20
Attending: NEUROLOGICAL SURGERY
Payer: MEDICARE

## 2022-09-20 ENCOUNTER — OFFICE VISIT (OUTPATIENT)
Dept: NEUROSURGERY | Facility: CLINIC | Age: 72
End: 2022-09-20
Payer: MEDICARE

## 2022-09-20 VITALS — RESPIRATION RATE: 18 BRPM | WEIGHT: 313 LBS | HEIGHT: 68 IN | BODY MASS INDEX: 47.44 KG/M2

## 2022-09-20 DIAGNOSIS — M25.562 CHRONIC PAIN OF LEFT KNEE: ICD-10-CM

## 2022-09-20 DIAGNOSIS — M51.36 DEGENERATIVE DISC DISEASE, LUMBAR: ICD-10-CM

## 2022-09-20 DIAGNOSIS — G89.29 CHRONIC PAIN OF LEFT KNEE: ICD-10-CM

## 2022-09-20 DIAGNOSIS — G89.29 CHRONIC PAIN OF LEFT KNEE: Primary | ICD-10-CM

## 2022-09-20 DIAGNOSIS — M25.562 CHRONIC PAIN OF LEFT KNEE: Primary | ICD-10-CM

## 2022-09-20 PROCEDURE — 99999 PR PBB SHADOW E&M-EST. PATIENT-LVL IV: CPT | Mod: PBBFAC,,, | Performed by: NEUROLOGICAL SURGERY

## 2022-09-20 PROCEDURE — 99999 PR PBB SHADOW E&M-EST. PATIENT-LVL IV: ICD-10-PCS | Mod: PBBFAC,,, | Performed by: NEUROLOGICAL SURGERY

## 2022-09-20 PROCEDURE — 73562 X-RAY EXAM OF KNEE 3: CPT | Mod: 26,LT,, | Performed by: RADIOLOGY

## 2022-09-20 PROCEDURE — 73560 X-RAY EXAM OF KNEE 1 OR 2: CPT | Mod: TC,59,RT

## 2022-09-20 PROCEDURE — 99213 PR OFFICE/OUTPT VISIT, EST, LEVL III, 20-29 MIN: ICD-10-PCS | Mod: S$PBB,,, | Performed by: NEUROLOGICAL SURGERY

## 2022-09-20 PROCEDURE — 73562 XR KNEE ORTHO LEFT: ICD-10-PCS | Mod: 26,LT,, | Performed by: RADIOLOGY

## 2022-09-20 PROCEDURE — 99214 OFFICE O/P EST MOD 30 MIN: CPT | Mod: PBBFAC | Performed by: NEUROLOGICAL SURGERY

## 2022-09-20 PROCEDURE — 99213 OFFICE O/P EST LOW 20 MIN: CPT | Mod: S$PBB,,, | Performed by: NEUROLOGICAL SURGERY

## 2022-09-20 PROCEDURE — 73560 X-RAY EXAM OF KNEE 1 OR 2: CPT | Mod: 26,RT,, | Performed by: RADIOLOGY

## 2022-09-20 PROCEDURE — 73560 XR KNEE ORTHO LEFT: ICD-10-PCS | Mod: 26,RT,, | Performed by: RADIOLOGY

## 2022-09-20 RX ORDER — DICLOFENAC SODIUM 10 MG/G
2 GEL TOPICAL 2 TIMES DAILY PRN
COMMUNITY
Start: 2022-06-28

## 2022-09-20 NOTE — PROGRESS NOTES
Subjective:      Patient ID: Marvin Chacon Jr. is a 72 y.o. male.    Chief Complaint: Follow-up (Pt is here today for inj f/u pt c/o mild R hip pain rating pain 4/10. Pt states that the inj & PT gave him some relief. )    Patient presents for longstanding back and knee pain     Pt here for follow up after injection from  Dr Michel Medina MD  Pain rated as 4/10 back   Pain worse with sitting and standing   Better with rest   Ambulates unassisted     States symptoms improved for about 2 weeks after the injection   Pain worse in knees than in back   At the last appointment discussed no back surgery needed   Left knee worse than right   Hx of knee pain in past       Review of Systems   Constitutional:  Negative for activity change, appetite change and chills.   HENT:  Negative for hearing loss, sore throat and tinnitus.    Eyes:  Negative for pain, discharge and itching.   Cardiovascular:  Negative for chest pain.   Gastrointestinal:  Negative for abdominal pain.   Endocrine: Negative for cold intolerance and heat intolerance.   Genitourinary:  Negative for difficulty urinating and dysuria.   Musculoskeletal:  Positive for back pain and gait problem.   Allergic/Immunologic: Negative for environmental allergies.   Neurological:  Negative for dizziness, tremors, light-headedness and headaches.   Hematological:  Negative for adenopathy.   Psychiatric/Behavioral:  Negative for agitation, behavioral problems and confusion.        Objective:       Neurosurgery Physical Exam  Ortho/SPM Exam  Ortho Exam    Nursing note and vitals reviewed  Gen:Oriented to person, place, and time.             Appears stated age   Skin: no rashes or lesions identified   Head:Normocephalic and atraumatic.  Nose: Nose normal.    Eyes: EOM are normal. Pupils are equal, round, and reactive to light.   Neck: Neck supple. No masses or lesions palpated  Cardiovascular: Intact distal pulses.    Abdominal: Soft.   Neurological: Alert and oriented to  person, place, and time.  No cranial nerve deficit.  Coordination normal. Normal muscle tone  Psychiatric: Normal mood and affect. Behavior is normal.      Back:       None  Paraspinal muscle spasms   None  Pain with flexion and extention    Limited secondary to pain Range of motion     Limited range of motion across the hips bilaterally      Motor   Right Right Left Left  Level Group   5  5  L2 Hip flexor (Psoas)   5  5  L3 Leg extension (Quads)   5  5  L4 Dorsiflexion & foot inversion (Tibialis Anterior)   5  5  L5 Great toe extension ( EHL)   5  5  S1 Foot eversion (Gastroc, PL & PB)     Sensation  NL Decreased (R/L/BL) Level Sensation    X  L2 Anterio-medial thigh   X  L3 Medial thigh around knee   X  L4 Medial foot   X  L5 Dorsum foot   X  S1 Lateral foot     Reflex  2+  Patellar tendon (L4)   2+  Achilles tendon (S1)         MRI Lumbar Spine Without Contrast    Narrative  EXAMINATION:  MRI LUMBAR SPINE WITHOUT CONTRAST    CLINICAL HISTORY:  Low back pain, progressive neurologic deficit; Dorsalgia, unspecified    TECHNIQUE:  Multiplanar, multisequence MR images were acquired from the thoracolumbar junction to the sacrum without the administration of contrast.    COMPARISON:  Lumbar spine MRI December 28, 2012    FINDINGS:  The alignment of the lumbar spine is normal.  No fracture.  Hemangiomas are seen within the T12 and L5 vertebral bodies.  Disc desiccation noted at the L3 through S1 levels.  No significant lumbar spine disc height loss.  Conus medullaris terminates at the T12-L1 level. Distal spinal cord intensity is normal.  There is no soft tissue abnormality.    T12-L1: No disc bulge.  No significant facet arthropathy.  No neural foraminal stenosis.  No spinal canal stenosis.    L1-L2: No disc bulge.  No significant facet arthropathy.  No neural foraminal stenosis.  No spinal canal stenosis.    L2-L3: Mild diffuse disc bulge.  No significant facet arthropathy.  No neural foraminal stenosis.  No spinal  canal stenosis.    L3-L4: Diffuse disc bulge is present.  No significant facet arthropathy.  Moderate to severe right and mild left neural foraminal stenosis secondary to the disc bulge encroaching upon the neural foramen.  No spinal canal stenosis.    L4-L5: Diffuse disc bulge present.  Moderate bilateral facet arthropathy.  Moderate to severe right and mild left neural foraminal stenosis secondary to the disc bulge and facet arthropathy.  No significant spinal canal stenosis.    L5-S1: Diffuse disc bulge is present.  Severe right and mild left facet arthropathy.  Moderate right and mild left neural foraminal stenosis secondary to disc bulge and facet arthropathy.  No significant spinal canal stenosis.    Impression  Multilevel lumbar spine degenerative changes resulting in areas of neural foraminal stenosis as described above.  No significant spinal canal stenosis.         Assessment:     1. Chronic pain of left knee    2. Degenerative disc disease, lumbar      Plan:     DDD (degenerative disc disease), lumba  Chronic left knee pain       Pt seen today for follow up   L knee pain chronic  Again went over no lumbar surgery   Back injection helped some   Would like to refer to ortho for eval of his L knee   XR on his way out of clinic       Thank you for the referral   Please call with any questions    Eugene King MD  Neurosurgery     Disclaimer: This note was prepared using a voice recognition system and is likely to have sound alike errors within the text.

## 2022-09-28 ENCOUNTER — TELEPHONE (OUTPATIENT)
Dept: FAMILY MEDICINE | Facility: CLINIC | Age: 72
End: 2022-09-28
Payer: MEDICARE

## 2022-09-28 ENCOUNTER — E-VISIT (OUTPATIENT)
Dept: FAMILY MEDICINE | Facility: CLINIC | Age: 72
End: 2022-09-28
Payer: MEDICARE

## 2022-09-28 ENCOUNTER — PATIENT MESSAGE (OUTPATIENT)
Dept: FAMILY MEDICINE | Facility: CLINIC | Age: 72
End: 2022-09-28

## 2022-09-28 DIAGNOSIS — I10 ESSENTIAL HYPERTENSION: ICD-10-CM

## 2022-09-28 DIAGNOSIS — R39.198 DIFFICULTY IN URINATION: Primary | ICD-10-CM

## 2022-09-28 DIAGNOSIS — Z12.5 ENCOUNTER FOR PROSTATE CANCER SCREENING: ICD-10-CM

## 2022-09-28 PROCEDURE — 99421 PR E&M, ONLINE DIGIT, EST, < 7 DAYS, 5-10 MINS: ICD-10-PCS | Mod: ,,, | Performed by: FAMILY MEDICINE

## 2022-09-28 PROCEDURE — 99421 OL DIG E/M SVC 5-10 MIN: CPT | Mod: ,,, | Performed by: FAMILY MEDICINE

## 2022-09-28 NOTE — PROGRESS NOTES
Patient ID: Marvin Chacon Jr. is a 72 y.o. male.    Chief Complaint: Urinary Tract Infection    The patient initiated a request through [employer] on 9/28/2022 for evaluation and management with a chief complaint of Urinary Tract Infection     I evaluated the questionnaire submission on 09/28/2022  .    Oh[facility] Peq Evisit Uti Questionnaire    9/28/2022 11:43 AM CDT - Filed by Patient   Do you agree to participate in an E-Visit? Yes   If you have any of the following problems, please present to your local ER or call 911:  I acknowledge   What is the main issue that you would like for your doctor to address today? Ueinary issues   Are you able to take your vital signs? Yes   Systolic Blood Pressure: 142   Diastolic Blood Pressure: 82   Weight: 311   Height: 68   Pulse: 64   Temp: 97.7   Resp:    Pulse Ox: 92   What symptoms do you currently have? Difficulty passing urine   When did your symptoms first appear? 3/2/2020   List what you have done or taken to help your symptoms. Fluid pills   Please indicate whether you have had the following symptoms during the past 24 hours     Urgent urination (a sudden and uncontrollable urge to urinate) None   Frequent urination of small amounts of urine (going to the toilet very often) None   Burning pain when urinating None   Incomplete bladder emptying (still feel like you need to urinate again after urination) Moderate   Pain not associated with urination in the lower abdomen below the belly button) Mild   What does your urine look like? Clear   Blood seen in the urine (without menstrual cycle) None   Flank pain (pain in one or both sides of the lower back) Mild   Discharge from the urethra (urinary opening) without urination None   High body temperature/fever? None-<99.5   Please rate how much discomfort you have experience because of the symptoms in the past 24 hours: Mild   Please indicate how the symptoms have interfered with your every day activities/work in the past 24 hours:  Mild   Please indicate how these symptoms have interfered with your social activities (visiting people, meeting with friends, etc.) in the past 24 hours? Mild   Are you a diabetic? No   Provide any information you feel is important to your history not asked above I have a slow discharge that seems to go on for a lenghty time. When i feel finished and get up i have the need to continue or start again. No pain involved. No color involved. Just inconsistent bladder emptying.   Please attach any relevant images or files (if you have performed a home test for UTI, please submit a photo of results)         Active Problem List with Overview Notes    Diagnosis Date Noted    Pulmonary hypertension 03/16/2021     The estimated PA systolic pressure is 62 mmHg.  Severe ALAN  Severe morbid obesity        Nocturnal hypoxemia     Venous insufficiency 01/30/2020    Compression of vein 01/30/2020    Gastroenteritis 10/08/2019    Colon cancer screening 09/05/2019    Special screening for malignant neoplasms, colon 09/05/2019    Morbid obesity with BMI of 50.0-59.9, adult 04/03/2019     The patient presents with obesity.  Denies bulimia, amenorrhea, cold intolerance, edema, hip pain, hirsutism, knee pain, polydipsia, polyuria, thirst and weakness.  The patient does not perform regular exercise.  Previous treatments for obesity :self-directed dieting without success.  The patient and I discussed the importance of exercise.    Wt Readings from Last 4 Encounters:   04/03/19 (!) 139.3 kg (307 lb)   02/07/19 (!) 137.9 kg (304 lb)   01/03/19 (!) 138.3 kg (304 lb 12.8 oz)   07/26/18 (!) 137.1 kg (302 lb 3.2 oz)                 Behaviorally induced insufficient sleep syndrome     Periodic limb movement disorder (PLMD)     Chronic edema 08/12/2016    Rhinophyma 08/12/2016    Carpal tunnel syndrome 05/05/2016    Cubital tunnel syndrome 10/28/2015    Bilateral carpal tunnel syndrome 10/28/2015    ALAN treated with BiPAP 10/12/2015     /hr.  BIPAP 17/14. DME [facility]  2/5/2021   The patient is using a nasal mask and during the sleep study was noted to have significant air leak through his mouth.  Patient reports that he feels that this can be controlled with the use of his false teeth at night and he does not want to use a chinstrap at this time.  Also refused to use full face mask.  The most recent BiPAP titration study demonstrated a significant increase in airway pressures that were needed to decrease his apnea-hypopnea index (22/17).  I feel that he is unlikely that he will be of the tolerate the elevated BiPAP pressure that is recommended.  For that reason well place patient on BiPAP of 20/16 and follow-up with an overnight oxygen saturation study while on BiPAP.        Male hypogonadism 07/22/2015    History of colon polyps 06/11/2015    Colon polyps 06/11/2015    Family history of colon cancer 06/11/2015     His mother had colon cancer      Erectile disorder due to medical condition in male patient 03/04/2015    Hypogonadism male 02/26/2015    Cerebrovascular disease 03/15/2013    History of CVA (cerebrovascular accident)      found on CT and MRI  MRI Brain W WO Contrast  Order: [id number]  Status:  Final result   Visible to patient:  Yes (Patient Portal) Next appt:  02/15/2021 at 08:45 AM in Pulmonology (PULMONARY [facility], [place]) Dx:  CVA (cerebral infarction)  Details    Reading Physician Reading Date Result Priority   Kiley Dillon MD  [phone] 11/20/2012       Narrative  Comparison: CT of the head without and with contrast-11/13/2012.     Technique: Multiplanar MR imaging of the brain was performed both before and following the intravenous administration of 15 cc of Multihance contrast material.   Findings:   There is a large area of remote encephalomalacia/gliosis that correlates with a prior area of left frontal infarction.  There is associated compensatory enlargement of the left lateral ventricle.  There is a focus of remote infarction  involving the   superior left thalamus which extends into the left corona radiata.  There are multiple foci of remote infarction observed within both cerebellar hemispheres, left greater than right.  There is a remote focus of infarction present within the posterior   left parietal subcortical white matter. There are punctate and confluent foci of FLAIR signal hyperintensity observed within the periventricular and subcortical white matter of the frontal and parietal lobes.  There is a possible focus of remote   infarction involving the right aspect of the rostrum of the corpus callosum.  No definite findings to suggest a demyelinating process.  There is age-appropriate cerebral volume loss.  No hydrocephalus.  No acute/recent major vascular territory cerebral   infarction, parenchymal hemorrhage, enhancing intra-axial mass, or enhancing vascular malformation.  No abnormal dural or leptomeningeal enhancement.  No extra-axial fluid collections or blood products.  No enhancing extra-axial masses.     The visualized paranasal sinuses and mastoid air cells are clear.  The sella and suprasellar region are unremarkable.  No Chiari malformation.  The skull base flow voids show no definite abnormality.  Impression  1.  Multiple areas of remote infarction involving both cerebellar hemispheres, the left thalamus and left corona radiata, right rostrum of the corpus callosum and left frontal cortex.  No evidence of acute intracranial abnormality.   2.  Chronic microvascular ischemic changes which are somewhat pronounced for the patient's chronologic age.   Electronically signed by: Kiley Dillon MD   Date: 11/20/12   Time: 15:46               Hyperlipidemia     Hypertension     Phlebitis and thrombophlebitis of unspecified site 05/03/2011      Recent Labs Obtained:  No visits with results within 7 Day(s) from this visit.   Latest known visit with results is:   Office Visit on 08/02/2022   Component Date Value Ref Range Status  "   Final Pathologic Diagnosis 08/02/2022    Final                    Value:1. Skin, left hand, shave biopsy:  -ACTINIC KERATOSIS, HYPERTROPHIC  This lesion is atypical and further treatment may be required. You will be  contacted by your provider's office.  2. Skin, right hand, shave biopsy:  -ACTINIC KERATOSIS, HYPERTROPHIC  This lesion is atypical and further treatment may be required. You will be  contacted by your provider's office.  3. Skin, right arm, shave biopsy:  -SUPERFICIALLY INVASIVE SQUAMOUS CELL CARCINOMA, EXCISED IN THE PLANES OF  SECTION EXAMINED  This lesion is skin cancer. You will be contacted regarding treatment.      Interp By Colby Torres M.D., Signed on 08/08/2022 at 09:27    Gross 08/02/2022    Final                    Value:Pathology ID/Patient ID:  [id number]  Received in 3 parts:  Part 1:  Pathology ID/Patient ID: [id number]  The specimen is received in formalin labeled "left hand".  The specimen  consists of a circular shave of tan-yellow skin measuring 0.9 x 0.8 cm .  The  specimen is bisected,  inked blue at the resection margin and submitted  entirely in cassette [id number]  Part 2:  Pathology ID/Patient ID:[id number]  The specimen is received in formalin labeled "right hand".  The specimen  consists of a circular shave of tan-yellow skin measuring 1.2 x 0.8 cm .  The  specimen is trisected,  inked blue at the resection margin and submitted  entirely in cassette [id number]  Part 3:  Pathology ID/Patient ID: [id number]  The specimen is received in formalin labeled "right arm".  The specimen  consists of a  circular shave of tan-yellow skin measuring 1.0 x 1.1 cm .  The specimen is trisected,  inked blue at the resection margin and submitted  entirely in cassette [id number]  JEROME Bedolla M                          S      Microscopic Exam 08/02/2022    Final                    Value:1. The lesion is characterized by pronounced hyperkeratosis, focal  parakeratosis, and a " disorderly arrangement of keratinocytes that vary in  size and shape.  2. The lesion is characterized by pronounced hyperkeratosis, focal  parakeratosis, and a disorderly arrangement of keratinocytes that vary in  size and shape.  3.  Shave biopsy sections show the background of a hypertrophic actinic  keratosis.  However there are lobules of atypical keratinocytes pushing into  the superficial dermis.  The lesion appears excised in the planes of section  examined and approximately 0.4 mm from the deep biopsy edge.      Disclaimer 08/02/2022    Final                    Value:Unless the case is a 'gross only' or additional testing only, the final  diagnosis for each specimen is based on a microscopic examination of  appropriate tissue sections.         Encounter Diagnoses   Name Primary?    Difficulty in urination Yes    Encounter for prostate cancer screening     Essential hypertension         Orders Placed This Encounter   Procedures    Urinalysis, Reflex to Urine Culture Urine, Clean Catch     Standing Status:   Future     Standing Expiration Date:   10/28/2022     Order Specific Question:   Preferred Collection Type     Answer:   Urine, Clean Catch     Order Specific Question:   Specimen Source     Answer:   Urine    Comprehensive Metabolic Panel     Standing Status:   Future     Standing Expiration Date:   9/28/2023    Lipid Panel     Standing Status:   Future     Standing Expiration Date:   9/28/2023    PSA, Screening     Standing Status:   Future     Standing Expiration Date:   9/29/2023            E-Visit Time Tracking:    Day 1 Time (in minutes): 7     Total Time (in minutes): 7

## 2022-09-30 ENCOUNTER — PATIENT MESSAGE (OUTPATIENT)
Dept: PULMONOLOGY | Facility: CLINIC | Age: 72
End: 2022-09-30
Payer: MEDICARE

## 2022-09-30 ENCOUNTER — PATIENT MESSAGE (OUTPATIENT)
Dept: FAMILY MEDICINE | Facility: CLINIC | Age: 72
End: 2022-09-30
Payer: MEDICARE

## 2022-09-30 ENCOUNTER — EXTERNAL CHRONIC CARE MANAGEMENT (OUTPATIENT)
Dept: PRIMARY CARE CLINIC | Facility: CLINIC | Age: 72
End: 2022-09-30
Payer: MEDICARE

## 2022-09-30 PROCEDURE — 99490 CHRNC CARE MGMT STAFF 1ST 20: CPT | Mod: S$PBB,,, | Performed by: FAMILY MEDICINE

## 2022-09-30 PROCEDURE — 99490 PR CHRONIC CARE MGMT, 1ST 20 MIN: ICD-10-PCS | Mod: S$PBB,,, | Performed by: FAMILY MEDICINE

## 2022-09-30 PROCEDURE — 99490 CHRNC CARE MGMT STAFF 1ST 20: CPT | Mod: PBBFAC,PO | Performed by: FAMILY MEDICINE

## 2022-10-01 NOTE — TELEPHONE ENCOUNTER
He has changed to 50 mg of metoprolol bid yesterday.  He will monitor the pulse with that.      He will also increase the torsemide 20 mg to twice a day and monitor the blood pressure.      He will monitor and let me know the results.

## 2022-10-11 ENCOUNTER — CLINICAL SUPPORT (OUTPATIENT)
Dept: FAMILY MEDICINE | Facility: CLINIC | Age: 72
End: 2022-10-11
Payer: MEDICARE

## 2022-10-11 DIAGNOSIS — E29.1 HYPOGONADISM IN MALE: Primary | ICD-10-CM

## 2022-10-11 DIAGNOSIS — Z23 NEEDS FLU SHOT: ICD-10-CM

## 2022-10-11 PROCEDURE — 99999 PR PBB SHADOW E&M-EST. PATIENT-LVL II: ICD-10-PCS | Mod: PBBFAC,,,

## 2022-10-11 PROCEDURE — 99212 OFFICE O/P EST SF 10 MIN: CPT | Mod: PBBFAC,PO,25

## 2022-10-11 PROCEDURE — 99999 PR PBB SHADOW E&M-EST. PATIENT-LVL II: CPT | Mod: PBBFAC,,,

## 2022-10-11 PROCEDURE — G0008 ADMIN INFLUENZA VIRUS VAC: HCPCS | Mod: PBBFAC,PO

## 2022-10-11 PROCEDURE — 96372 THER/PROPH/DIAG INJ SC/IM: CPT | Mod: PBBFAC,59,PO

## 2022-10-11 RX ADMIN — TESTOSTERONE CYPIONATE 200 MG: 200 INJECTION INTRAMUSCULAR at 09:10

## 2022-10-11 NOTE — PROGRESS NOTES
Administered Flu vaccine IM to left deltoid. Pt tolerated well. Administered 200 mg testosterone IM to left ventrogluteal. Pt tolerated well.

## 2022-10-12 ENCOUNTER — OFFICE VISIT (OUTPATIENT)
Dept: ORTHOPEDICS | Facility: CLINIC | Age: 72
End: 2022-10-12
Payer: MEDICARE

## 2022-10-12 VITALS — HEIGHT: 68 IN | BODY MASS INDEX: 47.44 KG/M2 | WEIGHT: 313 LBS

## 2022-10-12 DIAGNOSIS — M17.10 ARTHRITIS OF KNEE: Primary | ICD-10-CM

## 2022-10-12 DIAGNOSIS — I87.2 VENOUS INSUFFICIENCY: ICD-10-CM

## 2022-10-12 DIAGNOSIS — Z86.73 HISTORY OF CVA (CEREBROVASCULAR ACCIDENT): ICD-10-CM

## 2022-10-12 DIAGNOSIS — M25.562 CHRONIC PAIN OF LEFT KNEE: ICD-10-CM

## 2022-10-12 DIAGNOSIS — G89.29 CHRONIC PAIN OF LEFT KNEE: ICD-10-CM

## 2022-10-12 PROCEDURE — 20610 LARGE JOINT ASPIRATION/INJECTION: L KNEE: ICD-10-PCS | Mod: S$PBB,LT,, | Performed by: ORTHOPAEDIC SURGERY

## 2022-10-12 PROCEDURE — 99204 OFFICE O/P NEW MOD 45 MIN: CPT | Mod: 25,S$PBB,, | Performed by: ORTHOPAEDIC SURGERY

## 2022-10-12 PROCEDURE — 99204 PR OFFICE/OUTPT VISIT, NEW, LEVL IV, 45-59 MIN: ICD-10-PCS | Mod: 25,S$PBB,, | Performed by: ORTHOPAEDIC SURGERY

## 2022-10-12 PROCEDURE — 99999 PR PBB SHADOW E&M-EST. PATIENT-LVL IV: CPT | Mod: PBBFAC,,, | Performed by: ORTHOPAEDIC SURGERY

## 2022-10-12 PROCEDURE — 20610 DRAIN/INJ JOINT/BURSA W/O US: CPT | Mod: PBBFAC,PN,LT | Performed by: ORTHOPAEDIC SURGERY

## 2022-10-12 PROCEDURE — 99214 OFFICE O/P EST MOD 30 MIN: CPT | Mod: PBBFAC,PN | Performed by: ORTHOPAEDIC SURGERY

## 2022-10-12 PROCEDURE — 99999 PR PBB SHADOW E&M-EST. PATIENT-LVL IV: ICD-10-PCS | Mod: PBBFAC,,, | Performed by: ORTHOPAEDIC SURGERY

## 2022-10-12 RX ORDER — TRIAMCINOLONE ACETONIDE 40 MG/ML
40 INJECTION, SUSPENSION INTRA-ARTICULAR; INTRAMUSCULAR
Status: DISCONTINUED | OUTPATIENT
Start: 2022-10-12 | End: 2022-10-12 | Stop reason: HOSPADM

## 2022-10-12 RX ADMIN — TRIAMCINOLONE ACETONIDE 40 MG: 40 INJECTION, SUSPENSION INTRA-ARTICULAR; INTRAMUSCULAR at 08:10

## 2022-10-12 NOTE — PROGRESS NOTES
72 years old left greater than right knee pain for a few years time come on gradually no one time traumatic event 5 on good days 10 on bad days worse with activity relieved with rest    Exam shows tenderness the joint line without signs infection or instability skin changes in legs consistent with poor vascularity, compartments soft     X-rays show degenerative changes     Assessment: Left knee arthrosis    Plan: Kenalog injection left knee, encourage strengthening and weight loss, follow-up as needed     Imaging studies ordered and reviewed by me    Further History  Aching pain  Worse with activity  Relieved with rest  No other associated symptoms  No other radiation    Further Exam  Alert and oriented  Pleasant  Contralateral limb has appropriate range of motion for age and condition  Contralateral limb has appropriate strength for age and condition  Contralateral limb has appropriate stability  for age and condition  No adenopathy  Pulses are appropriate for current condition  Skin is intact        Chief Complaint    Chief Complaint   Patient presents with    Left Knee - Pain       HPI  Marvin CARBAJAL Oswaldo Mesa is a 72 y.o.  male who presents with       Past Medical History  Past Medical History:   Diagnosis Date    Arthritis     Bronchitis     Cerebrovascular disease 3/15/2013    LUGO (dyspnea on exertion)     History of colon polyps     Adenomatous polyp of colon (D12.6)    History of nephrolithiasis     History of seasonal allergies     Hyperlipidemia LDL goal < 70     Hypertension     PVD (peripheral vascular disease)     Skin cancer     Sleep apnea     compliant with CPAP    TIA (transient ischemic attack)     Venous insufficiency        Past Surgical History  Past Surgical History:   Procedure Laterality Date    COLONOSCOPY N/A 9/5/2019    Procedure: COLONOSCOPY;  Surgeon: Stef Brady MD;  Location: [facility];  Service: Endoscopy;  Laterality: N/A;    CYST REMOVAL      HERNIA REPAIR      umbilical hernia  repair    PHLEBOGRAPHY Bilateral 7/10/2018    Procedure: Venogram;  Surgeon: Tam Pineda MD;  Location: [facility] CATH;  Service: Cardiovascular;  Laterality: Bilateral;  bilateral iliac venography and possible iliac vein stenting via the internal jugular vein    PHLEBOGRAPHY Bilateral 1/30/2020    Procedure: Venogram, bilateral iliac;  Surgeon: Tam Pineda MD;  Location: [facility] CATH;  Service: Cardiology;  Laterality: Bilateral;    MS COLONOSCOPY,BIOPSY  4/17/2012    repeat colonoscopy 2015    rhino fyma N/A     [facility]      SPINE BIOPSY      Cyst Removed    TONSILLECTOMY      UVULOPALATOPHARYNGOPLASTY      VASCULAR SURGERY Bilateral 07/2018       Medications  Current Outpatient Medications   Medication Sig    albuterol (PROVENTIL/VENTOLIN HFA) 90 mcg/actuation inhaler Inhale 2 puffs into the lungs every 4 (four) hours as needed for Wheezing or Shortness of Breath (at bedtime).    aspirin (ECOTRIN) 81 MG EC tablet Take 81 mg by mouth once daily.    atorvastatin (LIPITOR) 40 MG tablet Take 1 tablet (40 mg total) by mouth once daily.    diclofenac sodium (VOLTAREN) 1 % Gel Apply 2 g topically 2 (two) times daily as needed.    irbesartan (AVAPRO) 300 MG tablet Take 1 tablet (300 mg total) by mouth every evening.    metoprolol tartrate (LOPRESSOR) 50 MG tablet Take 1 tablet (50 mg total) by mouth 2 (two) times daily.    multivitamin-minerals-lutein Tab Take 1 tablet by mouth once daily. Every day    torsemide (DEMADEX) 20 MG Tab Take 1 tablet (20 mg total) by mouth once daily.     Current Facility-Administered Medications   Medication    acetaminophen tablet 650 mg    diphenhydrAMINE injection 25 mg    EPINEPHrine (EPIPEN) 0.3 mg/0.3 mL pen injection 0.3 mg    methylPREDNISolone sodium succinate injection 40 mg    ondansetron disintegrating tablet 4 mg    sodium chloride 0.9% 500 mL flush bag    sodium chloride 0.9% flush 10 mL    sodium chloride 0.9% flush 10 mL    testosterone cypionate injection 200 mg     testosterone cypionate injection 200 mg       Allergies  Review of patient's allergies indicates:   Allergen Reactions    Gabapentin        Family History  Family History   Problem Relation Age of Onset    Heart disease Mother         aorata valve    Cancer Mother         Colon    Cancer Father         bone and lung    Hypertension Father     Stroke Father     Cancer Maternal Uncle         Bone    Cancer Maternal Uncle         Throat    Stroke Maternal Uncle         passed on    Thyroid disease Sister     Cancer Maternal Uncle         throat    Cancer Sister         breast & bone marrow       Social History  Social History     Socioeconomic History    Marital status:      Spouse name: Monika    Number of children: 2   Occupational History    Occupation: retired     Employer:  [employer]   Tobacco Use    Smoking status: Former     Packs/day: 0.50     Years: 10.00     Pack years: 5.00     Types: Cigarettes     Start date: 1970     Quit date: 1982     Years since quittin.4    Smokeless tobacco: Former     Quit date: 1982    Tobacco comments:     Hated it   Substance and Sexual Activity    Alcohol use: Not Currently     Alcohol/week: 0.0 standard drinks     Comment: 33 1/2 yrs abstinent    Drug use: Not Currently     Types: Cocaine, Marijuana     Comment: 33 1/2 yrs abstinent (CLEAN)    Sexual activity: Yes     Partners: Female     Birth control/protection: Post-menopausal               Review of Systems     Constitutional: Negative    HENT: Negative  Eyes: Negative  Respiratory: Negative  Cardiovascular: Negative  Musculoskeletal: HPI  Skin: Negative  Neurological: Negative  Hematological: Negative  Endocrine: Negative                 Physical Exam    There were no vitals filed for this visit.  Body mass index is 47.59 kg/m².  Physical Examination:     General appearance -  well appearing, and in no distress  Mental status - awake  Neck - supple  Chest -  symmetric air entry  Heart  - normal rate   Abdomen - soft      Assessment     1. Arthritis of knee    2. Chronic pain of left knee          Plan

## 2022-10-12 NOTE — PROCEDURES
Large Joint Aspiration/Injection: L knee    Date/Time: 10/12/2022 8:45 AM  Performed by: Silas Nur MD  Authorized by: Silas Nur MD     Consent Done?:  Yes (Verbal)  Timeout: prior to procedure the correct patient, procedure, and site was verified    Prep: patient was prepped and draped in usual sterile fashion      Details:  Needle Size:  21 G  Approach:  Anterolateral  Location:  Knee  Site:  L knee  Medications:  40 mg triamcinolone acetonide 40 mg/mL  Patient tolerance:  Patient tolerated the procedure well with no immediate complications

## 2022-10-16 ENCOUNTER — PATIENT MESSAGE (OUTPATIENT)
Dept: FAMILY MEDICINE | Facility: CLINIC | Age: 72
End: 2022-10-16
Payer: MEDICARE

## 2022-10-17 ENCOUNTER — HOSPITAL ENCOUNTER (OUTPATIENT)
Dept: RADIOLOGY | Facility: HOSPITAL | Age: 72
Discharge: HOME | End: 2022-10-17
Attending: INTERNAL MEDICINE
Payer: MEDICARE

## 2022-10-17 ENCOUNTER — CLINICAL SUPPORT (OUTPATIENT)
Dept: PULMONOLOGY | Facility: CLINIC | Age: 72
End: 2022-10-17
Payer: MEDICARE

## 2022-10-17 DIAGNOSIS — I50.1 CARDIAC ASTHMA: ICD-10-CM

## 2022-10-17 DIAGNOSIS — J45.20 MILD INTERMITTENT ASTHMA, UNSPECIFIED WHETHER COMPLICATED: ICD-10-CM

## 2022-10-17 PROCEDURE — 94060 PR EVAL OF BRONCHOSPASM: ICD-10-PCS | Mod: 26,S$PBB,, | Performed by: INTERNAL MEDICINE

## 2022-10-17 PROCEDURE — 94729 DIFFUSING CAPACITY: CPT | Mod: PBBFAC

## 2022-10-17 PROCEDURE — 94726 PLETHYSMOGRAPHY LUNG VOLUMES: CPT | Mod: 26,S$PBB,, | Performed by: INTERNAL MEDICINE

## 2022-10-17 PROCEDURE — 94729 DIFFUSING CAPACITY: CPT | Mod: 26,S$PBB,, | Performed by: INTERNAL MEDICINE

## 2022-10-17 PROCEDURE — 71046 X-RAY EXAM CHEST 2 VIEWS: CPT | Mod: TC

## 2022-10-17 PROCEDURE — 94726 PULM FUNCT TST PLETHYSMOGRAP: ICD-10-PCS | Mod: 26,S$PBB,, | Performed by: INTERNAL MEDICINE

## 2022-10-17 PROCEDURE — 71046 X-RAY EXAM CHEST 2 VIEWS: CPT | Mod: 26,,, | Performed by: RADIOLOGY

## 2022-10-17 PROCEDURE — 94060 EVALUATION OF WHEEZING: CPT | Mod: 26,S$PBB,, | Performed by: INTERNAL MEDICINE

## 2022-10-17 PROCEDURE — 94726 PLETHYSMOGRAPHY LUNG VOLUMES: CPT | Mod: PBBFAC

## 2022-10-17 PROCEDURE — 71046 XR CHEST PA AND LATERAL: ICD-10-PCS | Mod: 26,,, | Performed by: RADIOLOGY

## 2022-10-17 PROCEDURE — 94060 EVALUATION OF WHEEZING: CPT | Mod: PBBFAC

## 2022-10-17 PROCEDURE — 94729 PR C02/MEMBANE DIFFUSE CAPACITY: ICD-10-PCS | Mod: 26,S$PBB,, | Performed by: INTERNAL MEDICINE

## 2022-10-18 LAB
BRPFT: ABNORMAL
DLCO ADJ PRE: 22.49 ML/(MIN*MMHG) (ref 18.66–32.52)
DLCO SINGLE BREATH LLN: 18.66
DLCO SINGLE BREATH PRE REF: 87.9 %
DLCO SINGLE BREATH REF: 25.59
DLCOC SBVA LLN: 2.58
DLCOC SBVA PRE REF: 175.7 %
DLCOC SBVA REF: 3.8
DLCOC SINGLE BREATH LLN: 18.66
DLCOC SINGLE BREATH PRE REF: 87.9 %
DLCOC SINGLE BREATH REF: 25.59
DLCOVA LLN: 2.58
DLCOVA PRE REF: 175.7 %
DLCOVA PRE: 6.67 ML/(MIN*MMHG*L) (ref 2.58–5.01)
DLCOVA REF: 3.8
DLVAADJ PRE: 6.67 ML/(MIN*MMHG*L) (ref 2.58–5.01)
ERV LLN: -16448.99
ERV PRE REF: 18.3 %
ERV REF: 1.01
FEF 25 75 CHG: 31.9 %
FEF 25 75 LLN: 1.01
FEF 25 75 POST REF: 18.7 %
FEF 25 75 PRE REF: 14.1 %
FEF 25 75 REF: 2.33
FET100 CHG: -16.6 %
FEV1 CHG: 11.6 %
FEV1 FVC CHG: 3.1 %
FEV1 FVC LLN: 63
FEV1 FVC POST REF: 78.4 %
FEV1 FVC PRE REF: 76 %
FEV1 FVC REF: 76
FEV1 LLN: 2.18
FEV1 POST REF: 40.5 %
FEV1 PRE REF: 36.3 %
FEV1 REF: 3.02
FRCPLETH LLN: 2.6
FRCPLETH PREREF: 87.2 %
FRCPLETH REF: 3.59
FVC CHG: 8.2 %
FVC LLN: 2.95
FVC POST REF: 51.5 %
FVC PRE REF: 47.6 %
FVC REF: 3.97
IVC PRE: 1.88 L (ref 2.95–5)
IVC SINGLE BREATH LLN: 2.95
IVC SINGLE BREATH PRE REF: 47.4 %
IVC SINGLE BREATH REF: 3.97
MVV LLN: 99
MVV PRE REF: 30.8 %
MVV REF: 117
PEF CHG: 16.8 %
PEF LLN: 5.75
PEF POST REF: 72.6 %
PEF PRE REF: 62.2 %
PEF REF: 7.94
POST FEF 25 75: 0.43 L/S (ref 1.01–3.64)
POST FET 100: 9.83 SEC
POST FEV1 FVC: 59.82 % (ref 62.81–89.76)
POST FEV1: 1.22 L (ref 2.18–3.86)
POST FVC: 2.05 L (ref 2.95–5)
POST PEF: 5.76 L/S (ref 5.75–10.13)
PRE DLCO: 22.49 ML/(MIN*MMHG) (ref 18.66–32.52)
PRE ERV: 0.18 L (ref -16448.99–16451.01)
PRE FEF 25 75: 0.33 L/S (ref 1.01–3.64)
PRE FET 100: 11.79 SEC
PRE FEV1 FVC: 58.01 % (ref 62.81–89.76)
PRE FEV1: 1.1 L (ref 2.18–3.86)
PRE FRC PL: 3.13 L
PRE FVC: 1.89 L (ref 2.95–5)
PRE MVV: 36 L/MIN (ref 99.25–134.27)
PRE PEF: 4.93 L/S (ref 5.75–10.13)
PRE RV: 3.03 L (ref 1.9–3.25)
PRE TLC: 5 L (ref 5.59–7.89)
RAW LLN: 3.06
RAW PRE REF: 239.5 %
RAW PRE: 7.33 CMH2O*S/L (ref 3.06–3.06)
RAW REF: 3.06
RV LLN: 1.9
RV PRE REF: 117.7 %
RV REF: 2.58
RVTLC LLN: 32
RVTLC PRE REF: 147 %
RVTLC PRE: 60.64 % (ref 32.28–50.24)
RVTLC REF: 41
TLC LLN: 5.59
TLC PRE REF: 74.2 %
TLC REF: 6.74
VA PRE: 3.37 L (ref 6.59–6.59)
VA SINGLE BREATH LLN: 6.59
VA SINGLE BREATH PRE REF: 51.1 %
VA SINGLE BREATH REF: 6.59
VC LLN: 2.95
VC PRE REF: 49.6 %
VC PRE: 1.97 L (ref 2.95–5)
VC REF: 3.97
VTGRAWPRE: 3.45 L

## 2022-10-21 ENCOUNTER — OFFICE VISIT (OUTPATIENT)
Dept: PULMONOLOGY | Facility: CLINIC | Age: 72
End: 2022-10-21
Payer: MEDICARE

## 2022-10-21 VITALS
HEART RATE: 80 BPM | BODY MASS INDEX: 46.03 KG/M2 | SYSTOLIC BLOOD PRESSURE: 125 MMHG | DIASTOLIC BLOOD PRESSURE: 70 MMHG | HEIGHT: 68 IN | RESPIRATION RATE: 18 BRPM | OXYGEN SATURATION: 99 % | WEIGHT: 303.69 LBS

## 2022-10-21 DIAGNOSIS — R09.02 EXERCISE HYPOXEMIA: ICD-10-CM

## 2022-10-21 DIAGNOSIS — J45.20 MILD INTERMITTENT ASTHMA, UNSPECIFIED WHETHER COMPLICATED: ICD-10-CM

## 2022-10-21 DIAGNOSIS — I27.29 PULMONARY HYPERTENSION DUE TO SLEEP-DISORDERED BREATHING: ICD-10-CM

## 2022-10-21 DIAGNOSIS — G47.33 OSA TREATED WITH BIPAP: Primary | ICD-10-CM

## 2022-10-21 DIAGNOSIS — G47.34 NOCTURNAL HYPOXEMIA: ICD-10-CM

## 2022-10-21 DIAGNOSIS — J44.89 ASTHMA WITH COPD: ICD-10-CM

## 2022-10-21 DIAGNOSIS — G47.8 PULMONARY HYPERTENSION DUE TO SLEEP-DISORDERED BREATHING: ICD-10-CM

## 2022-10-21 DIAGNOSIS — Z76.0 MEDICATION REFILL: ICD-10-CM

## 2022-10-21 DIAGNOSIS — R06.09 DYSPNEA ON EXERTION: ICD-10-CM

## 2022-10-21 PROCEDURE — 99999 PR PBB SHADOW E&M-EST. PATIENT-LVL IV: ICD-10-PCS | Mod: PBBFAC,,, | Performed by: INTERNAL MEDICINE

## 2022-10-21 PROCEDURE — 99999 PR PBB SHADOW E&M-EST. PATIENT-LVL IV: CPT | Mod: PBBFAC,,, | Performed by: INTERNAL MEDICINE

## 2022-10-21 PROCEDURE — 99215 PR OFFICE/OUTPT VISIT, EST, LEVL V, 40-54 MIN: ICD-10-PCS | Mod: S$PBB,,, | Performed by: INTERNAL MEDICINE

## 2022-10-21 PROCEDURE — 99215 OFFICE O/P EST HI 40 MIN: CPT | Mod: S$PBB,,, | Performed by: INTERNAL MEDICINE

## 2022-10-21 PROCEDURE — 99214 OFFICE O/P EST MOD 30 MIN: CPT | Mod: PBBFAC | Performed by: INTERNAL MEDICINE

## 2022-10-21 RX ORDER — ALBUTEROL SULFATE 90 UG/1
2 AEROSOL, METERED RESPIRATORY (INHALATION) EVERY 4 HOURS PRN
Qty: 54 G | Refills: 3 | Status: SHIPPED | OUTPATIENT
Start: 2022-10-21 | End: 2022-10-21

## 2022-10-21 RX ORDER — TORSEMIDE 20 MG/1
20 TABLET ORAL DAILY
Qty: 90 TABLET | Refills: 3 | Status: SHIPPED | OUTPATIENT
Start: 2022-10-21 | End: 2022-10-24 | Stop reason: SDUPTHER

## 2022-10-21 RX ORDER — ALBUTEROL SULFATE 90 UG/1
2 AEROSOL, METERED RESPIRATORY (INHALATION) EVERY 4 HOURS PRN
Qty: 54 G | Refills: 3 | Status: SHIPPED | OUTPATIENT
Start: 2022-10-21

## 2022-10-21 RX ORDER — ALBUTEROL SULFATE 90 UG/1
2 AEROSOL, METERED RESPIRATORY (INHALATION) EVERY 4 HOURS PRN
Qty: 54 G | Refills: 3 | Status: SHIPPED | OUTPATIENT
Start: 2022-10-21 | End: 2022-10-21 | Stop reason: SDUPTHER

## 2022-10-21 NOTE — PROGRESS NOTES
Subjective:     Patient ID: Marvin Chacon Jr. is a 72 y.o. male.    Chief Complaint:  Steroid shots in back    HPI 73 y/o with right sided heart failure.  On BiPAP, using oxygen with BiPAP  [place]btains BiPAP from [facility]. [facility] [facility] [place] is usuing BiPAP regularly with nasal mask    Since last seen has gained almost 20 lbs due to fluid retention   Numbness in legs when sitting - recently had MRI attempted - scheduled for 3 weeks    Severe Obstructive Sleep Apnea and Pulmonary Hypertension:  Patient also reports today for review of recent polysomnogram with BiPAP titration.  He has a history of marked pulmonary hypertension based on echocardiogram and his sleep study confirmed significant nocturnal hypoxemia.  The patient is using a nasal mask and during the sleep study was noted to have significant air leak through his mouth.  Patient reports that he feels that this can be controlled with the use of his false teeth at night and he does not want to use a chinstrap at this time.  Also refused to use full face mask.  Using BiPAP regulalry  Getting equipment from [facility]  The most recent BiPAP titration study demonstrated a significant increase in airway pressures that were needed to decrease his apnea-hypopnea index (22/17).  I feel that he is unlikely that he will be of the tolerate the elevated BiPAP pressure that is recommended.  For that reason well place patient on BiPAP of 20/16 He had a follow-up  an overnight oxygen saturation study while on BiPAP - noted to need oxygen with BiPAP      [facility] for durable medical equipment  Changed ramp time - decreased to 5 min    Past Medical History:   Diagnosis Date    Arthritis     Bronchitis     Cerebrovascular disease 3/15/2013    LUGO (dyspnea on exertion)     History of colon polyps     Adenomatous polyp of colon (D12.6)    History of nephrolithiasis     History of seasonal allergies     Hyperlipidemia LDL goal < 70     Hypertension     PVD (peripheral  vascular disease)     Skin cancer     Sleep apnea     compliant with CPAP    TIA (transient ischemic attack)     Venous insufficiency      Past Surgical History:   Procedure Laterality Date    COLONOSCOPY N/A 9/5/2019    Procedure: COLONOSCOPY;  Surgeon: Stef Brady MD;  Location: [facility] ENDO;  Service: Endoscopy;  Laterality: N/A;    CYST REMOVAL      HERNIA REPAIR      umbilical hernia repair    PHLEBOGRAPHY Bilateral 7/10/2018    Procedure: Venogram;  Surgeon: Tam Pineda MD;  Location: [facility] CATH;  Service: Cardiovascular;  Laterality: Bilateral;  bilateral iliac venography and possible iliac vein stenting via the internal jugular vein    PHLEBOGRAPHY Bilateral 1/30/2020    Procedure: Venogram, bilateral iliac;  Surgeon: Tam Pineda MD;  Location: [facility] CATH;  Service: Cardiology;  Laterality: Bilateral;    NE COLONOSCOPY,BIOPSY  4/17/2012    repeat colonoscopy 2015    rhino fyma N/A     [facility]      SPINE BIOPSY      Cyst Removed    TONSILLECTOMY      UVULOPALATOPHARYNGOPLASTY      VASCULAR SURGERY Bilateral 07/2018     Review of patient's allergies indicates:  No Known Allergies    Current Outpatient Medications on File Prior to Visit   Medication Sig Dispense Refill    aspirin (ECOTRIN) 81 MG EC tablet Take 81 mg by mouth once daily.      atorvastatin (LIPITOR) 40 MG tablet Take 1 tablet (40 mg total) by mouth once daily. 90 tablet 3    diclofenac sodium (VOLTAREN) 1 % Gel Apply 2 g topically 2 (two) times daily as needed.      multivitamin-minerals-lutein Tab Take 1 tablet by mouth once daily. Every day      [DISCONTINUED] albuterol (PROVENTIL/VENTOLIN HFA) 90 mcg/actuation inhaler Inhale 2 puffs into the lungs every 4 (four) hours as needed for Wheezing or Shortness of Breath (at bedtime). 54 g 3    irbesartan (AVAPRO) 300 MG tablet Take 1 tablet (300 mg total) by mouth every evening. 90 tablet 3    metoprolol tartrate (LOPRESSOR) 50 MG tablet Take 1 tablet (50 mg total) by mouth 2  (two) times daily. 180 tablet 3    [DISCONTINUED] torsemide (DEMADEX) 20 MG Tab Take 1 tablet (20 mg total) by mouth once daily. 90 tablet 3     Current Facility-Administered Medications on File Prior to Visit   Medication Dose Route Frequency Provider Last Rate Last Admin    acetaminophen tablet 650 mg  650 mg Oral Once PRN Jeremy Mcgovern MD        diphenhydrAMINE injection 25 mg  25 mg Intravenous Once PRN Jeremy Mcgovern MD        EPINEPHrine (EPIPEN) 0.3 mg/0.3 mL pen injection 0.3 mg  0.3 mg Intramuscular PRN Jeremy Mcgovern MD        methylPREDNISolone sodium succinate injection 40 mg  40 mg Intravenous Once PRN Jeremy Mcgovern MD        ondansetron disintegrating tablet 4 mg  4 mg Oral Once PRN Jeremy Mcgovern MD        sodium chloride 0.9% 500 mL flush bag   Intravenous PRN Jeremy Mcgovern MD        sodium chloride 0.9% flush 10 mL  10 mL Intravenous PRN Kostas Pascual MD   10 mL at 21 1455    sodium chloride 0.9% flush 10 mL  10 mL Intravenous PRN Jeremy Mcgovern MD        testosterone cypionate injection 200 mg  200 mg Intramuscular Q28 Days Stef Brady MD   200 mg at 22 1102    testosterone cypionate injection 200 mg  200 mg Intramuscular Q28 Days Stef Brady MD   200 mg at 10/11/22 0935     Social History     Socioeconomic History    Marital status:      Spouse name: Monika    Number of children: 2   Occupational History    Occupation: retired     Employer:  [employer]   Tobacco Use    Smoking status: Former     Packs/day: 0.50     Years: 10.00     Pack years: 5.00     Types: Cigarettes     Start date: 1970     Quit date: 1982     Years since quittin.4    Smokeless tobacco: Former     Quit date: 1982    Tobacco comments:     Hated it   Substance and Sexual Activity    Alcohol use: Not Currently     Alcohol/week: 0.0 standard drinks     Comment: 33 1/2 yrs abstinent    Drug use: Not Currently     Types: Cocaine, Marijuana     Comment:  "33 1/2 yrs abstinent (CLEAN)    Sexual activity: Yes     Partners: Female     Birth control/protection: Post-menopausal     Family History   Problem Relation Age of Onset    Heart disease Mother         aorata valve    Cancer Mother         Colon    Cancer Father         bone and lung    Hypertension Father     Stroke Father     Cancer Maternal Uncle         Bone    Cancer Maternal Uncle         Throat    Stroke Maternal Uncle         passed on    Thyroid disease Sister     Cancer Maternal Uncle         throat    Cancer Sister         breast & bone marrow       Review of Systems   Constitutional:  Positive for fatigue and weakness.   Respiratory:  Positive for apnea, shortness of breath, dyspnea on extertion and Paroxysmal Nocturnal Dyspnea.    Cardiovascular:  Positive for leg swelling.   Musculoskeletal:  Positive for arthralgias.   Psychiatric/Behavioral:  Positive for sleep disturbance.      Objective:      /70   Pulse 80   Resp 18   Ht 5' 8" (1.727 m)   Wt (!) 137.8 kg (303 lb 10.9 oz)   SpO2 99%   BMI 46.17 kg/m²   Physical Exam  Vitals and nursing note reviewed.   Constitutional:       Appearance: He is well-developed. He is obese. He is ill-appearing.   HENT:      Head: Normocephalic and atraumatic.      Nose: Nose normal.   Eyes:      Conjunctiva/sclera: Conjunctivae normal.      Pupils: Pupils are equal, round, and reactive to light.   Neck:      Thyroid: No thyromegaly.      Vascular: JVD present.      Trachea: No tracheal deviation.   Cardiovascular:      Rate and Rhythm: Normal rate. Rhythm regularly irregular.      Chest Wall: PMI is displaced.      Heart sounds: Murmur heard.   Systolic murmur is present with a grade of 2/6.   Pulmonary:      Effort: Pulmonary effort is normal.      Breath sounds: Examination of the right-lower field reveals rales. Examination of the left-lower field reveals rales. Decreased breath sounds and rales present.   Abdominal:      Palpations: Abdomen is soft. " "  Musculoskeletal:         General: Normal range of motion.      Cervical back: Neck supple.   Lymphadenopathy:      Cervical: No cervical adenopathy.   Skin:     General: Skin is warm and dry.   Neurological:      Mental Status: He is alert and oriented to person, place, and time.     Personal Diagnostic Review  6 min walk      [facility] - Pulmonary Function  Six Minute Walk   SUMMARY   Ordering Provider: Kostas Pascual MD        Interpreting Provider: Kostas Pascual MD  Performing nurse/tech/RT: VKatarzyna TKatarzyna, RT  Diagnosis:  (Dyspnea on exertion; Cardiac asthma)  Height: 5' 8" (172.7 cm)  Weight: (!) 142 kg (313 lb 0.9 oz)  BMI (Calculated): 47.6              Patient Race:    Phase Oxygen Assessment Supplemental O2 Heart   Rate Blood Pressure Brandi Dyspnea Scale Rating   Resting 95 % Room Air 68 bpm 126/82 3   Exercise             Minute             1 93 % Room Air 115 bpm       2 88 % Room Air 123 bpm       3 95 % 2 L/M 120 bpm       4 96 % 2 L/M 87 bpm       5 96 % 2 L/M 100 bpm       6  96 % 2 L/M 107 bpm 142/74 5-6   Recovery             Minute             1 95 % 2 L/M 86 bpm       2 96 % 2 L/M 88 bpm       3 96 % 2 L/M 92 bpm       4 96 % 2 L/M 91 bpm 136/72 5-6      Six Minute Walk Summary  6MWT Status: completed with stops  Patient Reported: Dyspnea, Lightheadedness, Other (Comment) (Knee pain, lower back pain, chest tightness)      Interpretation:  Did the patient stop or pause?: Yes  How many times did the patient stop or pause?: 2  Stop Time 1: 170  Restart Time 1: 185  Pause Time 1: 15 seconds  Stop Time 2: 220  Restart Time 2: 284  Pause Time 2: 64 seconds  Total Time Walked (Calculated): 281 seconds  Final Partial Lap Distance (feet): 50 feet  Total Distance Meters (Calculated): 259.08 meters  Predicted Distance Meters (Calculated): 402.04 meters  Percentage of Predicted (Calculated): 64.44  Peak VO2 (Calculated): 11.75  Mets: 3.36  Has The Patient Had a Previous Six Minute Walk Test?: Yes   "   Previous 6MWT Results  Has The Patient Had a Previous Six Minute Walk Test?: Yes  Date of Previous Test: 06/17/21  Total Time Walked: 259 seconds  Total Distance (meters): 239.22  Predicted Distance (meters): 411.72 meters  Percentage of Predicted: 57.37  Percent Change (Calculated): -0.08          Pulmonary Studies Review 10/21/2022   SpO2 99   Ordering Provider -   Interpreting Provider -   Performing nurse/tech/RT -   Diagnosis -   Height 68   Weight 4858.94   BMI (Calculated) 46.2   Predicted Distance 242.02   Patient Race -   6MWT Status -   Patient Reported -   Was O2 used? -   Delivery Method -   6MW Distance walked (feet) -   Distance walked (meters) -   Did patient stop? -   How many times? -   Stop Time 1 -   Restart Time 1 -   Stop Time 2 -   Restart Time 2 -   Did patient restart? -   Type of assistive device(s) used? -   Is extra documentation required for this patient? -   Oxygen Saturation -   Supplemental Oxygen -   Heart Rate -   Blood Pressure -   Brandi Dyspnea Rating  -   Oxygen Saturation -   Supplemental Oxygen -   Heart Rate -   Blood Pressure -   Brandi Dyspnea Rating  -   Recovery Time (seconds) -   Oxygen Saturation -   Supplemental Oxygen -   Heart Rate -   Blood Pressure -   Brandi Dyspnea Rating  -   Is procedure ready for interpretation? -   Did the patient stop or pause? -   How many times did the patient stop or pause? -   Stop Time 1 -   Restart Time 1 -   Pause Time 1 -   Stop Time 2 -   Restart Time 2 -   Pause Time 2 -   Total Time Walked (Calculated) -   Total Laps Walked -   Final Partial Lap Distance (feet) -   Total Distance Feet (Calculated) -   Total Distance Meters (Calculated) -   Predicted Distance Meters (Calculated) 404.5   Percentage of Predicted (Calculated) -   Peak VO2 (Calculated) -   Mets -   Has The Patient Had a Previous Six Minute Walk Test? -   Oxygen Qualification? -   Oxygen Saturation -   Supplemental Oxygen -   Heart Rate -   Blood Pressure -   Brandi Dyspnea  Rating  -   Oxygen Saturation -   Supplemental Oxygen -   Heart Rate -   Blood Pressure -   Brandi Dyspnea Rating  -   Recovery Time (seconds) -   Oxygen Saturation -   Supplemental Oxygen -   Heart Rate -   Blood Pressure -   Brandi Dyspnea Rating  -       X-Ray Chest PA And Lateral  Narrative: EXAMINATION:  XR CHEST PA AND LATERAL    CLINICAL HISTORY:  SOB; Mild intermittent asthma, uncomplicated    TECHNIQUE:  PA and lateral views of the chest were performed.    COMPARISON:  03/16/2021    FINDINGS:  The lungs are clear, with normal appearance of pulmonary vasculature and no pleural effusion or pneumothorax.    The cardiac silhouette is normal in size. The hilar and mediastinal contours are unremarkable.    Bones are intact.  Impression: No acute abnormality.    Electronically signed by: Tysno Winston MD  Date:    10/17/2022  Time:    08:43      Office Spirometry Results:     No flowsheet data found.  Pulmonary Studies Review 10/21/2022   SpO2 99   Ordering Provider -   Interpreting Provider -   Performing nurse/tech/RT -   Diagnosis -   Height 68   Weight 4858.94   BMI (Calculated) 46.2   Predicted Distance 242.02   Patient Race -   6MWT Status -   Patient Reported -   Was O2 used? -   Delivery Method -   6MW Distance walked (feet) -   Distance walked (meters) -   Did patient stop? -   How many times? -   Stop Time 1 -   Restart Time 1 -   Stop Time 2 -   Restart Time 2 -   Did patient restart? -   Type of assistive device(s) used? -   Is extra documentation required for this patient? -   Oxygen Saturation -   Supplemental Oxygen -   Heart Rate -   Blood Pressure -   Brandi Dyspnea Rating  -   Oxygen Saturation -   Supplemental Oxygen -   Heart Rate -   Blood Pressure -   Brandi Dyspnea Rating  -   Recovery Time (seconds) -   Oxygen Saturation -   Supplemental Oxygen -   Heart Rate -   Blood Pressure -   Brandi Dyspnea Rating  -   Is procedure ready for interpretation? -   Did the patient stop or pause? -   How many times  "did the patient stop or pause? -   Stop Time 1 -   Restart Time 1 -   Pause Time 1 -   Stop Time 2 -   Restart Time 2 -   Pause Time 2 -   Total Time Walked (Calculated) -   Total Laps Walked -   Final Partial Lap Distance (feet) -   Total Distance Feet (Calculated) -   Total Distance Meters (Calculated) -   Predicted Distance Meters (Calculated) 404.5   Percentage of Predicted (Calculated) -   Peak VO2 (Calculated) -   Mets -   Has The Patient Had a Previous Six Minute Walk Test? -   Oxygen Qualification? -   Oxygen Saturation -   Supplemental Oxygen -   Heart Rate -   Blood Pressure -   Brandi Dyspnea Rating  -   Oxygen Saturation -   Supplemental Oxygen -   Heart Rate -   Blood Pressure -   Brandi Dyspnea Rating  -   Recovery Time (seconds) -   Oxygen Saturation -   Supplemental Oxygen -   Heart Rate -   Blood Pressure -   Brandi Dyspnea Rating  -     Order Questions    Question Answer Comment   Liter Flow 2    Duration With activity    Qualifying Test Performed at: Activity    Oxygen saturation at rest 95    Oxygen saturation with activity 88    Oxygen saturation with activity on oxygen 95 2 liters   Portable mode: pulse dose acceptable    Mode: Portable concentrator    Route nasal cannula    Device: home concentrator with portable concentrator    Length of need (in months): 99 mos    Patient condition with qualifying saturation COPD    Height: 5' 8" (1.727 m)    Weight: 142 kg (313 lb 0.9 oz)    Alternative treatment measures have been tried or considered and deemed clinically ineffective. Yes                Assessment:            ALAN treated with BiPAP  -     CPAP/BIPAP SUPPLIES    Dyspnea on exertion  -     Discontinue: albuterol (PROVENTIL/VENTOLIN HFA) 90 mcg/actuation inhaler; Inhale 2 puffs into the lungs every 4 (four) hours as needed for Wheezing or Shortness of Breath (at bedtime).  Dispense: 54 g; Refill: 3  -     Discontinue: albuterol (PROVENTIL/VENTOLIN HFA) 90 mcg/actuation inhaler; Inhale 2 puffs into " the lungs every 4 (four) hours as needed for Wheezing or Shortness of Breath (at bedtime).  Dispense: 54 g; Refill: 3  -     albuterol (PROVENTIL/VENTOLIN HFA) 90 mcg/actuation inhaler; Inhale 2 puffs into the lungs every 4 (four) hours as needed for Wheezing or Shortness of Breath (at bedtime).  Dispense: 54 g; Refill: 3    Mild intermittent asthma, unspecified whether complicated  -     Discontinue: albuterol (PROVENTIL/VENTOLIN HFA) 90 mcg/actuation inhaler; Inhale 2 puffs into the lungs every 4 (four) hours as needed for Wheezing or Shortness of Breath (at bedtime).  Dispense: 54 g; Refill: 3  -     Discontinue: albuterol (PROVENTIL/VENTOLIN HFA) 90 mcg/actuation inhaler; Inhale 2 puffs into the lungs every 4 (four) hours as needed for Wheezing or Shortness of Breath (at bedtime).  Dispense: 54 g; Refill: 3  -     albuterol (PROVENTIL/VENTOLIN HFA) 90 mcg/actuation inhaler; Inhale 2 puffs into the lungs every 4 (four) hours as needed for Wheezing or Shortness of Breath (at bedtime).  Dispense: 54 g; Refill: 3    Nocturnal hypoxemia  -     OXYGEN FOR HOME USE  -     HME - OTHER    Exercise hypoxemia  -     OXYGEN FOR HOME USE  -     HME - OTHER    Asthma with COPD  -     Discontinue: albuterol (PROVENTIL/VENTOLIN HFA) 90 mcg/actuation inhaler; Inhale 2 puffs into the lungs every 4 (four) hours as needed for Wheezing or Shortness of Breath (at bedtime).  Dispense: 54 g; Refill: 3  -     OXYGEN FOR HOME USE  -     Discontinue: albuterol (PROVENTIL/VENTOLIN HFA) 90 mcg/actuation inhaler; Inhale 2 puffs into the lungs every 4 (four) hours as needed for Wheezing or Shortness of Breath (at bedtime).  Dispense: 54 g; Refill: 3  -     albuterol (PROVENTIL/VENTOLIN HFA) 90 mcg/actuation inhaler; Inhale 2 puffs into the lungs every 4 (four) hours as needed for Wheezing or Shortness of Breath (at bedtime).  Dispense: 54 g; Refill: 3  -     HME - OTHER    Medication refill  -     torsemide (DEMADEX) 20 MG Tab; Take 1 tablet  (20 mg total) by mouth once daily.  Dispense: 90 tablet; Refill: 3    Pulmonary hypertension due to sleep-disordered breathing  -     Echo Saline Bubble? No; Future        Outpatient Encounter Medications as of 10/21/2022   Medication Sig Dispense Refill    aspirin (ECOTRIN) 81 MG EC tablet Take 81 mg by mouth once daily.      atorvastatin (LIPITOR) 40 MG tablet Take 1 tablet (40 mg total) by mouth once daily. 90 tablet 3    diclofenac sodium (VOLTAREN) 1 % Gel Apply 2 g topically 2 (two) times daily as needed.      multivitamin-minerals-lutein Tab Take 1 tablet by mouth once daily. Every day      [DISCONTINUED] albuterol (PROVENTIL/VENTOLIN HFA) 90 mcg/actuation inhaler Inhale 2 puffs into the lungs every 4 (four) hours as needed for Wheezing or Shortness of Breath (at bedtime). 54 g 3    albuterol (PROVENTIL/VENTOLIN HFA) 90 mcg/actuation inhaler Inhale 2 puffs into the lungs every 4 (four) hours as needed for Wheezing or Shortness of Breath (at bedtime). 54 g 3    irbesartan (AVAPRO) 300 MG tablet Take 1 tablet (300 mg total) by mouth every evening. 90 tablet 3    metoprolol tartrate (LOPRESSOR) 50 MG tablet Take 1 tablet (50 mg total) by mouth 2 (two) times daily. 180 tablet 3    torsemide (DEMADEX) 20 MG Tab Take 1 tablet (20 mg total) by mouth once daily. 90 tablet 3    [DISCONTINUED] albuterol (PROVENTIL/VENTOLIN HFA) 90 mcg/actuation inhaler Inhale 2 puffs into the lungs every 4 (four) hours as needed for Wheezing or Shortness of Breath (at bedtime). 54 g 3    [DISCONTINUED] albuterol (PROVENTIL/VENTOLIN HFA) 90 mcg/actuation inhaler Inhale 2 puffs into the lungs every 4 (four) hours as needed for Wheezing or Shortness of Breath (at bedtime). 54 g 3    [DISCONTINUED] torsemide (DEMADEX) 20 MG Tab Take 1 tablet (20 mg total) by mouth once daily. 90 tablet 3     Facility-Administered Encounter Medications as of 10/21/2022   Medication Dose Route Frequency Provider Last Rate Last Admin    acetaminophen tablet  650 mg  650 mg Oral Once PRN Jeremy Mcgovern MD        diphenhydrAMINE injection 25 mg  25 mg Intravenous Once PRN Jeremy Mcgovern MD        EPINEPHrine (EPIPEN) 0.3 mg/0.3 mL pen injection 0.3 mg  0.3 mg Intramuscular PRN Jeremy Mcgovern MD        methylPREDNISolone sodium succinate injection 40 mg  40 mg Intravenous Once PRN Jeremy Mcgovern MD        ondansetron disintegrating tablet 4 mg  4 mg Oral Once PRN Jeremy Mcgovern MD        sodium chloride 0.9% 500 mL flush bag   Intravenous PRN Jeremy Mcgovern MD        sodium chloride 0.9% flush 10 mL  10 mL Intravenous PRN Kostas Pascual MD   10 mL at 01/27/21 1455    sodium chloride 0.9% flush 10 mL  10 mL Intravenous PRN Jeremy Mcgovern MD        testosterone cypionate injection 200 mg  200 mg Intramuscular Q28 Days Stef Brady MD   200 mg at 07/12/22 1102    testosterone cypionate injection 200 mg  200 mg Intramuscular Q28 Days Stef Brady MD   200 mg at 10/11/22 0935     Plan:       Requested Prescriptions     Signed Prescriptions Disp Refills    albuterol (PROVENTIL/VENTOLIN HFA) 90 mcg/actuation inhaler 54 g 3     Sig: Inhale 2 puffs into the lungs every 4 (four) hours as needed for Wheezing or Shortness of Breath (at bedtime).    torsemide (DEMADEX) 20 MG Tab 90 tablet 3     Sig: Take 1 tablet (20 mg total) by mouth once daily.     Problem List Items Addressed This Visit       Asthma with COPD    Relevant Medications    albuterol (PROVENTIL/VENTOLIN HFA) 90 mcg/actuation inhaler    Other Relevant Orders    OXYGEN FOR HOME USE    HME - OTHER    Nocturnal hypoxemia    Relevant Orders    OXYGEN FOR HOME USE    HME - OTHER    ALAN treated with BiPAP - Primary    Overview     /hr. BIPAP 17/14. DME [facility]  2/5/2021   The patient is using a nasal mask and during the sleep study was noted to have significant air leak through his mouth.  Patient reports that he feels that this can be controlled with the use of his false teeth at night and  he does not want to use a chinstrap at this time.  Also refused to use full face mask.  The most recent BiPAP titration study demonstrated a significant increase in airway pressures that were needed to decrease his apnea-hypopnea index (22/17).  I feel that he is unlikely that he will be of the tolerate the elevated BiPAP pressure that is recommended.  For that reason well place patient on BiPAP of 20/16 and follow-up with an overnight oxygen saturation study while on BiPAP.           Relevant Orders    CPAP/BIPAP SUPPLIES     Other Visit Diagnoses       Dyspnea on exertion        Relevant Medications    albuterol (PROVENTIL/VENTOLIN HFA) 90 mcg/actuation inhaler    Mild intermittent asthma, unspecified whether complicated        Relevant Medications    albuterol (PROVENTIL/VENTOLIN HFA) 90 mcg/actuation inhaler    Exercise hypoxemia        Relevant Orders    OXYGEN FOR HOME USE    HME - OTHER    Medication refill        Relevant Medications    torsemide (DEMADEX) 20 MG Tab    Pulmonary hypertension due to sleep-disordered breathing        Relevant Orders    Echo Saline Bubble? No               Follow up in about 6 months (around 4/21/2023) for ECHO on return.    MEDICAL DECISION MAKING: Moderate to high complexity.  Overall, the multiple problems listed are of moderate to high severity that may impact quality of life and activities of daily living. Side effects of medications, treatment plan as well as options and alternatives reviewed and discussed with patient. There was counseling of patient concerning these issues.    Total time spent in counseling and coordination of care - 45  minutes of total time spent on the encounter, which includes face to face time and non-face to face time preparing to see the patient (eg, review of tests), Obtaining and/or reviewing separately obtained history, Documenting clinical information in the electronic or other health record, Independently interpreting results (not separately  reported) and communicating results to the patient/family/caregiver, or Care coordination (not separately reported).    Time was used in discussion of prognosis, risks, benefits of treatment, instructions and compliance with regimen . Discussion with other physicians and/or health care providers - home health or for use of durable medical equipment (oxygen, nebulizers, CPAP, BiPAP) occurred.

## 2022-10-24 ENCOUNTER — OFFICE VISIT (OUTPATIENT)
Dept: FAMILY MEDICINE | Facility: CLINIC | Age: 72
End: 2022-10-24
Payer: MEDICARE

## 2022-10-24 VITALS
DIASTOLIC BLOOD PRESSURE: 70 MMHG | BODY MASS INDEX: 46.11 KG/M2 | HEART RATE: 48 BPM | OXYGEN SATURATION: 90 % | RESPIRATION RATE: 18 BRPM | WEIGHT: 304.25 LBS | SYSTOLIC BLOOD PRESSURE: 122 MMHG | TEMPERATURE: 98 F | HEIGHT: 68 IN

## 2022-10-24 DIAGNOSIS — J44.89 ASTHMA WITH COPD: ICD-10-CM

## 2022-10-24 DIAGNOSIS — R06.09 DYSPNEA ON EXERTION: ICD-10-CM

## 2022-10-24 DIAGNOSIS — Z76.0 MEDICATION REFILL: ICD-10-CM

## 2022-10-24 DIAGNOSIS — I10 HYPERTENSION, UNSPECIFIED TYPE: Primary | ICD-10-CM

## 2022-10-24 DIAGNOSIS — J45.20 MILD INTERMITTENT ASTHMA, UNSPECIFIED WHETHER COMPLICATED: ICD-10-CM

## 2022-10-24 DIAGNOSIS — E78.00 PURE HYPERCHOLESTEROLEMIA: ICD-10-CM

## 2022-10-24 DIAGNOSIS — N52.9 ERECTILE DYSFUNCTION, UNSPECIFIED ERECTILE DYSFUNCTION TYPE: ICD-10-CM

## 2022-10-24 PROCEDURE — 99214 OFFICE O/P EST MOD 30 MIN: CPT | Mod: S$PBB,,, | Performed by: FAMILY MEDICINE

## 2022-10-24 PROCEDURE — 99999 PR PBB SHADOW E&M-EST. PATIENT-LVL V: ICD-10-PCS | Mod: PBBFAC,,, | Performed by: FAMILY MEDICINE

## 2022-10-24 PROCEDURE — 99214 PR OFFICE/OUTPT VISIT, EST, LEVL IV, 30-39 MIN: ICD-10-PCS | Mod: S$PBB,,, | Performed by: FAMILY MEDICINE

## 2022-10-24 PROCEDURE — 99999 PR PBB SHADOW E&M-EST. PATIENT-LVL V: CPT | Mod: PBBFAC,,, | Performed by: FAMILY MEDICINE

## 2022-10-24 PROCEDURE — 99215 OFFICE O/P EST HI 40 MIN: CPT | Mod: PBBFAC,PO | Performed by: FAMILY MEDICINE

## 2022-10-24 RX ORDER — METOPROLOL TARTRATE 50 MG/1
50 TABLET ORAL 2 TIMES DAILY
Qty: 180 TABLET | Refills: 3 | Status: SHIPPED | OUTPATIENT
Start: 2022-10-24 | End: 2023-03-21 | Stop reason: SDUPTHER

## 2022-10-24 RX ORDER — IRBESARTAN 300 MG/1
300 TABLET ORAL NIGHTLY
Qty: 90 TABLET | Refills: 3 | Status: SHIPPED | OUTPATIENT
Start: 2022-10-24 | End: 2023-10-24

## 2022-10-24 RX ORDER — TORSEMIDE 20 MG/1
20 TABLET ORAL DAILY
Qty: 90 TABLET | Refills: 3 | Status: SHIPPED | OUTPATIENT
Start: 2022-10-24 | End: 2023-01-22

## 2022-10-24 RX ORDER — SILDENAFIL 100 MG/1
100 TABLET, FILM COATED ORAL DAILY PRN
Qty: 30 TABLET | Refills: 11 | Status: SHIPPED | OUTPATIENT
Start: 2022-10-24 | End: 2023-12-06

## 2022-10-24 RX ORDER — ATORVASTATIN CALCIUM 40 MG/1
40 TABLET, FILM COATED ORAL DAILY
Qty: 90 TABLET | Refills: 3 | Status: SHIPPED | OUTPATIENT
Start: 2022-10-24

## 2022-10-24 NOTE — PROGRESS NOTES
"Subjective:      Patient ID: Marvin Chacon Jr. is a 72 y.o. male.    Chief Complaint: Follow-up ("Urine issues ") and Fatigue    Problem List Items Addressed This Visit       Asthma with COPD    Hyperlipidemia    Overview     The patient presents with hyperlipidemia.  The patient reports tolerating the medication well and is in excellent compliance.  There have been no medication side effects.  The patient denies chest pain, neuropathy, and myalgias.  The patient has reduced fat intake and has been exercising.  Current treatment has included the medications listed in the med card.    Lab Results   Component Value Date    CHOL 100 (L) 10/11/2022    CHOL 121 10/04/2021    CHOL 108 (L) 09/08/2020       Lab Results   Component Value Date    HDL 40 10/11/2022    HDL 40 10/04/2021    HDL 45 09/08/2020       Lab Results   Component Value Date    LDLCALC 39.4 (L) 10/11/2022    LDLCALC 49.4 (L) 10/04/2021    LDLCALC 42.0 (L) 09/08/2020       Lab Results   Component Value Date    TRIG 103 10/11/2022    TRIG 158 (H) 10/04/2021    TRIG 105 09/08/2020       Lab Results   Component Value Date    CHOLHDL 40.0 10/11/2022    CHOLHDL 33.1 10/04/2021    CHOLHDL 41.7 09/08/2020     Lab Results   Component Value Date    ALT 28 10/11/2022    AST 27 10/11/2022    ALKPHOS 84 10/11/2022    BILITOT 1.4 (H) 10/11/2022                  Hypertension - Primary     Other Visit Diagnoses       Medication refill        Dyspnea on exertion        Mild intermittent asthma, unspecified whether complicated        Erectile dysfunction, unspecified erectile dysfunction type            The patient also wishes to address some reduction in erectile function, and wants to know about Viagra and related medications. Exam is deferred at today's visit; will be done later if symptoms persist. He is reassured that erectile dysfunction is very common, often temporary. This group of medications is usually effective and generally safe, but expensive and sometimes " not covered by insurance. Side effects are discussed. He does not have heart disease, does not seem at risk for CAD, and is not using nitrates. He is informed deaths have occurred in men taking Viagra concurrently with nitrates and he should avoid that combination at any time. Brief sexual counseling is provided. The proper use is discussed. He will return for specific follow up of this problem as needed if symptoms persist or he doesn't respond to the medication. Further suggestions to him today include no testing or workup is indicated at this time.    The patient presents with asthma.  The patient denies chest pain, palpitations, shortness of breath, difficulty breathing, and wheezing.  The patient feels that the pattern of symptoms is stable.  Current medications used to help asthma include:   Your Quick Relief Medication: (7000h ago through 1000h from now)          Stop Sig     albuterol (PROVENTIL/VENTOLIN HFA) 90 mcg/actuation inhaler  Every 4 hours PRN        Sig: Inhale 2 puffs into the lungs every 4 (four) hours as needed for Wheezing or Shortness of Breath (at bedtime).        -- Inhale 2 puffs into the lungs every 4 (four) hours as needed for Wheezing or Shortness of Breath (at bedtime).            Your Controller Medications: (7000h ago through 1000h from now)      None           The patient presents with essential hypertension.  The patient is tolerating the medication well and is in excellent compliance.  The patient is experiencing no side effects.  Counseling was offered regarding low salt diets.  The patient has a reduced salt intake.  The patient denies chest pain, palpitations, shortness of breath, dyspnea on exertion, left or murmur neck pain, nausea, vomiting, diaphoresis, paroxysmal nocturnal dyspnea, and orthopnea.   Hypertension Medications               irbesartan (AVAPRO) 300 MG tablet Take 1 tablet (300 mg total) by mouth every evening.    metoprolol tartrate (LOPRESSOR) 50 MG tablet Take  1 tablet (50 mg total) by mouth 2 (two) times daily.    torsemide (DEMADEX) 20 MG Tab Take 1 tablet (20 mg total) by mouth once daily.                  The patient's Health Maintenance was reviewed and the following appears to be due:   Health Maintenance Due   Topic Date Due    Shingles Vaccine (1 of 2) Never done    TETANUS VACCINE  01/21/2021    COVID-19 Vaccine (4 - Booster for Moderna series) 03/14/2022       Past Medical History:  Past Medical History:   Diagnosis Date    Arthritis     Bronchitis     Cerebrovascular disease 3/15/2013    LUGO (dyspnea on exertion)     History of colon polyps     Adenomatous polyp of colon (D12.6)    History of nephrolithiasis     History of seasonal allergies     Hyperlipidemia LDL goal < 70     Hypertension     PVD (peripheral vascular disease)     Skin cancer     Sleep apnea     compliant with CPAP    TIA (transient ischemic attack)     Venous insufficiency      Past Surgical History:   Procedure Laterality Date    COLONOSCOPY N/A 9/5/2019    Procedure: COLONOSCOPY;  Surgeon: Stef Brady MD;  Location: [facility] ENDO;  Service: Endoscopy;  Laterality: N/A;    CYST REMOVAL      HERNIA REPAIR      umbilical hernia repair    PHLEBOGRAPHY Bilateral 7/10/2018    Procedure: Venogram;  Surgeon: Tam Pineda MD;  Location: [facility] CATH;  Service: Cardiovascular;  Laterality: Bilateral;  bilateral iliac venography and possible iliac vein stenting via the internal jugular vein    PHLEBOGRAPHY Bilateral 1/30/2020    Procedure: Venogram, bilateral iliac;  Surgeon: Tam Pineda MD;  Location: [facility] CATH;  Service: Cardiology;  Laterality: Bilateral;    [place] COLONOSCOPY,BIOPSY  4/17/2012    repeat colonoscopy 2015    rhino fyma N/A     [facility]      SPINE BIOPSY      Cyst Removed    TONSILLECTOMY      UVULOPALATOPHARYNGOPLASTY      VASCULAR SURGERY Bilateral 07/2018     Review of patient's allergies indicates:  No Known Allergies  Current Outpatient Medications on File Prior to  Visit   Medication Sig Dispense Refill    albuterol (PROVENTIL/VENTOLIN HFA) 90 mcg/actuation inhaler Inhale 2 puffs into the lungs every 4 (four) hours as needed for Wheezing or Shortness of Breath (at bedtime). 54 g 3    aspirin (ECOTRIN) 81 MG EC tablet Take 81 mg by mouth once daily.      atorvastatin (LIPITOR) 40 MG tablet Take 1 tablet (40 mg total) by mouth once daily. 90 tablet 3    diclofenac sodium (VOLTAREN) 1 % Gel Apply 2 g topically 2 (two) times daily as needed.      irbesartan (AVAPRO) 300 MG tablet Take 1 tablet (300 mg total) by mouth every evening. 90 tablet 3    metoprolol tartrate (LOPRESSOR) 50 MG tablet Take 1 tablet (50 mg total) by mouth 2 (two) times daily. 180 tablet 3    multivitamin-minerals-lutein Tab Take 1 tablet by mouth once daily. Every day      torsemide (DEMADEX) 20 MG Tab Take 1 tablet (20 mg total) by mouth once daily. 90 tablet 3     Current Facility-Administered Medications on File Prior to Visit   Medication Dose Route Frequency Provider Last Rate Last Admin    acetaminophen tablet 650 mg  650 mg Oral Once PRN Jeremy Mcgovern MD        diphenhydrAMINE injection 25 mg  25 mg Intravenous Once PRN Jeremy Mcgovern MD        EPINEPHrine (EPIPEN) 0.3 mg/0.3 mL pen injection 0.3 mg  0.3 mg Intramuscular PRN Jeremy Mcgovern MD        methylPREDNISolone sodium succinate injection 40 mg  40 mg Intravenous Once PRN Jeremy Mcgovern MD        ondansetron disintegrating tablet 4 mg  4 mg Oral Once PRN Jeremy Mcgovern MD        sodium chloride 0.9% 500 mL flush bag   Intravenous PRN Jeremy Mcgovern MD        sodium chloride 0.9% flush 10 mL  10 mL Intravenous PRN Kostas Pascual MD   10 mL at 01/27/21 1455    sodium chloride 0.9% flush 10 mL  10 mL Intravenous PRN Jeremy Mcgovern MD        testosterone cypionate injection 200 mg  200 mg Intramuscular Q28 Days Stef Brady MD   200 mg at 07/12/22 1102    testosterone cypionate injection 200 mg  200 mg Intramuscular Q28  Days Stef Brady MD   200 mg at 10/11/22 0935     Social History     Socioeconomic History    Marital status:      Spouse name: Monika    Number of children: 2   Occupational History    Occupation: retired     Employer:  [employer]   Tobacco Use    Smoking status: Former     Packs/day: 0.50     Years: 10.00     Pack years: 5.00     Types: Cigarettes     Start date: 1970     Quit date: 1982     Years since quittin.4    Smokeless tobacco: Former     Quit date: 1982    Tobacco comments:     Hated it   Substance and Sexual Activity    Alcohol use: Not Currently     Alcohol/week: 0.0 standard drinks     Comment: 33 1/2 yrs abstinent    Drug use: Not Currently     Types: Cocaine, Marijuana     Comment: 33 1/2 yrs abstinent (CLEAN)    Sexual activity: Yes     Partners: Female     Birth control/protection: Post-menopausal     Family History   Problem Relation Age of Onset    Heart disease Mother         aorata valve    Cancer Mother         Colon    Cancer Father         bone and lung    Hypertension Father     Stroke Father     Cancer Maternal Uncle         Bone    Cancer Maternal Uncle         Throat    Stroke Maternal Uncle         passed on    Thyroid disease Sister     Cancer Maternal Uncle         throat    Cancer Sister         breast & bone marrow       Review of Systems   Constitutional:  Negative for activity change and unexpected weight change.   HENT:  Negative for hearing loss, rhinorrhea and trouble swallowing.    Eyes:  Negative for discharge and visual disturbance.   Respiratory:  Positive for wheezing. Negative for chest tightness.    Cardiovascular:  Negative for chest pain and palpitations.   Gastrointestinal:  Negative for blood in stool, constipation, diarrhea and vomiting.   Endocrine: Negative for polydipsia and polyuria.   Genitourinary:  Positive for difficulty urinating. Negative for hematuria and urgency.   Musculoskeletal:  Positive for arthralgias. Negative  "for joint swelling and neck pain.   Neurological:  Positive for weakness. Negative for headaches.   Psychiatric/Behavioral:  Negative for confusion and dysphoric mood.      Objective:   /70 (BP Location: Left arm, Patient Position: Sitting, BP Method: Medium (Automatic))   Pulse (!) 48   Temp 98.2 °F (36.8 °C)   Resp 18   Ht 5' 8" (1.727 m)   Wt (!) 138 kg (304 lb 3.8 oz)   SpO2 (!) 90%   BMI 46.26 kg/m²     Physical Exam  Vitals reviewed.   Constitutional:       General: He is not in acute distress.     Appearance: Normal appearance. He is well-developed. He is not ill-appearing or diaphoretic.   HENT:      Head: Normocephalic and atraumatic.      Right Ear: Tympanic membrane, ear canal and external ear normal.      Left Ear: Tympanic membrane, ear canal and external ear normal.      Nose: Nose normal.      Mouth/Throat:      Pharynx: No oropharyngeal exudate.   Eyes:      General: No scleral icterus.        Right eye: No discharge.         Left eye: No discharge.      Conjunctiva/sclera: Conjunctivae normal.      Pupils: Pupils are equal, round, and reactive to light.   Neck:      Thyroid: No thyromegaly.      Vascular: No JVD.   Cardiovascular:      Rate and Rhythm: Normal rate and regular rhythm.      Heart sounds: Normal heart sounds. No murmur heard.    No friction rub. No gallop.   Pulmonary:      Effort: Pulmonary effort is normal. No respiratory distress.      Breath sounds: Normal breath sounds. No wheezing or rales.   Chest:      Chest wall: No tenderness.   Abdominal:      General: Bowel sounds are normal. There is no distension.      Palpations: Abdomen is soft. There is no mass.      Tenderness: There is no abdominal tenderness. There is no guarding or rebound.   Musculoskeletal:         General: No tenderness. Normal range of motion.      Cervical back: Normal range of motion and neck supple.   Lymphadenopathy:      Cervical: No cervical adenopathy.   Skin:     General: Skin is warm and " dry.   Neurological:      Mental Status: He is alert and oriented to person, place, and time.      Cranial Nerves: No cranial nerve deficit.      Coordination: Coordination normal.     Assessment:     1. Hypertension, unspecified type    2. Pure hypercholesterolemia    3. Medication refill    4. Dyspnea on exertion    5. Mild intermittent asthma, unspecified whether complicated    6. Asthma with COPD    7. Erectile dysfunction, unspecified erectile dysfunction type      Plan:   I am having Marvin Chacon Jr. maintain his multivitamin-minerals-lutein, aspirin, atorvastatin, irbesartan, metoprolol tartrate, diclofenac sodium, albuterol, and torsemide. We will continue to administer sodium chloride 0.9%, diphenhydrAMINE, methylPREDNISolone sodium succinate, EPINEPHrine, ondansetron, acetaminophen, sodium chloride 0.9% 500 mL flush bag, sodium chloride 0.9%, testosterone cypionate, and testosterone cypionate.  No problem-specific Assessment & Plan notes found for this encounter.      No follow-ups on file.    Marvin was seen today for follow-up and fatigue.    Diagnoses and all orders for this visit:    Hypertension, unspecified type    Pure hypercholesterolemia    Medication refill  The following orders have not been finalized:  -     irbesartan (AVAPRO) 300 MG tablet  -     atorvastatin (LIPITOR) 40 MG tablet  -     torsemide (DEMADEX) 20 MG Tab  -     metoprolol tartrate (LOPRESSOR) 50 MG tablet    Dyspnea on exertion  The following orders have not been finalized:  -     albuterol (PROVENTIL/VENTOLIN HFA) 90 mcg/actuation inhaler    Mild intermittent asthma, unspecified whether complicated  The following orders have not been finalized:  -     albuterol (PROVENTIL/VENTOLIN HFA) 90 mcg/actuation inhaler    Asthma with COPD  The following orders have not been finalized:  -     albuterol (PROVENTIL/VENTOLIN HFA) 90 mcg/actuation inhaler    Erectile dysfunction, unspecified erectile dysfunction type  The following  orders have not been finalized:  -     sildenafiL (VIAGRA) 100 MG tablet         The patient was instructed to stop the following meds:  There are no discontinued medications.  No orders of the defined types were placed in this encounter.      Medication List with Changes/Refills   Current Medications    ALBUTEROL (PROVENTIL/VENTOLIN HFA) 90 MCG/ACTUATION INHALER    Inhale 2 puffs into the lungs every 4 (four) hours as needed for Wheezing or Shortness of Breath (at bedtime).    ASPIRIN (ECOTRIN) 81 MG EC TABLET    Take 81 mg by mouth once daily.    ATORVASTATIN (LIPITOR) 40 MG TABLET    Take 1 tablet (40 mg total) by mouth once daily.    DICLOFENAC SODIUM (VOLTAREN) 1 % GEL    Apply 2 g topically 2 (two) times daily as needed.    IRBESARTAN (AVAPRO) 300 MG TABLET    Take 1 tablet (300 mg total) by mouth every evening.    METOPROLOL TARTRATE (LOPRESSOR) 50 MG TABLET    Take 1 tablet (50 mg total) by mouth 2 (two) times daily.    MULTIVITAMIN-MINERALS-LUTEIN TAB    Take 1 tablet by mouth once daily. Every day    TORSEMIDE (DEMADEX) 20 MG TAB    Take 1 tablet (20 mg total) by mouth once daily.      Medication List with Changes/Refills   Current Medications    ALBUTEROL (PROVENTIL/VENTOLIN HFA) 90 MCG/ACTUATION INHALER    Inhale 2 puffs into the lungs every 4 (four) hours as needed for Wheezing or Shortness of Breath (at bedtime).       Start Date: 10/21/2022End Date: --    ASPIRIN (ECOTRIN) 81 MG EC TABLET    Take 81 mg by mouth once daily.       Start Date: --        End Date: --    ATORVASTATIN (LIPITOR) 40 MG TABLET    Take 1 tablet (40 mg total) by mouth once daily.       Start Date: 10/4/2021 End Date: --    DICLOFENAC SODIUM (VOLTAREN) 1 % GEL    Apply 2 g topically 2 (two) times daily as needed.       Start Date: 6/28/2022 End Date: --    IRBESARTAN (AVAPRO) 300 MG TABLET    Take 1 tablet (300 mg total) by mouth every evening.       Start Date: 10/4/2021 End Date: 10/24/2022    METOPROLOL TARTRATE (LOPRESSOR) 50  MG TABLET    Take 1 tablet (50 mg total) by mouth 2 (two) times daily.       Start Date: 10/4/2021 End Date: 10/24/2022    MULTIVITAMIN-MINERALS-LUTEIN TAB    Take 1 tablet by mouth once daily. Every day       Start Date: 8/26/2011 End Date: --    TORSEMIDE (DEMADEX) 20 MG TAB    Take 1 tablet (20 mg total) by mouth once daily.       Start Date: 10/21/2022End Date: 1/19/2023

## 2022-10-31 ENCOUNTER — EXTERNAL CHRONIC CARE MANAGEMENT (OUTPATIENT)
Dept: PRIMARY CARE CLINIC | Facility: CLINIC | Age: 72
End: 2022-10-31
Payer: MEDICARE

## 2022-10-31 PROCEDURE — 99490 CHRNC CARE MGMT STAFF 1ST 20: CPT | Mod: PBBFAC,PO | Performed by: FAMILY MEDICINE

## 2022-10-31 PROCEDURE — 99490 CHRNC CARE MGMT STAFF 1ST 20: CPT | Mod: S$PBB,,, | Performed by: FAMILY MEDICINE

## 2022-10-31 PROCEDURE — 99490 PR CHRONIC CARE MGMT, 1ST 20 MIN: ICD-10-PCS | Mod: S$PBB,,, | Performed by: FAMILY MEDICINE

## 2022-11-01 ENCOUNTER — PATIENT MESSAGE (OUTPATIENT)
Dept: FAMILY MEDICINE | Facility: CLINIC | Age: 72
End: 2022-11-01
Payer: MEDICARE

## 2022-11-07 ENCOUNTER — TELEPHONE (OUTPATIENT)
Dept: FAMILY MEDICINE | Facility: CLINIC | Age: 72
End: 2022-11-07
Payer: MEDICARE

## 2022-11-07 DIAGNOSIS — E29.1 HYPOGONADISM IN MALE: Primary | ICD-10-CM

## 2022-11-07 RX ORDER — TESTOSTERONE CYPIONATE 200 MG/ML
200 INJECTION, SOLUTION INTRAMUSCULAR
Status: ACTIVE | OUTPATIENT
Start: 2022-11-08 | End: 2023-01-31

## 2022-11-08 ENCOUNTER — CLINICAL SUPPORT (OUTPATIENT)
Dept: FAMILY MEDICINE | Facility: CLINIC | Age: 72
End: 2022-11-08
Payer: MEDICARE

## 2022-11-08 DIAGNOSIS — E29.1 HYPOGONADISM IN MALE: Primary | ICD-10-CM

## 2022-11-08 PROCEDURE — 99999 PR PBB SHADOW E&M-EST. PATIENT-LVL II: CPT | Mod: PBBFAC,,,

## 2022-11-08 PROCEDURE — 96372 THER/PROPH/DIAG INJ SC/IM: CPT | Mod: PBBFAC,PO

## 2022-11-08 PROCEDURE — 99212 OFFICE O/P EST SF 10 MIN: CPT | Mod: PBBFAC,PO

## 2022-11-08 PROCEDURE — 99999 PR PBB SHADOW E&M-EST. PATIENT-LVL II: ICD-10-PCS | Mod: PBBFAC,,,

## 2022-11-08 RX ADMIN — TESTOSTERONE CYPIONATE 200 MG: 200 INJECTION, SOLUTION INTRAMUSCULAR at 09:11

## 2022-11-25 ENCOUNTER — OFFICE VISIT (OUTPATIENT)
Dept: FAMILY MEDICINE | Facility: CLINIC | Age: 72
End: 2022-11-25
Payer: MEDICARE

## 2022-11-25 VITALS
WEIGHT: 312.31 LBS | HEIGHT: 68 IN | SYSTOLIC BLOOD PRESSURE: 118 MMHG | DIASTOLIC BLOOD PRESSURE: 74 MMHG | HEART RATE: 62 BPM | TEMPERATURE: 98 F | BODY MASS INDEX: 47.33 KG/M2

## 2022-11-25 DIAGNOSIS — T24.232A BURN OF SECOND DEGREE OF LEFT LOWER LEG, INITIAL ENCOUNTER: ICD-10-CM

## 2022-11-25 DIAGNOSIS — Z23 NEED FOR DIPHTHERIA-TETANUS-PERTUSSIS (TDAP) VACCINE: ICD-10-CM

## 2022-11-25 DIAGNOSIS — L03.116 CELLULITIS OF LEFT LOWER EXTREMITY: Primary | ICD-10-CM

## 2022-11-25 PROCEDURE — 90715 TDAP VACCINE 7 YRS/> IM: CPT | Mod: PBBFAC,PO

## 2022-11-25 PROCEDURE — 99215 OFFICE O/P EST HI 40 MIN: CPT | Mod: PBBFAC,PO | Performed by: PHYSICIAN ASSISTANT

## 2022-11-25 PROCEDURE — 99999 PR PBB SHADOW E&M-EST. PATIENT-LVL V: CPT | Mod: PBBFAC,,, | Performed by: PHYSICIAN ASSISTANT

## 2022-11-25 PROCEDURE — 99999 PR PBB SHADOW E&M-EST. PATIENT-LVL V: ICD-10-PCS | Mod: PBBFAC,,, | Performed by: PHYSICIAN ASSISTANT

## 2022-11-25 PROCEDURE — 99213 PR OFFICE/OUTPT VISIT, EST, LEVL III, 20-29 MIN: ICD-10-PCS | Mod: S$PBB,,, | Performed by: PHYSICIAN ASSISTANT

## 2022-11-25 PROCEDURE — 99213 OFFICE O/P EST LOW 20 MIN: CPT | Mod: S$PBB,,, | Performed by: PHYSICIAN ASSISTANT

## 2022-11-25 RX ORDER — CLINDAMYCIN HYDROCHLORIDE 300 MG/1
300 CAPSULE ORAL 3 TIMES DAILY
COMMUNITY
Start: 2022-11-23 | End: 2023-03-06
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
## 2022-11-30 ENCOUNTER — EXTERNAL CHRONIC CARE MANAGEMENT (OUTPATIENT)
Dept: PRIMARY CARE CLINIC | Facility: CLINIC | Age: 72
End: 2022-11-30
Payer: MEDICARE

## 2022-11-30 PROCEDURE — 99490 CHRNC CARE MGMT STAFF 1ST 20: CPT | Mod: PBBFAC,PO | Performed by: FAMILY MEDICINE

## 2022-11-30 PROCEDURE — 99490 PR CHRONIC CARE MGMT, 1ST 20 MIN: ICD-10-PCS | Mod: S$PBB,,, | Performed by: FAMILY MEDICINE

## 2022-11-30 PROCEDURE — 99490 CHRNC CARE MGMT STAFF 1ST 20: CPT | Mod: S$PBB,,, | Performed by: FAMILY MEDICINE

## 2022-12-02 ENCOUNTER — PATIENT MESSAGE (OUTPATIENT)
Dept: FAMILY MEDICINE | Facility: CLINIC | Age: 72
End: 2022-12-02
Payer: MEDICARE

## 2022-12-02 NOTE — TELEPHONE ENCOUNTER
Please let the patient know that his wounds appear to be improving. We will hold off on additional antibiotics at this time. If anything worsens over the weekend, please instruct him to go to the ER. Otherwise, we will follow up in the clinic.

## 2022-12-03 ENCOUNTER — PATIENT MESSAGE (OUTPATIENT)
Dept: FAMILY MEDICINE | Facility: CLINIC | Age: 72
End: 2022-12-03
Payer: MEDICARE

## 2022-12-06 ENCOUNTER — CLINICAL SUPPORT (OUTPATIENT)
Dept: FAMILY MEDICINE | Facility: CLINIC | Age: 72
End: 2022-12-06
Payer: MEDICARE

## 2022-12-06 ENCOUNTER — TELEPHONE (OUTPATIENT)
Dept: DERMATOLOGY | Facility: CLINIC | Age: 72
End: 2022-12-06
Payer: MEDICARE

## 2022-12-06 ENCOUNTER — OFFICE VISIT (OUTPATIENT)
Dept: FAMILY MEDICINE | Facility: CLINIC | Age: 72
End: 2022-12-06
Payer: MEDICARE

## 2022-12-06 VITALS
HEIGHT: 68 IN | DIASTOLIC BLOOD PRESSURE: 75 MMHG | HEART RATE: 62 BPM | TEMPERATURE: 98 F | SYSTOLIC BLOOD PRESSURE: 132 MMHG | BODY MASS INDEX: 46.94 KG/M2 | WEIGHT: 309.75 LBS

## 2022-12-06 DIAGNOSIS — J06.9 VIRAL UPPER RESPIRATORY INFECTION: ICD-10-CM

## 2022-12-06 DIAGNOSIS — L03.116 CELLULITIS OF LEFT LOWER EXTREMITY: Primary | ICD-10-CM

## 2022-12-06 DIAGNOSIS — E29.1 HYPOGONADISM IN MALE: Primary | ICD-10-CM

## 2022-12-06 DIAGNOSIS — R05.9 COUGH, UNSPECIFIED TYPE: ICD-10-CM

## 2022-12-06 PROCEDURE — 99999 PR PBB SHADOW E&M-EST. PATIENT-LVL III: ICD-10-PCS | Mod: PBBFAC,,,

## 2022-12-06 PROCEDURE — 99999 PR PBB SHADOW E&M-EST. PATIENT-LVL IV: ICD-10-PCS | Mod: PBBFAC,,, | Performed by: PHYSICIAN ASSISTANT

## 2022-12-06 PROCEDURE — 99213 OFFICE O/P EST LOW 20 MIN: CPT | Mod: PBBFAC,PO

## 2022-12-06 PROCEDURE — 99213 PR OFFICE/OUTPT VISIT, EST, LEVL III, 20-29 MIN: ICD-10-PCS | Mod: S$PBB,,, | Performed by: PHYSICIAN ASSISTANT

## 2022-12-06 PROCEDURE — 96372 THER/PROPH/DIAG INJ SC/IM: CPT | Mod: PBBFAC,PO

## 2022-12-06 PROCEDURE — 99999 PR PBB SHADOW E&M-EST. PATIENT-LVL IV: CPT | Mod: PBBFAC,,, | Performed by: PHYSICIAN ASSISTANT

## 2022-12-06 PROCEDURE — 99214 OFFICE O/P EST MOD 30 MIN: CPT | Mod: PBBFAC,25,PO | Performed by: PHYSICIAN ASSISTANT

## 2022-12-06 PROCEDURE — 99213 OFFICE O/P EST LOW 20 MIN: CPT | Mod: S$PBB,,, | Performed by: PHYSICIAN ASSISTANT

## 2022-12-06 PROCEDURE — 99999 PR PBB SHADOW E&M-EST. PATIENT-LVL III: CPT | Mod: PBBFAC,,,

## 2022-12-06 RX ORDER — DOXYCYCLINE 100 MG/1
100 CAPSULE ORAL 2 TIMES DAILY
Qty: 14 CAPSULE | Refills: 0 | Status: SHIPPED | OUTPATIENT
Start: 2022-12-06 | End: 2022-12-13

## 2022-12-06 RX ORDER — METHYLPREDNISOLONE 4 MG/1
TABLET ORAL
Qty: 21 TABLET | Refills: 0 | Status: SHIPPED | OUTPATIENT
Start: 2022-12-06 | End: 2023-03-06

## 2022-12-06 RX ORDER — SULFAMETHOXAZOLE AND TRIMETHOPRIM 800; 160 MG/1; MG/1
1 TABLET ORAL 2 TIMES DAILY
Qty: 20 TABLET | Refills: 0 | Status: CANCELLED | OUTPATIENT
Start: 2022-12-06 | End: 2022-12-16

## 2022-12-06 RX ORDER — PROMETHAZINE HYDROCHLORIDE AND DEXTROMETHORPHAN HYDROBROMIDE 6.25; 15 MG/5ML; MG/5ML
5 SYRUP ORAL EVERY 6 HOURS PRN
Qty: 118 ML | Refills: 0 | Status: SHIPPED | OUTPATIENT
Start: 2022-12-06 | End: 2022-12-16

## 2022-12-06 RX ADMIN — TESTOSTERONE CYPIONATE 200 MG: 200 INJECTION, SOLUTION INTRAMUSCULAR at 08:12

## 2022-12-06 NOTE — TELEPHONE ENCOUNTER
----- Message from Marianna Ward sent at 12/6/2022  8:06 AM CST -----  Type:  Needs Medical Advice    Who Called: Patient  Would the patient rather a call back or a response via [facility]? Call  Best Call Back Number: [phone]    Additional Information: Will not be able to make it today. Pt would like to reschedule anytime before 12/15 or if not first available in January.

## 2022-12-06 NOTE — TELEPHONE ENCOUNTER
Spoke w/ pt. Pt advised that Maryam Stiles MD does not have any openings prior to pt leaving for a trip. Appt moved to Jan 17th at 1:15 pm.
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
## 2022-12-23 ENCOUNTER — PATIENT MESSAGE (OUTPATIENT)
Dept: FAMILY MEDICINE | Facility: CLINIC | Age: 72
End: 2022-12-23
Payer: MEDICARE

## 2022-12-31 ENCOUNTER — EXTERNAL CHRONIC CARE MANAGEMENT (OUTPATIENT)
Dept: PRIMARY CARE CLINIC | Facility: CLINIC | Age: 72
End: 2022-12-31
Payer: MEDICARE

## 2022-12-31 PROCEDURE — 99490 PR CHRONIC CARE MGMT, 1ST 20 MIN: ICD-10-PCS | Mod: S$PBB,,, | Performed by: FAMILY MEDICINE

## 2022-12-31 PROCEDURE — 99490 CHRNC CARE MGMT STAFF 1ST 20: CPT | Mod: PBBFAC,PO | Performed by: FAMILY MEDICINE

## 2022-12-31 PROCEDURE — 99490 CHRNC CARE MGMT STAFF 1ST 20: CPT | Mod: S$PBB,,, | Performed by: FAMILY MEDICINE

## 2023-01-03 ENCOUNTER — TELEPHONE (OUTPATIENT)
Dept: FAMILY MEDICINE | Facility: CLINIC | Age: 73
End: 2023-01-03
Payer: MEDICARE

## 2023-01-03 NOTE — TELEPHONE ENCOUNTER
----- Message from Penelope Armendariz sent at 1/3/2023  8:25 AM CST -----  Regarding: Pt called to cancel/reschedule his 8:45 am appt today due to a conflict with another doctor's appt and would like to be seen later today  Same Day Appointment Access Request:    Pt called to cancel/reschedule his 8:45 am appt today due to a conflict with another doctor's appt and would like to be seen later today    Pt can be reached at [phone]

## 2023-01-07 ENCOUNTER — PATIENT MESSAGE (OUTPATIENT)
Dept: FAMILY MEDICINE | Facility: CLINIC | Age: 73
End: 2023-01-07
Payer: MEDICARE

## 2023-01-12 ENCOUNTER — PATIENT MESSAGE (OUTPATIENT)
Dept: ADMINISTRATIVE | Facility: OTHER | Age: 73
End: 2023-01-12
Payer: MEDICARE

## 2023-01-16 ENCOUNTER — PATIENT MESSAGE (OUTPATIENT)
Dept: DERMATOLOGY | Facility: CLINIC | Age: 73
End: 2023-01-16
Payer: MEDICARE

## 2023-01-31 ENCOUNTER — EXTERNAL CHRONIC CARE MANAGEMENT (OUTPATIENT)
Dept: PRIMARY CARE CLINIC | Facility: CLINIC | Age: 73
End: 2023-01-31
Payer: MEDICARE

## 2023-01-31 PROCEDURE — 99490 CHRNC CARE MGMT STAFF 1ST 20: CPT | Mod: PBBFAC,25,PO | Performed by: FAMILY MEDICINE

## 2023-01-31 PROCEDURE — 99439 CHRNC CARE MGMT STAF EA ADDL: CPT | Mod: S$PBB,,, | Performed by: FAMILY MEDICINE

## 2023-01-31 PROCEDURE — 99439 PR CHRONIC CARE MGMT, EA ADDTL 20 MIN: ICD-10-PCS | Mod: S$PBB,,, | Performed by: FAMILY MEDICINE

## 2023-01-31 PROCEDURE — 99490 PR CHRONIC CARE MGMT, 1ST 20 MIN: ICD-10-PCS | Mod: S$PBB,,, | Performed by: FAMILY MEDICINE

## 2023-01-31 PROCEDURE — 99439 CHRNC CARE MGMT STAF EA ADDL: CPT | Mod: PBBFAC,25,27,PO | Performed by: FAMILY MEDICINE

## 2023-01-31 PROCEDURE — 99490 CHRNC CARE MGMT STAFF 1ST 20: CPT | Mod: S$PBB,,, | Performed by: FAMILY MEDICINE

## 2023-02-28 ENCOUNTER — EXTERNAL CHRONIC CARE MANAGEMENT (OUTPATIENT)
Dept: PRIMARY CARE CLINIC | Facility: CLINIC | Age: 73
End: 2023-02-28
Payer: MEDICARE

## 2023-02-28 PROCEDURE — 99490 CHRNC CARE MGMT STAFF 1ST 20: CPT | Mod: PBBFAC,PO | Performed by: FAMILY MEDICINE

## 2023-02-28 PROCEDURE — 99490 PR CHRONIC CARE MGMT, 1ST 20 MIN: ICD-10-PCS | Mod: S$PBB,,, | Performed by: FAMILY MEDICINE

## 2023-02-28 PROCEDURE — 99490 CHRNC CARE MGMT STAFF 1ST 20: CPT | Mod: S$PBB,,, | Performed by: FAMILY MEDICINE

## 2023-03-21 ENCOUNTER — TELEPHONE (OUTPATIENT)
Dept: ENDOSCOPY | Facility: HOSPITAL | Age: 73
End: 2023-03-21

## 2023-03-21 DIAGNOSIS — Z76.0 MEDICATION REFILL: ICD-10-CM

## 2023-03-21 RX ORDER — METOPROLOL TARTRATE 50 MG/1
50 TABLET ORAL 2 TIMES DAILY
Qty: 180 TABLET | Refills: 3 | Status: SHIPPED | OUTPATIENT
Start: 2023-03-21 | End: 2024-03-20

## 2023-03-21 NOTE — TELEPHONE ENCOUNTER
I have signed for the following orders AND/OR meds.  Please call the patient and ask the patient to schedule the testing AND/OR inform about any medications that were sent.      No orders of the defined types were placed in this encounter.      Medications Ordered This Encounter   Medications    metoprolol tartrate (LOPRESSOR) 50 MG tablet     Sig: Take 1 tablet (50 mg total) by mouth 2 (two) times daily.     Dispense:  180 tablet     Refill:  3

## 2023-03-31 ENCOUNTER — EXTERNAL CHRONIC CARE MANAGEMENT (OUTPATIENT)
Dept: PRIMARY CARE CLINIC | Facility: CLINIC | Age: 73
End: 2023-03-31
Payer: MEDICARE

## 2023-03-31 PROCEDURE — 99439 PR CHRONIC CARE MGMT, EA ADDTL 20 MIN: ICD-10-PCS | Mod: S$PBB,,, | Performed by: FAMILY MEDICINE

## 2023-03-31 PROCEDURE — 99439 CHRNC CARE MGMT STAF EA ADDL: CPT | Mod: PBBFAC,27,PO | Performed by: FAMILY MEDICINE

## 2023-03-31 PROCEDURE — 99490 CHRNC CARE MGMT STAFF 1ST 20: CPT | Mod: PBBFAC,PO | Performed by: FAMILY MEDICINE

## 2023-03-31 PROCEDURE — 99439 CHRNC CARE MGMT STAF EA ADDL: CPT | Mod: S$PBB,,, | Performed by: FAMILY MEDICINE

## 2023-03-31 PROCEDURE — 99490 CHRNC CARE MGMT STAFF 1ST 20: CPT | Mod: S$PBB,,, | Performed by: FAMILY MEDICINE

## 2023-03-31 PROCEDURE — 99490 PR CHRONIC CARE MGMT, 1ST 20 MIN: ICD-10-PCS | Mod: S$PBB,,, | Performed by: FAMILY MEDICINE

## 2023-04-20 ENCOUNTER — TELEPHONE (OUTPATIENT)
Dept: PULMONOLOGY | Facility: CLINIC | Age: 73
End: 2023-04-20
Payer: MEDICARE

## 2023-04-20 NOTE — TELEPHONE ENCOUNTER
----- Message from Matilde Mario sent at 4/20/2023  9:55 AM CDT -----  Regarding: Appt  Contact: [phone]  Patient Marvin is calling. Patient would like to speak with the office in ref to rescheduling his appts for tomorrow. Please call patient at [phone]    Thank You

## 2023-04-28 ENCOUNTER — TELEPHONE (OUTPATIENT)
Dept: FAMILY MEDICINE | Facility: CLINIC | Age: 73
End: 2023-04-28
Payer: MEDICARE

## 2023-04-28 NOTE — TELEPHONE ENCOUNTER
----- Message from Karen Flor sent at 4/28/2023  8:10 AM CDT -----  Regarding: please advise  Who Called:COMFORT PIERSON JR. [[id number]]         What is the reqeust in detail: Requesting call back to discuss if pt can talk to provider before [place] out [place]. He have a few short questions. Please advise         Can the clinic reply by [facility]?no         Best Call Back Number:[phone]           Additional Information:

## 2023-04-30 ENCOUNTER — EXTERNAL CHRONIC CARE MANAGEMENT (OUTPATIENT)
Dept: PRIMARY CARE CLINIC | Facility: CLINIC | Age: 73
End: 2023-04-30
Payer: MEDICARE

## 2023-04-30 PROCEDURE — 99490 PR CHRONIC CARE MGMT, 1ST 20 MIN: ICD-10-PCS | Mod: S$PBB,,, | Performed by: FAMILY MEDICINE

## 2023-04-30 PROCEDURE — 99439 CHRNC CARE MGMT STAF EA ADDL: CPT | Mod: S$PBB,,, | Performed by: FAMILY MEDICINE

## 2023-04-30 PROCEDURE — 99490 CHRNC CARE MGMT STAFF 1ST 20: CPT | Mod: S$PBB,,, | Performed by: FAMILY MEDICINE

## 2023-04-30 PROCEDURE — 99490 CHRNC CARE MGMT STAFF 1ST 20: CPT | Mod: PBBFAC,PO | Performed by: FAMILY MEDICINE

## 2023-04-30 PROCEDURE — 99439 PR CHRONIC CARE MGMT, EA ADDTL 20 MIN: ICD-10-PCS | Mod: S$PBB,,, | Performed by: FAMILY MEDICINE

## 2023-04-30 PROCEDURE — 99439 CHRNC CARE MGMT STAF EA ADDL: CPT | Mod: PBBFAC,27,PO | Performed by: FAMILY MEDICINE

## 2023-05-01 ENCOUNTER — PATIENT MESSAGE (OUTPATIENT)
Dept: FAMILY MEDICINE | Facility: CLINIC | Age: 73
End: 2023-05-01
Payer: MEDICARE

## 2023-08-20 ENCOUNTER — PATIENT MESSAGE (OUTPATIENT)
Dept: ADMINISTRATIVE | Facility: OTHER | Age: 73
End: 2023-08-20
Payer: MEDICARE

## 2023-09-05 ENCOUNTER — PATIENT MESSAGE (OUTPATIENT)
Dept: ADMINISTRATIVE | Facility: CLINIC | Age: 73
End: 2023-09-05
Payer: MEDICARE

## 2023-12-06 DIAGNOSIS — N52.9 ERECTILE DYSFUNCTION, UNSPECIFIED ERECTILE DYSFUNCTION TYPE: ICD-10-CM

## 2023-12-06 RX ORDER — SILDENAFIL 100 MG/1
100 TABLET, FILM COATED ORAL DAILY PRN
Qty: 30 TABLET | Refills: 0 | Status: SHIPPED | OUTPATIENT
Start: 2023-12-06

## 2023-12-06 NOTE — TELEPHONE ENCOUNTER
Provider Staff:  Action required for this patient    Requires appointment   Requires labs  [Due for annual with PCP, labs CMP, lipid panel outdated]     Please see care gap opportunities below in Care Due Message.    Thanks!  [facility]     Appointments      Date Provider   Last Visit   10/24/2022 Stef Brady MD   Next Visit   Visit date not found Stef Brady MD     Refill Decision Note   Marvin Chacon  is requesting a refill authorization.  Brief Assessment and Rationale for Refill:  Approve     Medication Therapy Plan:  Due for annual with PCP, labs CMP, lipid panel outdated      Comments:     Note composed:2:33 PM 12/06/2023

## 2023-12-06 NOTE — TELEPHONE ENCOUNTER
----- Message from Sarah Cornelius sent at 12/6/2023  3:51 PM CST -----  Contact: Marvin  Type:  Patient Returning Call    Who Called:Marvin   Who Left Message for Patient:Renetta  Does the patient know what this is regarding?:not sure   Would the patient rather a call back or a response via [facility]? Call back  Best Call Back Number:[phone]  Additional Information:   Thanks   Am

## 2023-12-06 NOTE — TELEPHONE ENCOUNTER
Care Due:                  Date            Visit Type   Department     Provider  --------------------------------------------------------------------------------                                MYCHART                              FOLLOWUP/OF  [facility]  Last Visit: 10-      FICE VISIT   MEDICINE       Stef Brady  Next Visit: None Scheduled  None         None Found                                                            Last  Test          Frequency    Reason                     Performed    Due Date  --------------------------------------------------------------------------------    Office Visit  15 months..  atorvastatin, irbesartan,   10-   01-                             metoprolol, sildenafiL,                             torsemide................    CMP.........  12 months..  atorvastatin, irbesartan,   10-   10-                             torsemide................    Lipid Panel.  12 months..  atorvastatin.............  10-   10-    [facility] Embedded Care Due Messages. Reference number: [id number].   12/06/2023 2:06:38 PM CST

## 2024-03-23 ENCOUNTER — PATIENT MESSAGE (OUTPATIENT)
Dept: FAMILY MEDICINE | Facility: CLINIC | Age: 74
End: 2024-03-23
Payer: MEDICARE

## 2024-03-28 ENCOUNTER — PATIENT MESSAGE (OUTPATIENT)
Dept: FAMILY MEDICINE | Facility: CLINIC | Age: 74
End: 2024-03-28
Payer: MEDICARE

## 2024-04-26 ENCOUNTER — PATIENT MESSAGE (OUTPATIENT)
Dept: FAMILY MEDICINE | Facility: CLINIC | Age: 74
End: 2024-04-26
Payer: MEDICARE

## 2024-05-13 ENCOUNTER — TELEPHONE (OUTPATIENT)
Dept: FAMILY MEDICINE | Facility: CLINIC | Age: 74
End: 2024-05-13
Payer: MEDICARE

## 2024-05-13 NOTE — TELEPHONE ENCOUNTER
----- Message from Carolina Cochran sent at 5/13/2024  3:51 PM CDT -----  Dr. Stef Brady,    Unfortunately, Mr. Marvin SOLOMON Chacon Jr. has failed to meet basic participation requirements for the [facility] Digital [facility] Program and will be discharged from the program. We have tried to contact him on multiple occasions without success. Unfortunately, he is not a good candidate for digital medicine monitoring.     Thank you for your support of Digital Medicine! Please contact me if you have any questions or concerns.    Sincerely,  Carolina Cochran

## 2024-06-28 ENCOUNTER — PATIENT MESSAGE (OUTPATIENT)
Dept: FAMILY MEDICINE | Facility: CLINIC | Age: 74
End: 2024-06-28
Payer: MEDICARE

## 2024-07-10 ENCOUNTER — PATIENT MESSAGE (OUTPATIENT)
Dept: FAMILY MEDICINE | Facility: CLINIC | Age: 74
End: 2024-07-10
Payer: MEDICARE